# Patient Record
Sex: MALE | Race: WHITE | NOT HISPANIC OR LATINO | Employment: OTHER | URBAN - METROPOLITAN AREA
[De-identification: names, ages, dates, MRNs, and addresses within clinical notes are randomized per-mention and may not be internally consistent; named-entity substitution may affect disease eponyms.]

---

## 2024-09-09 ENCOUNTER — APPOINTMENT (OUTPATIENT)
Dept: CT IMAGING | Facility: CLINIC | Age: 84
DRG: 853 | End: 2024-09-09
Attending: EMERGENCY MEDICINE
Payer: COMMERCIAL

## 2024-09-09 ENCOUNTER — HOSPITAL ENCOUNTER (INPATIENT)
Facility: CLINIC | Age: 84
LOS: 10 days | Discharge: NURSING, REHAB OR LONG-TERM CARE | DRG: 853 | End: 2024-09-20
Attending: EMERGENCY MEDICINE | Admitting: INTERNAL MEDICINE
Payer: COMMERCIAL

## 2024-09-09 ENCOUNTER — APPOINTMENT (OUTPATIENT)
Dept: GENERAL RADIOLOGY | Facility: CLINIC | Age: 84
DRG: 853 | End: 2024-09-09
Attending: EMERGENCY MEDICINE
Payer: COMMERCIAL

## 2024-09-09 ENCOUNTER — APPOINTMENT (OUTPATIENT)
Dept: ULTRASOUND IMAGING | Facility: CLINIC | Age: 84
DRG: 853 | End: 2024-09-09
Attending: EMERGENCY MEDICINE
Payer: COMMERCIAL
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
## 2024-09-09 DIAGNOSIS — Z79.01 WARFARIN ANTICOAGULATION: ICD-10-CM

## 2024-09-09 DIAGNOSIS — R50.9 FEVER, UNSPECIFIED FEVER CAUSE: ICD-10-CM

## 2024-09-09 DIAGNOSIS — K59.00 CONSTIPATION, UNSPECIFIED CONSTIPATION TYPE: ICD-10-CM

## 2024-09-09 DIAGNOSIS — R33.9 URINARY RETENTION: Primary | ICD-10-CM

## 2024-09-09 DIAGNOSIS — L97.512 ULCER OF RIGHT FOOT WITH FAT LAYER EXPOSED (H): ICD-10-CM

## 2024-09-09 DIAGNOSIS — A41.9 SEVERE SEPSIS (H): ICD-10-CM

## 2024-09-09 DIAGNOSIS — R65.20 SEVERE SEPSIS (H): ICD-10-CM

## 2024-09-09 DIAGNOSIS — L03.115 CELLULITIS OF RIGHT FOOT: ICD-10-CM

## 2024-09-09 DIAGNOSIS — S63.259A DISLOCATION OF FINGER, INITIAL ENCOUNTER: ICD-10-CM

## 2024-09-09 DIAGNOSIS — S09.90XA CLOSED HEAD INJURY, INITIAL ENCOUNTER: ICD-10-CM

## 2024-09-09 DIAGNOSIS — R79.89 ELEVATED LACTIC ACID LEVEL: ICD-10-CM

## 2024-09-09 LAB
ACANTHOCYTES BLD QL SMEAR: SLIGHT
ALBUMIN SERPL BCG-MCNC: 3.9 G/DL (ref 3.5–5.2)
ALP SERPL-CCNC: 116 U/L (ref 40–150)
ALT SERPL W P-5'-P-CCNC: 26 U/L (ref 0–70)
ANION GAP SERPL CALCULATED.3IONS-SCNC: 8 MMOL/L (ref 7–15)
AST SERPL W P-5'-P-CCNC: 25 U/L (ref 0–45)
BASOPHILS # BLD MANUAL: 0 10E3/UL (ref 0–0.2)
BASOPHILS NFR BLD MANUAL: 0 %
BILIRUB SERPL-MCNC: 0.6 MG/DL
BUN SERPL-MCNC: 37.9 MG/DL (ref 8–23)
BURR CELLS BLD QL SMEAR: SLIGHT
CALCIUM SERPL-MCNC: 10.6 MG/DL (ref 8.8–10.4)
CHLORIDE SERPL-SCNC: 108 MMOL/L (ref 98–107)
CREAT SERPL-MCNC: 1.66 MG/DL (ref 0.67–1.17)
EGFRCR SERPLBLD CKD-EPI 2021: 41 ML/MIN/1.73M2
ELLIPTOCYTES BLD QL SMEAR: SLIGHT
EOSINOPHIL # BLD MANUAL: 0 10E3/UL (ref 0–0.7)
EOSINOPHIL NFR BLD MANUAL: 0 %
ERYTHROCYTE [DISTWIDTH] IN BLOOD BY AUTOMATED COUNT: 13.8 % (ref 10–15)
GLUCOSE SERPL-MCNC: 114 MG/DL (ref 70–99)
HCO3 SERPL-SCNC: 25 MMOL/L (ref 22–29)
HCT VFR BLD AUTO: 41.2 % (ref 40–53)
HGB BLD-MCNC: 13.2 G/DL (ref 13.3–17.7)
LACTATE SERPL-SCNC: 2.9 MMOL/L (ref 0.7–2)
LYMPHOCYTES # BLD MANUAL: 2.6 10E3/UL (ref 0.8–5.3)
LYMPHOCYTES NFR BLD MANUAL: 11 %
MCH RBC QN AUTO: 29.3 PG (ref 26.5–33)
MCHC RBC AUTO-ENTMCNC: 32 G/DL (ref 31.5–36.5)
MCV RBC AUTO: 91 FL (ref 78–100)
MONOCYTES # BLD MANUAL: 0.5 10E3/UL (ref 0–1.3)
MONOCYTES NFR BLD MANUAL: 2 %
NEUTROPHILS # BLD MANUAL: 20.9 10E3/UL (ref 1.6–8.3)
NEUTROPHILS NFR BLD MANUAL: 87 %
NRBC # BLD AUTO: 0 10E3/UL
NRBC BLD AUTO-RTO: 0 /100
PLAT MORPH BLD: ABNORMAL
PLATELET # BLD AUTO: 232 10E3/UL (ref 150–450)
POTASSIUM SERPL-SCNC: 6.1 MMOL/L (ref 3.4–5.3)
PROT SERPL-MCNC: 6.7 G/DL (ref 6.4–8.3)
RBC # BLD AUTO: 4.51 10E6/UL (ref 4.4–5.9)
RBC MORPH BLD: ABNORMAL
SODIUM SERPL-SCNC: 141 MMOL/L (ref 135–145)
WBC # BLD AUTO: 24 10E3/UL (ref 4–11)

## 2024-09-09 PROCEDURE — 250N000013 HC RX MED GY IP 250 OP 250 PS 637: Performed by: EMERGENCY MEDICINE

## 2024-09-09 PROCEDURE — 99285 EMERGENCY DEPT VISIT HI MDM: CPT | Mod: 25

## 2024-09-09 PROCEDURE — 250N000011 HC RX IP 250 OP 636: Performed by: EMERGENCY MEDICINE

## 2024-09-09 PROCEDURE — 80053 COMPREHEN METABOLIC PANEL: CPT | Performed by: EMERGENCY MEDICINE

## 2024-09-09 PROCEDURE — 36415 COLL VENOUS BLD VENIPUNCTURE: CPT | Performed by: EMERGENCY MEDICINE

## 2024-09-09 PROCEDURE — 93971 EXTREMITY STUDY: CPT | Mod: RT

## 2024-09-09 PROCEDURE — 26770 TREAT FINGER DISLOCATION: CPT | Mod: F2

## 2024-09-09 PROCEDURE — 96367 TX/PROPH/DG ADDL SEQ IV INF: CPT

## 2024-09-09 PROCEDURE — 87040 BLOOD CULTURE FOR BACTERIA: CPT | Performed by: EMERGENCY MEDICINE

## 2024-09-09 PROCEDURE — 83605 ASSAY OF LACTIC ACID: CPT | Performed by: EMERGENCY MEDICINE

## 2024-09-09 PROCEDURE — 96365 THER/PROPH/DIAG IV INF INIT: CPT

## 2024-09-09 PROCEDURE — 258N000003 HC RX IP 258 OP 636: Performed by: EMERGENCY MEDICINE

## 2024-09-09 PROCEDURE — 999N000065 XR FINGER LEFT G/E 2 VIEWS: Mod: LT

## 2024-09-09 PROCEDURE — 70450 CT HEAD/BRAIN W/O DYE: CPT

## 2024-09-09 PROCEDURE — 96366 THER/PROPH/DIAG IV INF ADDON: CPT

## 2024-09-09 PROCEDURE — 85610 PROTHROMBIN TIME: CPT | Performed by: EMERGENCY MEDICINE

## 2024-09-09 PROCEDURE — 73140 X-RAY EXAM OF FINGER(S): CPT | Mod: LT

## 2024-09-09 PROCEDURE — 85007 BL SMEAR W/DIFF WBC COUNT: CPT | Performed by: EMERGENCY MEDICINE

## 2024-09-09 PROCEDURE — 87149 DNA/RNA DIRECT PROBE: CPT | Performed by: EMERGENCY MEDICINE

## 2024-09-09 PROCEDURE — 85048 AUTOMATED LEUKOCYTE COUNT: CPT | Performed by: EMERGENCY MEDICINE

## 2024-09-09 PROCEDURE — 84132 ASSAY OF SERUM POTASSIUM: CPT | Performed by: EMERGENCY MEDICINE

## 2024-09-09 PROCEDURE — 0RSXX5Z REPOSITION LEFT FINGER PHALANGEAL JOINT WITH EXTERNAL FIXATION DEVICE, EXTERNAL APPROACH: ICD-10-PCS | Performed by: EMERGENCY MEDICINE

## 2024-09-09 PROCEDURE — 85027 COMPLETE CBC AUTOMATED: CPT | Performed by: EMERGENCY MEDICINE

## 2024-09-09 PROCEDURE — 86140 C-REACTIVE PROTEIN: CPT | Performed by: INTERNAL MEDICINE

## 2024-09-09 PROCEDURE — 83036 HEMOGLOBIN GLYCOSYLATED A1C: CPT | Performed by: INTERNAL MEDICINE

## 2024-09-09 RX ORDER — FAMOTIDINE 20 MG/1
20 TABLET, FILM COATED ORAL 2 TIMES DAILY
COMMUNITY

## 2024-09-09 RX ORDER — METOPROLOL SUCCINATE 25 MG/1
25 TABLET, EXTENDED RELEASE ORAL DAILY
Status: ON HOLD | COMMUNITY
End: 2024-09-20

## 2024-09-09 RX ORDER — PIPERACILLIN SODIUM, TAZOBACTAM SODIUM 4; .5 G/20ML; G/20ML
4.5 INJECTION, POWDER, LYOPHILIZED, FOR SOLUTION INTRAVENOUS ONCE
Status: COMPLETED | OUTPATIENT
Start: 2024-09-09 | End: 2024-09-10

## 2024-09-09 RX ORDER — ROSUVASTATIN CALCIUM 10 MG/1
10 TABLET, COATED ORAL DAILY
COMMUNITY

## 2024-09-09 RX ORDER — ISOSORBIDE MONONITRATE 20 MG/1
20 TABLET ORAL 2 TIMES DAILY
COMMUNITY
End: 2024-09-10

## 2024-09-09 RX ORDER — WARFARIN SODIUM 5 MG/1
5 TABLET ORAL DAILY
Status: ON HOLD | COMMUNITY
End: 2024-09-20

## 2024-09-09 RX ORDER — ALENDRONATE SODIUM 70 MG/1
70 TABLET ORAL
COMMUNITY

## 2024-09-09 RX ORDER — ACETAMINOPHEN 500 MG
1000 TABLET ORAL ONCE
Status: DISCONTINUED | OUTPATIENT
Start: 2024-09-09 | End: 2024-09-09

## 2024-09-09 RX ADMIN — PIPERACILLIN AND TAZOBACTAM 4.5 G: 4; .5 INJECTION, POWDER, FOR SOLUTION INTRAVENOUS at 23:51

## 2024-09-09 RX ADMIN — ACETAMINOPHEN 1000 MG: 500 TABLET, FILM COATED ORAL at 23:54

## 2024-09-09 RX ADMIN — SODIUM CHLORIDE 1000 ML: 9 INJECTION, SOLUTION INTRAVENOUS at 23:29

## 2024-09-09 ASSESSMENT — ACTIVITIES OF DAILY LIVING (ADL): ADLS_ACUITY_SCORE: 35

## 2024-09-09 NOTE — LETTER
[facility] ORTHO SPINE  [address]  [facility] [id number]  [phone]    FACSIMILE TRANSMITTAL SHEET    TO: JO ORTEGA AUTH  FAX NUMBER: [phone]      FROM: [facility]  PHONE: [phone]  DATE: 09/13/24      NOTES/COMMENTS: REFERENCE [id number]                                      IF YOU DID NOT RECEIVE THE CORRECT NUMBER OF PAGES OR THE FAX DID NOT COME THROUGH CLEARLY, PLEASE CALL THE SENDER     CONFIDENTIALITY STATEMENT: Confidential information that may accompany this transmission contains protected health information under state and federal law and is legally privileged. This information is intended only for the use of the individual or entity named above and may be used only for carrying out treatment, payment or other healthcare operations. The recipient or person responsible for delivering this information is prohibited by law from disclosing this information without proper authorization to any other party, unless required to do so by law or regulation. If you are not the intended recipient, you are hereby notified that any review, dissemination, distribution, or copying of this message is strictly prohibited. If you have received this communication in error, please destroy the materials and contact us immediately by calling the number listed above. No response indicates that the information was received by the appropriate authorized party

## 2024-09-10 ENCOUNTER — APPOINTMENT (OUTPATIENT)
Dept: MRI IMAGING | Facility: CLINIC | Age: 84
DRG: 853 | End: 2024-09-10
Attending: INTERNAL MEDICINE
Payer: COMMERCIAL

## 2024-09-10 ENCOUNTER — APPOINTMENT (OUTPATIENT)
Dept: ULTRASOUND IMAGING | Facility: CLINIC | Age: 84
DRG: 853 | End: 2024-09-10
Attending: PODIATRIST
Payer: COMMERCIAL

## 2024-09-10 PROBLEM — S09.90XA CLOSED HEAD INJURY, INITIAL ENCOUNTER: Status: ACTIVE | Noted: 2024-09-10

## 2024-09-10 PROBLEM — S63.259A DISLOCATION OF FINGER, INITIAL ENCOUNTER: Status: ACTIVE | Noted: 2024-09-10

## 2024-09-10 PROBLEM — L97.512 ULCER OF RIGHT FOOT WITH FAT LAYER EXPOSED (H): Status: ACTIVE | Noted: 2024-09-10

## 2024-09-10 PROBLEM — A41.9 SEVERE SEPSIS (H): Status: ACTIVE | Noted: 2024-09-10

## 2024-09-10 PROBLEM — R79.89 ELEVATED LACTIC ACID LEVEL: Status: ACTIVE | Noted: 2024-09-10

## 2024-09-10 PROBLEM — R50.9 FEVER, UNSPECIFIED FEVER CAUSE: Status: ACTIVE | Noted: 2024-09-10

## 2024-09-10 PROBLEM — Z79.01 WARFARIN ANTICOAGULATION: Status: ACTIVE | Noted: 2024-09-10

## 2024-09-10 PROBLEM — L03.115 CELLULITIS OF RIGHT FOOT: Status: ACTIVE | Noted: 2024-09-10
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
## 2024-09-10 PROBLEM — R65.20 SEVERE SEPSIS (H): Status: ACTIVE | Noted: 2024-09-10

## 2024-09-10 LAB
ALBUMIN UR-MCNC: 50 MG/DL
ANION GAP SERPL CALCULATED.3IONS-SCNC: 7 MMOL/L (ref 7–15)
APPEARANCE UR: CLEAR
BILIRUB UR QL STRIP: NEGATIVE
BUN SERPL-MCNC: 32 MG/DL (ref 8–23)
CALCIUM SERPL-MCNC: 9.7 MG/DL (ref 8.8–10.4)
CHLORIDE SERPL-SCNC: 109 MMOL/L (ref 98–107)
COLOR UR AUTO: YELLOW
CREAT SERPL-MCNC: 1.45 MG/DL (ref 0.67–1.17)
CRP SERPL-MCNC: 72.26 MG/L
EGFRCR SERPLBLD CKD-EPI 2021: 48 ML/MIN/1.73M2
ERYTHROCYTE [DISTWIDTH] IN BLOOD BY AUTOMATED COUNT: 13.8 % (ref 10–15)
ERYTHROCYTE [SEDIMENTATION RATE] IN BLOOD BY WESTERGREN METHOD: 14 MM/HR (ref 0–20)
GLUCOSE SERPL-MCNC: 117 MG/DL (ref 70–99)
GLUCOSE UR STRIP-MCNC: NEGATIVE MG/DL
HBA1C MFR BLD: 5.2 %
HCO3 SERPL-SCNC: 22 MMOL/L (ref 22–29)
HCT VFR BLD AUTO: 36.3 % (ref 40–53)
HGB BLD-MCNC: 11.8 G/DL (ref 13.3–17.7)
HGB UR QL STRIP: ABNORMAL
HYALINE CASTS: 2 /LPF
INR PPP: 3.7 (ref 0.85–1.15)
INR PPP: 3.98 (ref 0.85–1.15)
KETONES UR STRIP-MCNC: NEGATIVE MG/DL
LACTATE SERPL-SCNC: 1.2 MMOL/L (ref 0.7–2)
LACTATE SERPL-SCNC: 2.3 MMOL/L (ref 0.7–2)
LEUKOCYTE ESTERASE UR QL STRIP: NEGATIVE
MCH RBC QN AUTO: 29.5 PG (ref 26.5–33)
MCHC RBC AUTO-ENTMCNC: 32.5 G/DL (ref 31.5–36.5)
MCV RBC AUTO: 91 FL (ref 78–100)
MRSA DNA SPEC QL NAA+PROBE: NEGATIVE
MUCOUS THREADS #/AREA URNS LPF: PRESENT /LPF
NITRATE UR QL: NEGATIVE
PH UR STRIP: 6.5 [PH] (ref 5–7)
PLATELET # BLD AUTO: 161 10E3/UL (ref 150–450)
POTASSIUM SERPL-SCNC: 4.8 MMOL/L (ref 3.4–5.3)
POTASSIUM SERPL-SCNC: 5.2 MMOL/L (ref 3.4–5.3)
RBC # BLD AUTO: 4 10E6/UL (ref 4.4–5.9)
RBC URINE: 25 /HPF
SA TARGET DNA: POSITIVE
SODIUM SERPL-SCNC: 138 MMOL/L (ref 135–145)
SP GR UR STRIP: 1.02 (ref 1–1.03)
SQUAMOUS EPITHELIAL: <1 /HPF
UROBILINOGEN UR STRIP-MCNC: 2 MG/DL
WBC # BLD AUTO: 20.2 10E3/UL (ref 4–11)
WBC URINE: 2 /HPF

## 2024-09-10 PROCEDURE — 73718 MRI LOWER EXTREMITY W/O DYE: CPT | Mod: 26 | Performed by: RADIOLOGY

## 2024-09-10 PROCEDURE — 87641 MR-STAPH DNA AMP PROBE: CPT | Performed by: INTERNAL MEDICINE

## 2024-09-10 PROCEDURE — 250N000013 HC RX MED GY IP 250 OP 250 PS 637: Performed by: INTERNAL MEDICINE

## 2024-09-10 PROCEDURE — 120N000001 HC R&B MED SURG/OB

## 2024-09-10 PROCEDURE — 250N000011 HC RX IP 250 OP 636: Performed by: INTERNAL MEDICINE

## 2024-09-10 PROCEDURE — 81001 URINALYSIS AUTO W/SCOPE: CPT | Performed by: INTERNAL MEDICINE

## 2024-09-10 PROCEDURE — 73718 MRI LOWER EXTREMITY W/O DYE: CPT | Mod: RT

## 2024-09-10 PROCEDURE — 82374 ASSAY BLOOD CARBON DIOXIDE: CPT | Performed by: INTERNAL MEDICINE

## 2024-09-10 PROCEDURE — 258N000003 HC RX IP 258 OP 636: Performed by: EMERGENCY MEDICINE

## 2024-09-10 PROCEDURE — 36415 COLL VENOUS BLD VENIPUNCTURE: CPT | Performed by: INTERNAL MEDICINE

## 2024-09-10 PROCEDURE — 99207 PR APP CREDIT; MD BILLING SHARED VISIT: CPT | Performed by: INTERNAL MEDICINE

## 2024-09-10 PROCEDURE — 85652 RBC SED RATE AUTOMATED: CPT | Performed by: INTERNAL MEDICINE

## 2024-09-10 PROCEDURE — 99223 1ST HOSP IP/OBS HIGH 75: CPT | Performed by: INTERNAL MEDICINE

## 2024-09-10 PROCEDURE — 93922 UPR/L XTREMITY ART 2 LEVELS: CPT

## 2024-09-10 PROCEDURE — 87640 STAPH A DNA AMP PROBE: CPT | Performed by: INTERNAL MEDICINE

## 2024-09-10 PROCEDURE — 258N000003 HC RX IP 258 OP 636: Performed by: INTERNAL MEDICINE

## 2024-09-10 PROCEDURE — 99222 1ST HOSP IP/OBS MODERATE 55: CPT | Performed by: PODIATRIST

## 2024-09-10 PROCEDURE — 85027 COMPLETE CBC AUTOMATED: CPT | Performed by: INTERNAL MEDICINE

## 2024-09-10 PROCEDURE — 85610 PROTHROMBIN TIME: CPT | Performed by: INTERNAL MEDICINE

## 2024-09-10 PROCEDURE — 83605 ASSAY OF LACTIC ACID: CPT | Performed by: INTERNAL MEDICINE

## 2024-09-10 RX ORDER — ISOSORBIDE MONONITRATE 60 MG/1
60 TABLET, EXTENDED RELEASE ORAL DAILY
COMMUNITY

## 2024-09-10 RX ORDER — PIPERACILLIN SODIUM, TAZOBACTAM SODIUM 3; .375 G/15ML; G/15ML
3.38 INJECTION, POWDER, LYOPHILIZED, FOR SOLUTION INTRAVENOUS EVERY 6 HOURS
Status: DISCONTINUED | OUTPATIENT
Start: 2024-09-10 | End: 2024-09-17

## 2024-09-10 RX ORDER — AMLODIPINE BESYLATE 2.5 MG/1
2.5 TABLET ORAL DAILY
Status: DISCONTINUED | OUTPATIENT
Start: 2024-09-10 | End: 2024-09-12

## 2024-09-10 RX ORDER — AMOXICILLIN 250 MG
2 CAPSULE ORAL 2 TIMES DAILY PRN
Status: DISCONTINUED | OUTPATIENT
Start: 2024-09-10 | End: 2024-09-12

## 2024-09-10 RX ORDER — CARBOXYMETHYLCELLULOSE SODIUM 5 MG/ML
1-2 SOLUTION/ DROPS OPHTHALMIC 4 TIMES DAILY PRN
Status: DISCONTINUED | OUTPATIENT
Start: 2024-09-10 | End: 2024-09-20 | Stop reason: HOSPADM

## 2024-09-10 RX ORDER — LIDOCAINE 40 MG/G
CREAM TOPICAL
Status: DISCONTINUED | OUTPATIENT
Start: 2024-09-10 | End: 2024-09-20

## 2024-09-10 RX ORDER — ROSUVASTATIN CALCIUM 10 MG/1
10 TABLET, COATED ORAL DAILY
Status: DISCONTINUED | OUTPATIENT
Start: 2024-09-10 | End: 2024-09-20 | Stop reason: HOSPADM

## 2024-09-10 RX ORDER — METOPROLOL SUCCINATE 25 MG/1
25 TABLET, EXTENDED RELEASE ORAL DAILY
Status: DISCONTINUED | OUTPATIENT
Start: 2024-09-10 | End: 2024-09-14

## 2024-09-10 RX ORDER — AMOXICILLIN 250 MG
1 CAPSULE ORAL 2 TIMES DAILY PRN
Status: DISCONTINUED | OUTPATIENT
Start: 2024-09-10 | End: 2024-09-12

## 2024-09-10 RX ORDER — CHOLECALCIFEROL (VITAMIN D3) 50 MCG
1 TABLET ORAL DAILY
COMMUNITY

## 2024-09-10 RX ORDER — LORAZEPAM 0.5 MG/1
0.5 TABLET ORAL
Status: DISCONTINUED | OUTPATIENT
Start: 2024-09-10 | End: 2024-09-11

## 2024-09-10 RX ORDER — ONDANSETRON 2 MG/ML
4 INJECTION INTRAMUSCULAR; INTRAVENOUS EVERY 6 HOURS PRN
Status: DISCONTINUED | OUTPATIENT
Start: 2024-09-10 | End: 2024-09-20 | Stop reason: HOSPADM

## 2024-09-10 RX ORDER — ACETAMINOPHEN 650 MG/1
650 SUPPOSITORY RECTAL EVERY 4 HOURS PRN
Status: DISCONTINUED | OUTPATIENT
Start: 2024-09-10 | End: 2024-09-12

## 2024-09-10 RX ORDER — ONDANSETRON 4 MG/1
4 TABLET, ORALLY DISINTEGRATING ORAL EVERY 6 HOURS PRN
Status: DISCONTINUED | OUTPATIENT
Start: 2024-09-10 | End: 2024-09-20 | Stop reason: HOSPADM

## 2024-09-10 RX ORDER — FAMOTIDINE 20 MG/1
20 TABLET, FILM COATED ORAL 2 TIMES DAILY
Status: DISCONTINUED | OUTPATIENT
Start: 2024-09-10 | End: 2024-09-12

## 2024-09-10 RX ORDER — ISOSORBIDE MONONITRATE 30 MG/1
60 TABLET, EXTENDED RELEASE ORAL DAILY
Status: DISCONTINUED | OUTPATIENT
Start: 2024-09-10 | End: 2024-09-12

## 2024-09-10 RX ORDER — AMLODIPINE BESYLATE 10 MG/1
10 TABLET ORAL DAILY
COMMUNITY

## 2024-09-10 RX ORDER — POLYETHYLENE GLYCOL 3350 17 G/17G
17 POWDER, FOR SOLUTION ORAL 2 TIMES DAILY PRN
Status: DISCONTINUED | OUTPATIENT
Start: 2024-09-10 | End: 2024-09-12

## 2024-09-10 RX ORDER — VANCOMYCIN HYDROCHLORIDE
1250 EVERY 24 HOURS
Status: DISCONTINUED | OUTPATIENT
Start: 2024-09-10 | End: 2024-09-11

## 2024-09-10 RX ORDER — ACETAMINOPHEN 325 MG/1
650 TABLET ORAL EVERY 4 HOURS PRN
Status: DISCONTINUED | OUTPATIENT
Start: 2024-09-10 | End: 2024-09-12

## 2024-09-10 RX ORDER — VIT C/E/ZN/COPPR/LUTEIN/ZEAXAN 250MG-90MG
1 CAPSULE ORAL 2 TIMES DAILY
COMMUNITY

## 2024-09-10 RX ORDER — DIPHENHYDRAMINE HCL 25 MG
1-2 CAPSULE ORAL 4 TIMES DAILY PRN
COMMUNITY

## 2024-09-10 RX ORDER — CALCIUM CARBONATE 500 MG/1
1000 TABLET, CHEWABLE ORAL 4 TIMES DAILY PRN
Status: DISCONTINUED | OUTPATIENT
Start: 2024-09-10 | End: 2024-09-20 | Stop reason: HOSPADM

## 2024-09-10 RX ORDER — VANCOMYCIN 2 GRAM/500 ML IN 0.9 % SODIUM CHLORIDE INTRAVENOUS
2000 ONCE
Status: COMPLETED | OUTPATIENT
Start: 2024-09-10 | End: 2024-09-10

## 2024-09-10 RX ADMIN — ROSUVASTATIN 10 MG: 10 TABLET, FILM COATED ORAL at 13:27

## 2024-09-10 RX ADMIN — AMLODIPINE BESYLATE 2.5 MG: 2.5 TABLET ORAL at 18:10

## 2024-09-10 RX ADMIN — METOPROLOL SUCCINATE 25 MG: 25 TABLET, EXTENDED RELEASE ORAL at 13:27

## 2024-09-10 RX ADMIN — VANCOMYCIN HYDROCHLORIDE 1250 MG: 5 INJECTION, POWDER, LYOPHILIZED, FOR SOLUTION INTRAVENOUS at 22:54

## 2024-09-10 RX ADMIN — VANCOMYCIN HYDROCHLORIDE 2000 MG: 10 INJECTION, POWDER, LYOPHILIZED, FOR SOLUTION INTRAVENOUS at 02:41

## 2024-09-10 RX ADMIN — FAMOTIDINE 20 MG: 20 TABLET ORAL at 20:41

## 2024-09-10 RX ADMIN — PIPERACILLIN AND TAZOBACTAM 3.38 G: 3; .375 INJECTION, POWDER, FOR SOLUTION INTRAVENOUS at 20:41

## 2024-09-10 RX ADMIN — SODIUM CHLORIDE, POTASSIUM CHLORIDE, SODIUM LACTATE AND CALCIUM CHLORIDE 2000 ML: 600; 310; 30; 20 INJECTION, SOLUTION INTRAVENOUS at 00:56

## 2024-09-10 RX ADMIN — ISOSORBIDE MONONITRATE 60 MG: 60 TABLET, EXTENDED RELEASE ORAL at 15:13

## 2024-09-10 RX ADMIN — PIPERACILLIN AND TAZOBACTAM 3.38 G: 3; .375 INJECTION, POWDER, FOR SOLUTION INTRAVENOUS at 09:52

## 2024-09-10 RX ADMIN — PIPERACILLIN AND TAZOBACTAM 3.38 G: 3; .375 INJECTION, POWDER, FOR SOLUTION INTRAVENOUS at 15:14

## 2024-09-10 ASSESSMENT — ACTIVITIES OF DAILY LIVING (ADL)
ADLS_ACUITY_SCORE: 38
ADLS_ACUITY_SCORE: 35
ADLS_ACUITY_SCORE: 37
ADLS_ACUITY_SCORE: 35
ADLS_ACUITY_SCORE: 37
ADLS_ACUITY_SCORE: 30
ADLS_ACUITY_SCORE: 35
ADLS_ACUITY_SCORE: 35
ADLS_ACUITY_SCORE: 38
ADLS_ACUITY_SCORE: 38
ADLS_ACUITY_SCORE: 35
ADLS_ACUITY_SCORE: 41
ADLS_ACUITY_SCORE: 38
ADLS_ACUITY_SCORE: 35
ADLS_ACUITY_SCORE: 35
ADLS_ACUITY_SCORE: 38
ADLS_ACUITY_SCORE: 35
ADLS_ACUITY_SCORE: 38
ADLS_ACUITY_SCORE: 35
ADLS_ACUITY_SCORE: 38
ADLS_ACUITY_SCORE: 35
ADLS_ACUITY_SCORE: 38
ADLS_ACUITY_SCORE: 35

## 2024-09-10 NOTE — PHARMACY-ADMISSION MEDICATION HISTORY
Pharmacist Admission Medication History    Admission medication history is complete. The information provided in this note is only as accurate as the sources available at the time of the update.    Information Source(s): Patient, Family member, Clinic records, and CareEverywhere/SureScripts via in-person    Pertinent Information:  On 9/9, INR 4.3 Warfarin held & 5mg daily dose to start 9/10.    Changes made to PTA medication list:  Added: Amlodipine, Imdur, Vit.D, eye vits, artificial tears  Deleted: Isosorbide Mono 20mg bid  Changed: Veltassa 8.4g daily-->MWF    Allergies reviewed with patient and updates made in EHR: yes    Medication History Completed By: Rekha Monge PharmD 9/10/2024 11:49 AM    PTA Med List   Medication Sig Last Dose    alendronate (FOSAMAX) 70 MG tablet Take 70 mg by mouth every 7 days. On Saturdays 9/7/2024    amLODIPine (NORVASC) 10 MG tablet Take 10 mg by mouth daily. 9/9/2024    famotidine (PEPCID) 20 MG tablet Take 20 mg by mouth 2 times daily. 9/9/2024 at am    hypromellose-dextran (HYPROMELLOSE-DEXTRAN 0.3-0.1%) 0.1-0.3 % ophthalmic solution Place 1-2 drops into both eyes 4 times daily as needed for dry eyes. prn    isosorbide mononitrate (IMDUR) 60 MG 24 hr tablet Take 60 mg by mouth daily. 9/9/2024    metoprolol succinate ER (TOPROL XL) 25 MG 24 hr tablet Take 25 mg by mouth daily. 9/9/2024    Multiple Vitamins-Minerals (PRESERVISION AREDS 2) CHEW Take 1 tablet by mouth 2 times daily. 9/9/2024    patiromer (VELTASSA) 8.4 g packet Take 8.4 g by mouth. on Mon Wed Fri at 1500 9/9/2024    rosuvastatin (CRESTOR) 10 MG tablet Take 10 mg by mouth daily. 9/9/2024    vitamin D3 (CHOLECALCIFEROL) 50 mcg (2000 units) tablet Take 1 tablet by mouth daily. 9/9/2024    warfarin ANTICOAGULANT (COUMADIN) 5 MG tablet Take 5 mg by mouth daily. 7.5mg on 9/8/2024 at pm

## 2024-09-10 NOTE — ED NOTES
[facility]    ED Boarding Nurse Handoff Addendum Report:    Date/time: 9/10/2024, 11:38 AM    Activity Level: assist of 1    Fall Risk: Yes:  nonskid shoes/slippers when out of bed, arm band in place, patient and family education, and assistive device/personal items within reach    Active Infusions: PIV SL    Current Meds Due: see MAR    Current care needs: Podiatry consult, orthopedic surgery consult.     Oxygen requirements (liters/min and/or FiO2): none    Respiratory status: Room air    Vital signs (within last 30 minutes):    Vitals:    09/10/24 0530 09/10/24 0600 09/10/24 0630 09/10/24 0745   BP: 129/67 113/69 123/66 123/69   BP Location:    Left arm   Pulse: 58 62 55 50   Resp:    18   Temp:    97.4  F (36.3  C)   TempSrc:    Oral   SpO2:    91%   Weight:       Height:           Focused assessment within last 30 minutes:    Patient A&O x3, forgetful. Foam/metal splint intact to left third finger, splint was removed briefly in order for patient to go to MRI. Bruising noted to finger, mild swelling, denies pain to finger. Right lower leg, warm, red/salazar, wound to bottom of foot, MRI completed. Patient tolerating IV antibiotics. NPO. Patient had been due to void, unable to void. Bladder scanned for 655cc, straight cath completed with 600cc out. Denies difficulty with urination but does states he sees urologist regularily with cystoscopies frequently due to hx of cancer.     ED Boarding Nurse name: Fe Kumari RN

## 2024-09-10 NOTE — ED TRIAGE NOTES
Arrives from home. States he was taking the trash out and fell onto his face. States injured his left hand middle finger appears dislocated.     Also reports hitting his right ankle. Noted that right ankle is VERY swollen, hot to the touch and appears red.     Denies hitting his head.

## 2024-09-10 NOTE — H&P
[facility]  Hospitalist Admission Note  Name: Gregory Zhong    MRN: [id number]  YOB: 1940    Age: 83 year old  Date of admission: 9/9/2024  Primary care provider: Fannie Vail [place]    Chief Complaint:  fall    Assessment and Plan:   Fall  Left 3rd finger dislocation: Patient was bringing out the yard waste been to the curb and it was quite heavy.  He says he slipped on the driveway.  Denies any head trauma, but he has an abrasion to his nose and chin.  He does not think he lost consciousness.  He had a deformity of the left third middle finger and x-ray showed dislocation which was reduced in the ER and splint applied.  He is on warfarin with supratherapeutic INR, but noncontrast head CT was negative for acute trauma/bleeding.  -Consult orthopedic surgery for the left third finger dislocation  -Acetaminophen as needed for mild pain  -Fall precautions  -Consult PT/SW    Sepsis  Right lower extremity cellulitis  Right plantar foot ulcer, possible osteomyelitis  Neuropathy: In the ER he was slightly tachycardic with a temperature of 100.6  F and had a leukocytosis of 24.  His lactic acid was elevated at 2.9 consistent with sepsis.  Initial source unclear until clothing removed and he was found to have a deep right plantar foot ulcer along with 2-3+ right lower extremity edema with erythema consistent with cellulitis and concern for possible osteomyelitis.  He and his son have no idea how long the ulcer has been there although his son noted for at least the past month he said some difficulty ambulating with that right leg.  He does have a history of idiopathic neuropathy and really minimal light touch sensation in the right foot on exam.  He is not a known diabetic.  His CRP is elevated at 72.  Right lower extremity ultrasound negative for DVT.  He received IV vancomycin/IV Zosyn, 2 L LR, and 1 L NS in the ER.  Lactic acid improved to 2.3.  -Empiric IV vancomycin pharmacy to  dose and IV Zosyn 3.375 g every 6 hours  -Consult podiatry  -Ordered a right foot MRI.  Also ordered 0.5 mg oral lorazepam for prior to the MRI given his baseline tremor and cognitive impairment to try to get better study  -Repeat CBC and lactic acid in the morning    Mild cognitive impairment  Tremor: He has a chronic tremor for many years.  He has been developing some cognitive impairment per his son and they have an appointment with a neurologist to try to get a formal diagnosis.  He does live alone in his own home, but his son lives about a mile away.  -Fall precautions    Chronic atrial fibrillation, atrial flutter  HTN  HLD: Previous ablation for atrial flutter.  He does have chronic A-fib and he is on warfarin.  Has had issues with supratherapeutic INR of late and INR is 3.9 in the ER.  He is also on metoprolol succinate 25 mg daily.  Awaiting formal Pharm.D. med rec, but he may also be on amlodipine 10 mg daily, Imdur 60 mg daily, and rosuvastatin 10 mg daily  -Hold warfarin for now while awaiting foot MRI results to make sure he does not need any procedures  -Daily INR  -Resume metoprolol  -Awaiting Pharm.D. med rec, then resume amlodipine, rosuvastatin, and Imdur if taking    GERD: Resume famotidine 20 mg twice daily.    CKD stage IIIb  Hyperkalemia: Creatinine baseline appears to be about 1.5-1.7.  Creatinine 1.6 in the ER.  Initial potassium high at 6.1 with repeat normalized to 5.2.  -BMP in the morning    History bilateral renal mass  History of prostate cancer: Previous bilateral renal mass ablations.    DVT Prophylaxis: Pneumatic Compression Devices  Code Status: Full Code  FEN: Regular diet  Discharge Dispo: Lives alone in a house.  Son lives about a mile away.  Consult PT/SW  Estimated Disch Date / # of Days until Disch: Admit inpatient for sepsis and further workup of his right foot ulcer.  Anticipate 2 or 3 night hospitalization or potentially longer if he needs surgery/amputation.      History  of Present Illness:  Gregory Zhong is a 83 year old male with PMH including CKD stage IIIb, A-fib/flutter s/p ablation chronically on warfarin, HTN, HLD, neuropathy, prostate cancer, bilateral renal masses, GERD, and new or cognitive impairment who presents with his son for evaluation after a fall.  The patient lives alone in a house, but his son lives about a mile away.  There have been concerns for cognitive impairment and he is due to see a neurologist in clinic for further workup.  The patient was moving his yard waste up into the curb when he says he slipped and fell.  He does not think he lost consciousness.  He denies hitting his head, but he has facial abrasions.  He denies any headache.  He did have pain in his left third finger.  Denies any recent falls, but his son says that he has appeared to have some difficulty walking with the right leg.  The patient denies any fevers, chills, nausea, vomiting, cough.      History obtained from patient, patient's son, medical record, and from Dr. Brewer in the emergency department.  Blood pressure 146/80, heart rate 94, temperature 100.6  F, oxygen 98% room air.  Initial labs showed hyperkalemia with potassium of 6.1, BUN 37 with creatinine 1.6.  LFTs within normal limits.  Lactic acid elevated 2.9.  Leukocytosis of 24 with hemoglobin 13.2 and platelet count of 232.  INR supratherapeutic at 3.98.  X-ray of the left hand showed a third finger dislocation.  Right lower extremity ultrasound was negative for DVT.  CT head without contrast did not show any acute trauma or hemorrhage.  He was started on IV vancomycin and IV Zosyn for sepsis.  He received 2 L LR and 1 L NS with improvement and lactic acid of 2.3.  He was found to have significant cellulitis of the right leg along with a deep plantar right foot ulcer.  The patient and son have no idea how long that ulcer has been there.  Admit inpatient.       Clinically Significant Risk Factors Present on Admission        #  "Hyperkalemia: Highest K = 6.1 mmol/L in last 2 days, will monitor as appropriate    # Hypercalcemia: Highest Ca = 10.6 mg/dL in last 2 days, will monitor as appropriate       # Drug Induced Coagulation Defect: home medication list includes an anticoagulant medication            # Overweight: Estimated body mass index is 27.34 kg/m  as calculated from the following:    Height as of this encounter: 1.854 m (6' 1\").    Weight as of this encounter: 94 kg (207 lb 3.7 oz).                              Past Medical History reviewed:  Past Medical History:   Diagnosis Date    Atrial flutter (H)     Bladder cancer (H)     Chronic atrial fibrillation (H)     Chronic kidney disease, stage III (moderate) (H)     Dyslipidemia     Gastroesophageal reflux disease     History of basal cell carcinoma     HLD (hyperlipidemia)     Hyperparathyroidism (H24)     Hypertension     Mild cognitive impairment     Prostate cancer (H)     Tremor      Past Surgical History reviewed:  Past Surgical History:   Procedure Laterality Date    APPENDECTOMY      Cryoablation of left and right renal masses      CYSTOSCOPY      Excision of basal cell carcinoma      MOHS MICROGRAPHIC PROCEDURE      TONSILLECTOMY       Social History reviewed:  Social History     Tobacco Use    Smoking status: Never    Smokeless tobacco: Not on file   Substance Use Topics    Alcohol use: Not on file     Social History     Social History Narrative    Not on file     Family History reviewed:  No family history on file.  Allergies:  No Known Allergies  Medications, awaiting formal med rec:  Prior to Admission medications    Medication Sig Last Dose Taking? Auth Provider Long Term End Date   alendronate (FOSAMAX) 70 MG tablet Take 70 mg by mouth every 7 days.  Yes Reported, Patient Yes    famotidine (PEPCID) 20 MG tablet Take 20 mg by mouth 2 times daily.  Yes Reported, Patient     isosorbide mononitrate (ISMO/MONOKET) 20 MG tablet Take 20 mg by mouth 2 times daily.  Yes " "Reported, Patient Yes    metoprolol succinate ER (TOPROL XL) 25 MG 24 hr tablet Take 25 mg by mouth daily.  Yes Reported, Patient Yes    patiromer (VELTASSA) 8.4 g packet Take 8.4 g by mouth daily.  Yes Reported, Patient     rosuvastatin (CRESTOR) 10 MG tablet Take 10 mg by mouth daily.  Yes Reported, Patient Yes    warfarin ANTICOAGULANT (COUMADIN) 5 MG tablet Take 5 mg by mouth daily.  Yes Reported, Patient       Review of Systems:  A Comprehensive greater than 10 system review of systems was carried out.  Pertinent positives and negatives are noted above.  Otherwise negative.     Physical Exam:  Blood pressure 127/68, pulse 63, temperature 99.3  F (37.4  C), temperature source Oral, resp. rate 24, height 1.854 m (6' 1\"), weight 94 kg (207 lb 3.7 oz), SpO2 96%.  Wt Readings from Last 1 Encounters:   09/10/24 94 kg (207 lb 3.7 oz)     Exam:  Constitutional: Awake, NAD   Eyes: sclera white   HEENT: Abrasion to the bridge of the nose and chin  Respiratory: no respiratory distress, lungs cta bilaterally, no crackles or wheeze  Cardiovascular: Irregularly, regular rhythm, regular rate, no appreciable murmur  GI: non-tender, not distended, bowel sounds present  Skin/musculoskeletal/extremities: 2-3+ right lower extremity edema, 1-2+ left lower extremity admit.  Erythema of the right lower anterior leg and foot.  Deep circular right plantar foot ulcer with scant drainage.  Neurologic: Alert, answering some symptom-based questions although son is correcting him on recent events.  Follows commands.  Minimal light touch sensation to the right foot.  Some left foot light touch sensation.  Psychiatric: calm, cooperative, normal affect    Lab and imaging data personally reviewed:  Labs:  Recent Labs   Lab 09/09/24 2157   WBC 24.0*   HGB 13.2*   HCT 41.2   MCV 91        Recent Labs   Lab 09/09/24 2329 09/09/24 2157   NA  --  141   POTASSIUM 5.2 6.1*   CHLORIDE  --  108*   CO2  --  25   ANIONGAP  --  8   GLC  --  114* "   BUN  --  37.9*   CR  --  1.66*   GFRESTIMATED  --  41*   DAVID  --  10.6*   PROTTOTAL  --  6.7   ALBUMIN  --  3.9   BILITOTAL  --  0.6   ALKPHOS  --  116   AST  --  25   ALT  --  26     Recent Labs   Lab 09/09/24  2332 09/09/24  2157   LACT 2.3* 2.9*     CRP 72  INR 3.98    Imaging:  Recent Results (from the past 24 hour(s))   Head CT w/o contrast    Narrative    EXAM: CT HEAD W/O CONTRAST  LOCATION: [facility]  DATE: 9/9/2024    INDICATION: fall, nose bloody, blood thinners  COMPARISON: 7/31/2024.  TECHNIQUE: Routine CT Head without IV contrast. Multiplanar reformats. Dose reduction techniques were used.    FINDINGS:  INTRACRANIAL CONTENTS: No intracranial hemorrhage, extraaxial collection, or mass effect.  No CT evidence of acute infarct. There is scattered low-attenuation within the periventricular and subcortical white matter consistent with diffuse small vessel   ischemic disease. The ventricular system, basal cisterns and cortical sulci are consistent with volume loss.     VISUALIZED ORBITS/SINUSES/MASTOIDS: No intraorbital abnormality. No paranasal sinus mucosal disease. No middle ear or mastoid effusion.    BONES/SOFT TISSUES: No acute abnormality.      Impression    IMPRESSION:  1.  No CT finding of a mass, hemorrhage or focal area suggestive of acute infarct.  2.  Stable diffuse age related changes.   Fingers XR, 2-3 views, left    Narrative    EXAM: XR FINGER LEFT G/E 2 VIEWS  LOCATION: [facility]  DATE: 9/9/2024    INDICATION: Suspected dislocation after fall.  COMPARISON: None available.      Impression    IMPRESSION: Severe ulnar and posterior subluxation of the left long finger middle phalanx at the proximal interphalangeal joint.  A definitive fracture is not identified. Attention on postreduction imaging.    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 Mild polyarticular osteoarthritis throughout the remainder of  the left hand and wrist. Healed, instrumented fracture of the second metacarpal shaft.     Fingers XR, 2-3 views, left    Narrative    EXAM: XR FINGER LEFT G/E 2 VIEWS  LOCATION: [facility]  DATE: 9/10/2024    INDICATION: Post reduction  COMPARISON: 09/09/2024      Impression    IMPRESSION: The dislocated middle phalanx of the left third finger has been reduced and now properly articulates at the PIP joint. No fracture. 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 Erosive osteoarthritis at the DIP joint of   the fifth finger. Internal fixation screws left second metacarpal.     US Lower Extremity Venous Duplex Right    Narrative    EXAM: US LOWER EXTREMITY VENOUS DUPLEX RIGHT  LOCATION: [facility]  DATE: 9/10/2024    INDICATION: Leg swelling and infection  COMPARISON: None.  TECHNIQUE: Venous Duplex ultrasound of the right lower extremity with and without compression, augmentation and duplex. Color flow and spectral Doppler with waveform analysis performed.    FINDINGS: Exam includes the common femoral, femoral, popliteal, and contralateral common femoral veins as well as segmentally visualized deep calf veins and greater saphenous vein.     RIGHT: No deep vein thrombosis. No superficial thrombophlebitis. No popliteal cyst. Soft tissue edema noted in the right calf.      Impression    IMPRESSION:  1.  No deep venous thrombosis in the right lower extremity.       Tyrone Hernández MD  Hospitalist  [facility]

## 2024-09-10 NOTE — ED PROVIDER NOTES
Emergency Department Note      History of Present Illness     Chief Complaint   Hand Pain and Joint Swelling    HPI   Gregory Zhong is a 83 year old male with history of hypertension, CKD stage 3, and atrial fibrillation on Coumadin who presents with a male  after a fall. Patient reports that he was taking out the trash tonight when he slipped on wet grass and fell in the driveway. He might have hit his head and he endorses injury to his left middle finger. It appears to be dislocated. Male  says that there in concern for a cellulitis infection in his right leg and patient presents with a deep ulcer on the bottom of his right foot that he says has been there for years but has been improving. See images below. No recent nausea, vomiting, fever, or cough. Male  denies history of diabetes mellitus.     Independent Historian   Male  as detailed above.    Review of External Notes   Care everywhere reviewed and epic updated    Past Medical History     Medical History and Problem List   Past Medical History:   Diagnosis Date    Atrial flutter (H)     Bladder cancer (H)     Chronic atrial fibrillation (H)     Chronic kidney disease, stage III (moderate) (H)     Dyslipidemia     Gastroesophageal reflux disease     History of basal cell carcinoma     Hyperparathyroidism (H24)     Hypertension     Prostate cancer (H)     Tremor      Medications   alendronate (FOSAMAX) 70 MG tablet  famotidine (PEPCID) 20 MG tablet  isosorbide mononitrate (ISMO/MONOKET) 20 MG tablet  metoprolol succinate ER (TOPROL XL) 25 MG 24 hr tablet  patiromer (VELTASSA) 8.4 g packet  rosuvastatin (CRESTOR) 10 MG tablet  warfarin ANTICOAGULANT (COUMADIN) 5 MG tablet    Surgical History   Past Surgical History:   Procedure Laterality Date    APPENDECTOMY      Cryoablation of left and right renal masses      CYSTOSCOPY      Excision of basal cell carcinoma      MOHS MICROGRAPHIC PROCEDURE      TONSILLECTOMY          Physical Exam     Patient Vitals for the past 24 hrs:   BP Temp Temp src Pulse Resp SpO2 Weight   09/10/24 0036 -- -- -- -- -- -- 94 kg (207 lb 3.7 oz)   09/09/24 2353 -- 99.3  F (37.4  C) Oral -- -- -- --   09/09/24 2330 155/73 -- -- 76 -- 96 % --   09/09/24 2315 156/70 -- -- -- -- 96 % --   09/09/24 2141 146/80 -- -- -- -- -- --   09/09/24 2137 -- 100.6  F (38.1  C) Temporal 94 18 98 % --     Physical Exam  Nursing note and vitals reviewed.  Constitutional: Cooperative.   HENT:   Mouth/Throat: Mucous membranes are slightly dry.   Cardiovascular: Normal rate, regular rhythm and normal heart sounds.  No murmur.  Pulmonary/Chest: Effort normal and breath sounds normal. No respiratory distress. No wheezes. No rales.   Abdominal: Soft. Normal appearance and bowel sounds are normal. No distension. There is no tenderness. There is no rigidity and no guarding.   Musculoskeletal: Normal range of motion of extremities with the exception of the right third finger which is dislocated at the PIP joint.   Neurological: Alert.  Oriented x 3.  Strength normal.  GCS 15  Skin: Skin is warm and dry. Abrasions to the nose and chin. Deep ulcer on the ball of the right foot with surrounding erythema & soft tissue swelling tracking [facility] up the anterior skin.  See pictures below  Psychiatric: Normal mood and affect.              Diagnostics     Lab Results   Repeat after 1 L of IV fluid Labs Ordered and Resulted from Time of ED Arrival to Time of ED Departure   COMPREHENSIVE METABOLIC PANEL - Abnormal       Result Value    Sodium 141      Potassium 6.1 (*)     Carbon Dioxide (CO2) 25      Anion Gap 8      Urea Nitrogen 37.9 (*)     Creatinine 1.66 (*)     GFR Estimate 41 (*)     Calcium 10.6 (*)     Chloride 108 (*)     Glucose 114 (*)     Alkaline Phosphatase 116      AST 25      ALT 26      Protein Total 6.7      Albumin 3.9      Bilirubin Total 0.6     LACTIC ACID WHOLE BLOOD - Abnormal    Lactic Acid 2.9 (*)    CBC WITH  PLATELETS AND DIFFERENTIAL - Abnormal    WBC Count 24.0 (*)     RBC Count 4.51      Hemoglobin 13.2 (*)     Hematocrit 41.2      MCV 91      MCH 29.3      MCHC 32.0      RDW 13.8      Platelet Count 232      NRBCs per 100 WBC 0      Absolute NRBCs 0.0     MANUAL DIFFERENTIAL - Abnormal    % Neutrophils 87      % Lymphocytes 11      % Monocytes 2      % Eosinophils 0      % Basophils 0      Absolute Neutrophils 20.9 (*)     Absolute Lymphocytes 2.6      Absolute Monocytes 0.5      Absolute Eosinophils 0.0      Absolute Basophils 0.0      RBC Morphology Confirmed RBC Indices      Platelet Assessment        Value: Automated Count Confirmed. Platelet morphology is normal.    Acanthocytes Slight (*)     [place] Cells Slight (*)     Elliptocytes Slight (*)    INR - Abnormal    INR 3.98 (*)    LACTIC ACID WHOLE BLOOD - Abnormal (Repeat after IVF x 1L)    Lactic Acid 2.3 (*)    POTASSIUM - Normal    Potassium 5.2     ROUTINE UA WITH MICROSCOPIC: pending   BLOOD CULTURE: pending     Imaging   US Lower Extremity Venous Duplex Right   Final Result   IMPRESSION:   1.  No deep venous thrombosis in the right lower extremity.      Fingers XR, 2-3 views, left   Final Result   IMPRESSION: The dislocated middle phalanx of the left third finger has been reduced and now properly articulates at the PIP joint. No fracture. 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 Erosive osteoarthritis at the DIP joint of    the fifth finger. Internal fixation screws left second metacarpal.         Fingers XR, 2-3 views, left   Final Result   IMPRESSION: Severe ulnar and posterior subluxation of the left long finger middle phalanx at the proximal interphalangeal joint.  A definitive fracture is not identified. Attention on postreduction imaging.      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 Mild polyarticular osteoarthritis throughout the remainder of the left hand and wrist. Healed,  instrumented fracture of the second metacarpal shaft.         Head CT w/o contrast   Final Result   IMPRESSION:   1.  No CT finding of a mass, hemorrhage or focal area suggestive of acute infarct.   2.  Stable diffuse age related changes.        Independent Interpretation   X-ray left fingers shows dislocation of the 3rd finger PIP   Repeat X-ray left fingers shows successful reduction     ED Course      Medications Administered   Medications   lactated ringers BOLUS 2,000 mL (has no administration in time range)   sodium chloride 0.9% BOLUS 1,000 mL (1,000 mLs Intravenous $New Bag 9/9/24 4519)   acetaminophen (TYLENOL) tablet 1,000 mg (1,000 mg Oral $Given 9/9/24 8257)   piperacillin-tazobactam (ZOSYN) 4.5 g vial to attach to  mL bag (4.5 g Intravenous $New Bag 9/9/24 9515)     Vancomycin ordered to be dosed by pharmacy    Procedures       Dislocation Reduction   Procedure: Dislocation Reduction of left third PIP joint of finger  Consent: Verbal from Patient  Risks Discussed: Pain, need for repeat attempts, fracture, neurovascular injury, unsuccessful attempts, and need to go to OR  Universal Protocol: Universal protocol was followed and time out conducted just prior to starting procedure, confirming patient identity, site/side, procedure, patient position, and availability of correct equipment    Indication: Dislocated L third (middle) finger   Location: Left L third (middle) finger  Anesthesia/Sedation: None   Procedure Detail: I manipulated the joint including Traction-counter traction    Immobilized with splint   Post procedure assessment:  Gross deformity resolved   Patient Status: The patient tolerated the procedure well: Yes. There were no complications.      Discussion of Management   Admitting hospitalist    ED Course   ED Course as of 09/09/24 2321   Mon Sep 09, 2024   2302 I evaluated the patient, obtained history, and performed a physical exam as detailed above.    2306 I reduced the patient's  "dislocation of the left third finger, see procedure note above.     Additional Documentation  None    Medical Decision Making / Diagnosis     CMS Diagnoses: The patient has signs of Severe Sepsis        If one the following conditions is present, a 30 mL/kg bolus is recommended as part of the 6 hour bundle (IBW can be used for BMI >30, or document refusal/contraindication):      1.   Initial hypotension  defined as 2 bps < 90 or map < 65 in the 6hrs before or 3hrs after time zero.     2.  Lactate >4.      The patient has signs of Severe Sepsis as evidenced by:    1. 2 SIRS criteria, AND  2. Suspected infection, AND   3. Organ dysfunction: Lactic Acidosis with value >2.0    Time severe sepsis diagnosis confirmed: 2157  09/10/24 as this was the time when Lactate resulted, and the level was > 2.0    3 Hour Severe Sepsis Bundle Completion:    1. Initial Lactic Acid Result:   Recent Labs   Lab Test 09/09/24 2332 09/09/24 2157   LACT 2.3* 2.9*     2. Blood Culture before Antibiotics: Yes      Only 1 blood culture sent due to national shortage of blood products per protocol  Note: Due to a national blood culture bottle shortage, reduced blood cultures may have been drawn on this patient.  3. Broad Spectrum Antibiotics Administered:  yes       Anti-infectives (From admission through now)      Start     Dose/Rate Route Frequency Ordered Stop    09/09/24 2310  piperacillin-tazobactam (ZOSYN) 4.5 g vial to attach to  mL bag        Note to Pharmacy: For [facility], [facility] and [facility]: For Zosyn-naive patients, use the \"Zosyn initial dose + extended infusion\" order panel.    4.5 g  over 30 Minutes Intravenous ONCE 09/09/24 2309 09/10/24 0021          Vancomycin also ordered to be dosed by pharmacy    4. Is initial hypotension present?     No (IV fluid bolus NOT required). IV Fluid volume administered: 3000 ml                    Severe Sepsis reassessment:  1. Repeat Lactic Acid Level within 6 hours of time zero: 2.3  2.  None    MDM "   Gregory Zhong is a 83 year old male this is an 83-year-old gentleman who initially presented after a mechanical fall where he simply slipped.  Head CT was negative for injury.  Have cleared his C-spine clinically.  He did dislocate his left third finger at the PIP joint which was reduced as per the procedure note and confirmed with follow-up radiograph.  Interestingly he did have a fever on arrival and exam revealed a significant right lower extremity infection with cellulitis due to what appears to be a longstanding ulcer on the ball of his foot.  White blood cell count was 24,000 and his initial lactate was 2.9.  After 1 L IV fluid this is improving to 2.3.  He meets severe sepsis criteria.  Broad-spectrum antibiotics with IV Zosyn and vancomycin have been initiated.  3 L of IV fluid ordered as above which is slightly more than 30 cc/kg.  He is otherwise hemodynamically stable.  Blood culture sent.  No evidence of DVT on ultrasound.  No other injuries identified on exam.  He will be admitted for medical management    Disposition   The patient was admitted to the hospital.     Diagnosis     ICD-10-CM    1. Fall, initial encounter  W19.XXXA       2. Closed head injury, initial encounter  S09.90XA       3. Closed Dislocation of left 3rd finger at PIP, initial encounter  S63.259A       4. Ulcer of right foot with fat layer exposed (H)  L97.512       5. Cellulitis of right foot & lower leg  L03.115       6. Elevated lactic acid level  R79.89       7. Fever, unspecified fever cause  R50.9       8. Severe sepsis (H)  A41.9     R65.20       9. Warfarin anticoagulation  Z79.01          Scribe Disclosure:  I, Annmarie Elder, am serving as a scribe at 11:10 PM on 9/9/2024 to document services personally performed by Mike Brewer MD based on my observations and the provider's statements to me.        Mike Brewer MD  09/10/24 3373

## 2024-09-10 NOTE — CONSULTS
[facility]    Orthopedics Consultation    Date of Admission:  9/9/2024    Assessment & Plan     Left long finger PIP joint dislocation status post reduction and splinting    Plan:    I recommended temporary splint immobilization of his left long finger PIP joint for 2 weeks.  At that time he will be seen in clinic and if he remains concentrically reduced we will transition him to bonifacio taping and initiate hand therapy.      Active Problems:    Cellulitis of right foot    Warfarin anticoagulation    Elevated lactic acid level    Closed head injury, initial encounter    Dislocation of finger, initial encounter    Fall, initial encounter    Severe sepsis (H)    Ulcer of right foot with fat layer exposed (H)    Fever, unspecified fever cause    Chris Mueller MD     Code Status    Full Code    Reason for Consult   Reason for consult: I was asked by Dr. Hernández to evaluate this patient for a left hand long finger PIP joint dislocation.    Primary Care Physician   [facility]    Chief Complaint   Left long finger PIP joint dislocation    History is obtained from the patient    History of Present Illness   Gregory Zhong is a 83 year old male who presents with a left long finger PIP joint dislocation status post reduction last night in the ER as a result of a ground-level fall.  He blames his right foot ulcer and swelling for his fall.  He is being admitted for cellulitis of the right lower extremity and concern for osteomyelitis of the foot.  His finger injury was identified and reduced in the ER and orthopedics was consulted for management of this.  He states he has dislocated this joint before but this was when he was much younger.  At his last dislocation he required surgery.  He was splinted after his reduction and is not having much discomfort at this time.    Past Medical History   I have reviewed this patient's medical history and updated it with pertinent information  if needed.   Past Medical History:   Diagnosis Date    Atrial flutter (H)     Bladder cancer (H)     Chronic atrial fibrillation (H)     Chronic kidney disease, stage III (moderate) (H)     Dyslipidemia     Gastroesophageal reflux disease     History of basal cell carcinoma     HLD (hyperlipidemia)     Hyperparathyroidism (H24)     Hypertension     Mild cognitive impairment     Prostate cancer (H)     Tremor        Past Surgical History   I have reviewed this patient's surgical history and updated it with pertinent information if needed.  Past Surgical History:   Procedure Laterality Date    APPENDECTOMY      Cryoablation of left and right renal masses      CYSTOSCOPY      Excision of basal cell carcinoma      MOHS MICROGRAPHIC PROCEDURE      TONSILLECTOMY         Prior to Admission Medications   Prior to Admission Medications   Prescriptions Last Dose Informant Patient Reported? Taking?   Multiple Vitamins-Minerals (PRESERVISION AREDS 2) CHEW 9/9/2024  Yes Yes   Sig: Take 1 tablet by mouth 2 times daily.   alendronate (FOSAMAX) 70 MG tablet 9/7/2024  Yes Yes   Sig: Take 70 mg by mouth every 7 days. On Saturdays   amLODIPine (NORVASC) 10 MG tablet 9/9/2024  Yes Yes   Sig: Take 10 mg by mouth daily.   famotidine (PEPCID) 20 MG tablet 9/9/2024 at am  Yes Yes   Sig: Take 20 mg by mouth 2 times daily.   hypromellose-dextran (HYPROMELLOSE-DEXTRAN 0.3-0.1%) 0.1-0.3 % ophthalmic solution prn  Yes Yes   Sig: Place 1-2 drops into both eyes 4 times daily as needed for dry eyes.   isosorbide mononitrate (IMDUR) 60 MG 24 hr tablet 9/9/2024  Yes Yes   Sig: Take 60 mg by mouth daily.   metoprolol succinate ER (TOPROL XL) 25 MG 24 hr tablet 9/9/2024  Yes Yes   Sig: Take 25 mg by mouth daily.   patiromer (VELTASSA) 8.4 g packet 9/9/2024  Yes Yes   Sig: Take 8.4 g by mouth. on Mon Wed Fri at 1500   rosuvastatin (CRESTOR) 10 MG tablet 9/9/2024  Yes Yes   Sig: Take 10 mg by mouth daily.   vitamin D3 (CHOLECALCIFEROL) 50 mcg (2000  units) tablet 9/9/2024  Yes Yes   Sig: Take 1 tablet by mouth daily.   warfarin ANTICOAGULANT (COUMADIN) 5 MG tablet 7.5mg on 9/8/2024 at pm  Yes Yes   Sig: Take 5 mg by mouth daily.      Facility-Administered Medications: None     Allergies   No Known Allergies    Social History   I have reviewed this patient's social history and updated it with pertinent information if needed. Gregory Zhong  reports that he has never smoked. He does not have any smokeless tobacco history on file.    Family History   I have reviewed this patient's family history and updated it with pertinent information if needed.   No family history on file.    Review of Systems   The 10 point Review of Systems is negative other than noted in the HPI or here.     Physical Exam   Temp: 97.9  F (36.6  C) Temp src: Oral BP: (!) 135/92 Pulse: 73   Resp: 15 SpO2: 97 % O2 Device: None (Room air)    Vital Signs with Ranges  Temp:  [97.4  F (36.3  C)-100.6  F (38.1  C)] 97.9  F (36.6  C)  Pulse:  [50-94] 73  Resp:  [11-24] 15  BP: (110-156)/() 135/92  SpO2:  [91 %-99 %] 97 %  207 lbs 3.72 oz    Constitutional: awake, alert, cooperative, no apparent distress, and appears stated age  Eyes: extra-ocular muscles intact, no scleral icterus  ENT: normocepalic, atraumatic without obvious abnormality  Hematologic / Lymphatic: No lymphedema   Respiratory: no increased work of breathing, symmetric chest wall expansion  Skin: normal skin color, about the PIP joint left long finger  Musculoskeletal: Splint in place.  Minimally tender about the PIP joint.  Neurologic: Motor Exam:  moves all extremities well and symmetrically  Sensory:  Sensory intact  Neuropsychiatric: General: normal, calm, and normal eye contact  Level of consciousness: alert / normal  Affect: normal    Data   Results for orders placed or performed during the hospital encounter of 09/09/24 (from the past 24 hour(s))   Comprehensive metabolic panel   Result Value Ref Range    Sodium 141 135 -  145 mmol/L    Potassium 6.1 (HH) 3.4 - 5.3 mmol/L    Carbon Dioxide (CO2) 25 22 - 29 mmol/L    Anion Gap 8 7 - 15 mmol/L    Urea Nitrogen 37.9 (H) 8.0 - 23.0 mg/dL    Creatinine 1.66 (H) 0.67 - 1.17 mg/dL    GFR Estimate 41 (L) >60 mL/min/1.73m2    Calcium 10.6 (H) 8.8 - 10.4 mg/dL    Chloride 108 (H) 98 - 107 mmol/L    Glucose 114 (H) 70 - 99 mg/dL    Alkaline Phosphatase 116 40 - 150 U/L    AST 25 0 - 45 U/L    ALT 26 0 - 70 U/L    Protein Total 6.7 6.4 - 8.3 g/dL    Albumin 3.9 3.5 - 5.2 g/dL    Bilirubin Total 0.6 <=1.2 mg/dL   CBC with Platelets & Differential    Narrative    The following orders were created for panel order CBC with Platelets & Differential.  Procedure                               Abnormality         Status                     ---------                               -----------         ------                     CBC with platelets and d...[[id number]]  Abnormal            Final result               Manual Differential[[id number]]          Abnormal            Final result                 Please view results for these tests on the individual orders.   Lactic acid whole blood   Result Value Ref Range    Lactic Acid 2.9 (H) 0.7 - 2.0 mmol/L   CBC with platelets and differential   Result Value Ref Range    WBC Count 24.0 (H) 4.0 - 11.0 10e3/uL    RBC Count 4.51 4.40 - 5.90 10e6/uL    Hemoglobin 13.2 (L) 13.3 - 17.7 g/dL    Hematocrit 41.2 40.0 - 53.0 %    MCV 91 78 - 100 fL    MCH 29.3 26.5 - 33.0 pg    MCHC 32.0 31.5 - 36.5 g/dL    RDW 13.8 10.0 - 15.0 %    Platelet Count 232 150 - 450 10e3/uL    NRBCs per 100 WBC 0 <1 /100    Absolute NRBCs 0.0 10e3/uL   Manual Differential   Result Value Ref Range    % Neutrophils 87 %    % Lymphocytes 11 %    % Monocytes 2 %    % Eosinophils 0 %    % Basophils 0 %    Absolute Neutrophils 20.9 (H) 1.6 - 8.3 10e3/uL    Absolute Lymphocytes 2.6 0.8 - 5.3 10e3/uL    Absolute Monocytes 0.5 0.0 - 1.3 10e3/uL    Absolute Eosinophils 0.0 0.0 - 0.7 10e3/uL    Absolute  Basophils 0.0 0.0 - 0.2 10e3/uL    RBC Morphology Confirmed RBC Indices     Platelet Assessment  Automated Count Confirmed. Platelet morphology is normal.     Automated Count Confirmed. Platelet morphology is normal.    Acanthocytes Slight (A) None Seen    Maxwell Cells Slight (A) None Seen    Elliptocytes Slight (A) None Seen   Hemoglobin A1c   Result Value Ref Range    Hemoglobin A1C 5.2 <5.7 %   Head CT w/o contrast    Narrative    EXAM: CT HEAD W/O CONTRAST  LOCATION: [facility]  DATE: 9/9/2024    INDICATION: fall, nose bloody, blood thinners  COMPARISON: 7/31/2024.  TECHNIQUE: Routine CT Head without IV contrast. Multiplanar reformats. Dose reduction techniques were used.    FINDINGS:  INTRACRANIAL CONTENTS: No intracranial hemorrhage, extraaxial collection, or mass effect.  No CT evidence of acute infarct. There is scattered low-attenuation within the periventricular and subcortical white matter consistent with diffuse small vessel   ischemic disease. The ventricular system, basal cisterns and cortical sulci are consistent with volume loss.     VISUALIZED ORBITS/SINUSES/MASTOIDS: No intraorbital abnormality. No paranasal sinus mucosal disease. No middle ear or mastoid effusion.    BONES/SOFT TISSUES: No acute abnormality.      Impression    IMPRESSION:  1.  No CT finding of a mass, hemorrhage or focal area suggestive of acute infarct.  2.  Stable diffuse age related changes.   Fingers XR, 2-3 views, left    Narrative    EXAM: XR FINGER LEFT G/E 2 VIEWS  LOCATION: [facility]  DATE: 9/9/2024    INDICATION: Suspected dislocation after fall.  COMPARISON: None available.      Impression    IMPRESSION: Severe ulnar and posterior subluxation of the left long finger middle phalanx at the proximal interphalangeal joint.  A definitive fracture is not identified. Attention on postreduction imaging.    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 Mild polyarticular osteoarthritis throughout the remainder of the left hand and wrist. Healed, instrumented fracture of the second metacarpal shaft.     Potassium   Result Value Ref Range    Potassium 5.2 3.4 - 5.3 mmol/L   Blood Culture Peripheral Blood    Specimen: Peripheral Blood   Result Value Ref Range    Culture No growth after 12 hours    INR   Result Value Ref Range    INR 3.98 (H) 0.85 - 1.15   CRP inflammation   Result Value Ref Range    CRP Inflammation 72.26 (H) <5.00 mg/L   [place] Draw *Canceled*    Narrative    The following orders were created for panel order [place] Draw.  Procedure                               Abnormality         Status                     ---------                               -----------         ------                       Please view results for these tests on the individual orders.   Lactic acid whole blood   Result Value Ref Range    Lactic Acid 2.3 (H) 0.7 - 2.0 mmol/L   Fingers XR, 2-3 views, left    Narrative    EXAM: XR FINGER LEFT G/E 2 VIEWS  LOCATION: [facility]  DATE: 9/10/2024    INDICATION: Post reduction  COMPARISON: 09/09/2024      Impression    IMPRESSION: The dislocated middle phalanx of the left third finger has been reduced and now properly articulates at the PIP joint. No fracture. 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 Erosive osteoarthritis at the DIP joint of   the fifth finger. Internal fixation screws left second metacarpal.     US Lower Extremity Venous Duplex Right    Narrative    EXAM: US LOWER EXTREMITY VENOUS DUPLEX RIGHT  LOCATION: [facility]  DATE: 9/10/2024    INDICATION: Leg swelling and infection  COMPARISON: None.  TECHNIQUE: Venous Duplex ultrasound of the right lower extremity with and without compression, augmentation and duplex. Color flow and spectral Doppler with waveform analysis performed.    FINDINGS: Exam includes the common femoral,  femoral, popliteal, and contralateral common femoral veins as well as segmentally visualized deep calf veins and greater saphenous vein.     RIGHT: No deep vein thrombosis. No superficial thrombophlebitis. No popliteal cyst. Soft tissue edema noted in the right calf.      Impression    IMPRESSION:  1.  No deep venous thrombosis in the right lower extremity.   Erythrocyte sedimentation rate auto   Result Value Ref Range    Erythrocyte Sedimentation Rate 14 0 - 20 mm/hr   Basic metabolic panel   Result Value Ref Range    Sodium 138 135 - 145 mmol/L    Potassium 4.8 3.4 - 5.3 mmol/L    Chloride 109 (H) 98 - 107 mmol/L    Carbon Dioxide (CO2) 22 22 - 29 mmol/L    Anion Gap 7 7 - 15 mmol/L    Urea Nitrogen 32.0 (H) 8.0 - 23.0 mg/dL    Creatinine 1.45 (H) 0.67 - 1.17 mg/dL    GFR Estimate 48 (L) >60 mL/min/1.73m2    Calcium 9.7 8.8 - 10.4 mg/dL    Glucose 117 (H) 70 - 99 mg/dL   CBC with platelets   Result Value Ref Range    WBC Count 20.2 (H) 4.0 - 11.0 10e3/uL    RBC Count 4.00 (L) 4.40 - 5.90 10e6/uL    Hemoglobin 11.8 (L) 13.3 - 17.7 g/dL    Hematocrit 36.3 (L) 40.0 - 53.0 %    MCV 91 78 - 100 fL    MCH 29.5 26.5 - 33.0 pg    MCHC 32.5 31.5 - 36.5 g/dL    RDW 13.8 10.0 - 15.0 %    Platelet Count 161 150 - 450 10e3/uL   INR   Result Value Ref Range    INR 3.70 (H) 0.85 - 1.15   Lactic acid whole blood   Result Value Ref Range    Lactic Acid 1.2 0.7 - 2.0 mmol/L   MR Foot Right w/o Contrast    Narrative    Exam: MRI of the right foot without contrast dated 9/10/2024.    COMPARISON: None.    CLINICAL HISTORY: Plantar foot ulcer with sepsis.    TECHNIQUE: Multiplanar, multisequence MR imaging of the right foot was  obtained using standard sequences in 3 orthogonal planes without the  use of intravenous or intra-articular gadolinium and contrast.    FINDINGS:    There is an ulcerative defect along the plantar aspect of the distal  first metatarsal. Adjacent to the ulcer, there is low T1 signal with  increased T2 signal in  the tibial greater than fibular sesamoid.  Contrast was not administered. Mild increased T2 signal without  increased T1 signal in the great toe proximal phalanx.    Low T1 signal with increased T2 signal in the distal aspect of the  right third toe to phalanx, adjacent to a soft tissue defect.    Atrophy within the musculature about the forefoot with diffuse edema  within the musculature. No discrete abscess although contrast was not  administered. There is diffuse subcutaneous soft tissue edema.    No full-thickness tendon tear or tendon retraction. The Lisfranc  ligament is intact.      Impression    IMPRESSION:  1. Ulcerative lesion along the plantar aspect of the distal first  metatarsal. There is low T1 signal, with increased T2 signal in the  adjacent sesamoids, tibial greater than fibular, MRI findings most  consistent with osteomyelitis. There is subtle increased bone marrow  edema in the great toe proximal phalanx without low T1 signal,  presumed reactive osteitis.    2. Low T1 signal with increased T2 signal in the distal aspect of the  third toe distal phalanx, MRI findings most consistent with  osteomyelitis, this is adjacent to a soft tissue defect, sagittal  series 4 image 9.    3. Diffuse soft tissue edema within the musculature, likely  representing myositis. No discrete abscess although contrast was not  administered.    4. Diffuse subcutaneous soft tissue edema along dorsal aspect of the  foot, findings most consistent with cellulitis.    SUGEY SKAGGS MD         SYSTEM ID:  [id number]   UA with Microscopic reflex to Culture    Specimen: Urine, Catheter   Result Value Ref Range    Color Urine Yellow Colorless, Straw, Light Yellow, Yellow    Appearance Urine Clear Clear    Glucose Urine Negative Negative mg/dL    Bilirubin Urine Negative Negative    Ketones Urine Negative Negative mg/dL    Specific Gravity Urine 1.023 1.003 - 1.035    Blood Urine Small (A) Negative    pH Urine 6.5 5.0 - 7.0     Protein Albumin Urine 50 (A) Negative mg/dL    Urobilinogen Urine 2.0 Normal, 2.0 mg/dL    Nitrite Urine Negative Negative    Leukocyte Esterase Urine Negative Negative    Mucus Urine Present (A) None Seen /LPF    RBC Urine 25 (H) <=2 /HPF    WBC Urine 2 <=5 /HPF    Squamous Epithelials Urine <1 <=1 /HPF    Hyaline Casts Urine 2 <=2 /LPF    Narrative    Urine Culture not indicated

## 2024-09-10 NOTE — CONSULTS
[facility]  CONSULTATION NOTE    CHIEF COMPLAINT:      I was asked by Tyrone Hernández MD  to evaluate this patient for right foot.    PATIENT HISTORY:  Gregory Zhong is a 83 year old male  with a past medical history significant for what's listed below, presented following a fall at home. He apparently slipped in his driveway and arrived with a dislocated third finger.   -Podiatry has been consulted for his right foot, where an ulcer has been present for an unknown amount of time. Patient was admitted with sepsis and cellulitis from the site. His son is involved in his care. He apparently has had signs of confusion and possible dementia, although not currently diagnosed. His two sons are at bedside. His sister was also called. At bedside, patient states he feels fairly well. No pain to site. States the ulcer has been open for at least a month, but was likely a callus and may have been open prior to that.         Review of Systems:  A 10 point review of systems was performed and is positive for that noted above in the patient history.  All other areas are negative.     PAST MEDICAL HISTORY:   Past Medical History:   Diagnosis Date    Atrial flutter (H)     Bladder cancer (H)     Chronic atrial fibrillation (H)     Chronic kidney disease, stage III (moderate) (H)     Dyslipidemia     Gastroesophageal reflux disease     History of basal cell carcinoma     HLD (hyperlipidemia)     Hyperparathyroidism (H24)     Hypertension     Mild cognitive impairment     Prostate cancer (H)     Tremor         PAST SURGICAL HISTORY:   Past Surgical History:   Procedure Laterality Date    APPENDECTOMY      Cryoablation of left and right renal masses      CYSTOSCOPY      Excision of basal cell carcinoma      MOHS MICROGRAPHIC PROCEDURE      TONSILLECTOMY          MEDICATIONS:  Reviewed in Epic. Current.     ALLERGIES:  No Known Allergies     SOCIAL HISTORY:   Social History     Socioeconomic History     "Marital status:      Spouse name: Not on file    Number of children: Not on file    Years of education: Not on file    Highest education level: Not on file   Occupational History    Not on file   Tobacco Use    Smoking status: Never    Smokeless tobacco: Not on file   Substance and Sexual Activity    Alcohol use: Not on file    Drug use: Not on file    Sexual activity: Not on file   Other Topics Concern    Not on file   Social History Narrative    Not on file     Social Determinants of Health     Financial Resource Strain: Not on file   Food Insecurity: Not on file   Transportation Needs: Not on file   Physical Activity: Not on file   Stress: Not on file   Social Connections: Not on file   Interpersonal Safety: Not on file   Housing Stability: Not on file        FAMILY HISTORY: No family history on file.     EXAM:Vitals: /69 (BP Location: Left arm)   Pulse 50   Temp 97.4  F (36.3  C) (Oral)   Resp 18   Ht 1.854 m (6' 1\")   Wt 94 kg (207 lb 3.7 oz)   SpO2 91%   BMI 27.34 kg/m    BMI= Body mass index is 27.34 kg/m .    LABS:   .a1c  Last Comprehensive Metabolic Panel:  Sodium   Date Value Ref Range Status   09/10/2024 138 135 - 145 mmol/L Final     Potassium   Date Value Ref Range Status   09/10/2024 4.8 3.4 - 5.3 mmol/L Final     Chloride   Date Value Ref Range Status   09/10/2024 109 (H) 98 - 107 mmol/L Final     Carbon Dioxide (CO2)   Date Value Ref Range Status   09/10/2024 22 22 - 29 mmol/L Final     Anion Gap   Date Value Ref Range Status   09/10/2024 7 7 - 15 mmol/L Final     Glucose   Date Value Ref Range Status   09/10/2024 117 (H) 70 - 99 mg/dL Final     Urea Nitrogen   Date Value Ref Range Status   09/10/2024 32.0 (H) 8.0 - 23.0 mg/dL Final     Creatinine   Date Value Ref Range Status   09/10/2024 1.45 (H) 0.67 - 1.17 mg/dL Final     GFR Estimate   Date Value Ref Range Status   09/10/2024 48 (L) >60 mL/min/1.73m2 Final     Comment:     eGFR calculated using 2021 CKD-EPI equation. " "    Calcium   Date Value Ref Range Status   09/10/2024 9.7 8.8 - 10.4 mg/dL Final     Comment:     Reference intervals for this test were updated on 7/16/2024 to reflect our healthy population more accurately. There may be differences in the flagging of prior results with similar values performed with this method. Those prior results can be interpreted in the context of the updated reference intervals.     Lab Results   Component Value Date    WBC 20.2 09/10/2024     Lab Results   Component Value Date    RBC 4.00 09/10/2024     Lab Results   Component Value Date    HGB 11.8 09/10/2024     Lab Results   Component Value Date    HCT 36.3 09/10/2024     Lab Results   Component Value Date     09/10/2024      Lab Results   Component Value Date    INR 3.70 09/10/2024    INR 3.98 09/09/2024        General appearance: Patient is alert and fully cooperative with history & exam.  No sign of distress is noted during the visit.      Respiratory: Breathing is regular & unlabored while sitting.      HEENT: Hearing is intact to spoken word.  Speech is clear.  No gross evidence of visual impairment that would impact ambulation.      Dermatologic: Right foot submet one ulcer with necrotic tissue, probes close to bone. Cellulitis to hallux, second digit and midfoot. Third digit with small, dry callus. Non draining. No cellulitis to third toe.      Vascular: Dorsalis pedis and posterior tibial pulses are weakly palpable.      Neurologic: Lower extremity sensation is diminished, bilateral foot, to light touch.  No evidence of neurological-based weakness or contracture in the lower extremities.       Musculoskeletal: Patient is ambulatory without an assistive device or brace.  No gross foot or ankle deformity noted.       Psychiatric: Affect is pleasant & appropriate.      All cultures:  No results for input(s): \"CULTURE\" in the last 168 hours.     IMAGING:     IMPRESSION:  1. Ulcerative lesion along the plantar aspect of the " distal first  metatarsal. There is low T1 signal, with increased T2 signal in the  adjacent sesamoids, tibial greater than fibular, MRI findings most  consistent with osteomyelitis. There is subtle increased bone marrow  edema in the great toe proximal phalanx without low T1 signal,  presumed reactive osteitis.     2. Low T1 signal with increased T2 signal in the distal aspect of the  third toe distal phalanx, MRI findings most consistent with  osteomyelitis, this is adjacent to a soft tissue defect, sagittal  series 4 image 9.     3. Diffuse soft tissue edema within the musculature, likely  representing myositis. No discrete abscess although contrast was not  administered.     4. Diffuse subcutaneous soft tissue edema along dorsal aspect of the  foot, findings most consistent with cellulitis.    ASSESSMENT:  Right foot submet one ulcer, osteomyelitis to sesmaoids.   Right foot third digit, preulcerative lesion  Supra therapeutic INR, > 3.8 on admission     MEDICAL DECISION MAKING:   -Discussed all findings with patient. Chart and imaging reviewed.   -Significant time spent reviewing imaging and all findings with patient and family members.     -Primary issue is submet one ulcer, which is in close proximity to sesamoids and metatarsal head. Given this, recommendation made for sesamoidectomy for infection removal, discussed with patient though this would leave large open ulcer present and would require extended time NWB to allow site to heal. Other option presented was to also do a first ray resection, which would allow plantar ulcer to be closed and would have a faster, more definitive healing time. He's unsure about this, with concern for balance. Discussed with patient that walking is usually fairly easy post op.   -Third digit with preulcerative lesion, doesn't clinically appear infected. Options for either bone biopsy vs partial toe amputation.     -Vascular studies ordered to evaluate for arterial disease.  Patient's family with large concern for this and ask about vascular surgery involvement. Discussed with them this would require a transfer if needed.     -INR > 3 on admission. Goal INR for surgery approx 1.7.     -Will follow up with patient following vascular studies and to allow time for them to consider all discussed options. Likely surgery to be done on Thursday.     -WB to heel at this time. Will place dressing order for today.       Thank you for the consultation request and the opportunity to participate in the care of Gregory Workman DPM   [facility]  [phone]    Greater than 40 minutes were spent today, reviewing previous hospital records, counseling and educating the patient on the ongoing treatment plan and coordinating care.

## 2024-09-10 NOTE — PROGRESS NOTES
Patient seen and examined.  Chart reviewed.  Please see admission history and physical from earlier today by Dr. Hernández for details.

## 2024-09-10 NOTE — PHARMACY-VANCOMYCIN DOSING SERVICE
"Pharmacy Vancomycin Initial Note  Date of Service September 10, 2024  Patient's  1940  83 year old, male    Indication: Osteomyelitis; SSTI    Current estimated CrCl = Estimated Creatinine Clearance: 45.6 mL/min (A) (based on SCr of 1.66 mg/dL (H)).    Creatinine for last 3 days  2024:  9:57 PM Creatinine 1.66 mg/dL    Recent Vancomycin Level(s) for last 3 days  No results found for requested labs within last 3 days.      Vancomycin IV Administrations (past 72 hours)                     Vancomycin (VANCOCIN) 2,000 mg in 0.9% NaCl 500 mL intermittent infusion (mg) 2,000 mg New Bag 09/10/24 0241                    Nephrotoxins and other renal medications (From now, onward)      Start     Dose/Rate Route Frequency Ordered Stop    09/10/24 2200  vancomycin (VANCOCIN) 1,250 mg in 0.9% NaCl 250 mL intermittent infusion         1,250 mg  166.7 mL/hr over 90 Minutes Intravenous EVERY 24 HOURS 09/10/24 0533      09/10/24 0600  piperacillin-tazobactam (ZOSYN) 3.375 g vial to attach to  mL bag        Note to Pharmacy: For [facility], [facility] and [facility]: For Zosyn-naive patients, use the \"Zosyn initial dose + extended infusion\" order panel.    3.375 g  over 30 Minutes Intravenous EVERY 6 HOURS 09/10/24 0221              Contrast Orders - past 72 hours (72h ago, onward)      None            [employer] Prediction of Planned Initial Vancomycin Regimen  Loading dose: 2000 mg  Regimen: 1250 mg IV every 24 hours.  Regimen Start time: 22:00 on 09/10/2024  Exposure target: AUC24 (range)400-600 mg/L.hr   AUC24,ss: 593 mg/L.hr  Probability of AUC24 > 400: 87 %  Ctrough,ss: 19.5 mg/L  Probability of Ctrough,ss > 20: 48 %  Probability of nephrotoxicity (Lodise MARIE ): 16 %        Plan:  Start vancomycin 1250 mg IV q24h after loading dose.   Vancomycin monitoring method: AUC  Vancomycin therapeutic monitoring goal: 400-600 mg*h/L  Pharmacy will check vancomycin levels as appropriate in 1-3 Days.    Serum creatinine levels will be " ordered daily for the first week of therapy and at least twice weekly for subsequent weeks.      Abimael Beatty, [facility]

## 2024-09-10 NOTE — ED NOTES
"[facility]  ED Nurse Handoff Report    ED Chief complaint: Hand Pain and Joint Swelling  . ED Diagnosis:   Final diagnoses:   Fall, initial encounter   Closed head injury, initial encounter   Closed Dislocation of left 3rd finger at PIP, initial encounter   Ulcer of right foot with fat layer exposed (H)   Cellulitis of right foot & lower leg   Elevated lactic acid level   Fever, unspecified fever cause   Severe sepsis (H)   Warfarin anticoagulation       Allergies: No Known Allergies    Code Status: Full Code    Activity level - Baseline/Home:  independent.  Activity Level - Current:   assist of 2.   Lift room needed: No.   Bariatric: No   Needed: No   Isolation: No.   Infection: Not Applicable.     Respiratory status: Room air    Vital Signs (within 30 minutes):   Vitals:    09/10/24 0036 09/10/24 0100 09/10/24 0130 09/10/24 0200   BP:  (!) 139/122 126/67 127/68   Pulse:   70 63   Resp:   12 24   Temp:       TempSrc:       SpO2:   97% 96%   Weight: 94 kg (207 lb 3.7 oz)      Height:    1.854 m (6' 1\")       Cardiac Rhythm:  ,      Pain level:    Patient confused: No.   Patient Falls Risk: nonskid shoes/slippers when out of bed, patient and family education, assistive device/personal items within reach, and activity supervised.   Elimination Status: Has voided     Patient Report - Initial Complaint: Hand Pain and Joint Swelling   .   Focused Assessment: Gregory Zhong is a 83 year old male with history of hypertension, CKD stage 3, and atrial fibrillation on Coumadin who presents with a male  after a fall. Patient reports that he was taking out the trash tonight when he slipped on wet grass and fell in the driveway. He might have hit his head and he endorses injury to his left middle finger. It appears to be dislocated. Male  says that there in concern for a cellulitis infection in his right leg and patient presents with a deep ulcer on the bottom of his right foot " that he says has been there for years but has been improving. See images below. No recent nausea, vomiting, fever, or cough. Male  denies history of diabetes mellitus.      Abnormal Results:   Labs Ordered and Resulted from Time of ED Arrival to Time of ED Departure   COMPREHENSIVE METABOLIC PANEL - Abnormal       Result Value    Sodium 141      Potassium 6.1 (*)     Carbon Dioxide (CO2) 25      Anion Gap 8      Urea Nitrogen 37.9 (*)     Creatinine 1.66 (*)     GFR Estimate 41 (*)     Calcium 10.6 (*)     Chloride 108 (*)     Glucose 114 (*)     Alkaline Phosphatase 116      AST 25      ALT 26      Protein Total 6.7      Albumin 3.9      Bilirubin Total 0.6     LACTIC ACID WHOLE BLOOD - Abnormal    Lactic Acid 2.9 (*)    CBC WITH PLATELETS AND DIFFERENTIAL - Abnormal    WBC Count 24.0 (*)     RBC Count 4.51      Hemoglobin 13.2 (*)     Hematocrit 41.2      MCV 91      MCH 29.3      MCHC 32.0      RDW 13.8      Platelet Count 232      NRBCs per 100 WBC 0      Absolute NRBCs 0.0     MANUAL DIFFERENTIAL - Abnormal    % Neutrophils 87      % Lymphocytes 11      % Monocytes 2      % Eosinophils 0      % Basophils 0      Absolute Neutrophils 20.9 (*)     Absolute Lymphocytes 2.6      Absolute Monocytes 0.5      Absolute Eosinophils 0.0      Absolute Basophils 0.0      RBC Morphology Confirmed RBC Indices      Platelet Assessment        Value: Automated Count Confirmed. Platelet morphology is normal.    Acanthocytes Slight (*)     Paul Cells Slight (*)     Elliptocytes Slight (*)    INR - Abnormal    INR 3.98 (*)    LACTIC ACID WHOLE BLOOD - Abnormal    Lactic Acid 2.3 (*)    CRP INFLAMMATION - Abnormal    CRP Inflammation 72.26 (*)    POTASSIUM - Normal    Potassium 5.2     ROUTINE UA WITH MICROSCOPIC REFLEX TO CULTURE   ERYTHROCYTE SEDIMENTATION RATE AUTO   BLOOD CULTURE        US Lower Extremity Venous Duplex Right   Final Result   IMPRESSION:   1.  No deep venous thrombosis in the right lower extremity.       Fingers XR, 2-3 views, left   Final Result   IMPRESSION: The dislocated middle phalanx of the left third finger has been reduced and now properly articulates at the PIP joint. No fracture. 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 Erosive osteoarthritis at the DIP joint of    the fifth finger. Internal fixation screws left second metacarpal.         Fingers XR, 2-3 views, left   Final Result   IMPRESSION: Severe ulnar and posterior subluxation of the left long finger middle phalanx at the proximal interphalangeal joint.  A definitive fracture is not identified. Attention on postreduction imaging.      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 Mild polyarticular osteoarthritis throughout the remainder of the left hand and wrist. Healed, instrumented fracture of the second metacarpal shaft.         Head CT w/o contrast   Final Result   IMPRESSION:   1.  No CT finding of a mass, hemorrhage or focal area suggestive of acute infarct.   2.  Stable diffuse age related changes.      MR Foot Right w/o Contrast    (Results Pending)       Treatments provided: see MAR  Family Comments: Son at bedside  OBS brochure/video discussed/provided to patient:  Yes  ED Medications:   Medications   Vancomycin (VANCOCIN) 2,000 mg in 0.9% NaCl 500 mL intermittent infusion (2,000 mg Intravenous $New Bag 9/10/24 0241)   famotidine (PEPCID) tablet 20 mg (has no administration in time range)   metoprolol succinate ER (TOPROL XL) 24 hr tablet 25 mg (has no administration in time range)   LORazepam (ATIVAN) tablet 0.5 mg (has no administration in time range)   lidocaine 1 % 0.1-1 mL (has no administration in time range)   lidocaine (LMX4) cream (has no administration in time range)   sodium chloride (PF) 0.9% PF flush 3 mL (3 mLs Intracatheter Not Given 9/10/24 0234)   sodium chloride (PF) 0.9% PF flush 3 mL (has no administration in time range)   senna-docusate  "(SENOKOT-S/PERICOLACE) 8.6-50 MG per tablet 1 tablet (has no administration in time range)     Or   senna-docusate (SENOKOT-S/PERICOLACE) 8.6-50 MG per tablet 2 tablet (has no administration in time range)   calcium carbonate (TUMS) chewable tablet 1,000 mg (has no administration in time range)   acetaminophen (TYLENOL) tablet 650 mg (has no administration in time range)     Or   acetaminophen (TYLENOL) Suppository 650 mg (has no administration in time range)   melatonin tablet 1 mg (has no administration in time range)   polyethylene glycol (MIRALAX) Packet 17 g (has no administration in time range)   ondansetron (ZOFRAN ODT) ODT tab 4 mg (has no administration in time range)     Or   ondansetron (ZOFRAN) injection 4 mg (has no administration in time range)   piperacillin-tazobactam (ZOSYN) 3.375 g vial to attach to  mL bag (has no administration in time range)   piperacillin-tazobactam (ZOSYN) 4.5 g vial to attach to  mL bag (0 g Intravenous Stopped 9/10/24 0104)       Drips infusing:  Yes  For the majority of the shift this patient was Green.   Interventions performed were n/a.    Sepsis treatment initiated: Yes    Per the ED Provider, Time Zero for severe sepsis or septic shock is:  2157    3 Hour Severe Sepsis Bundle Completion:  1. Initial Lactic Acid Result:   Recent Labs   Lab Test 09/09/24  2332 09/09/24  2157   LACT 2.3* 2.9*     2. Blood Cultures before Antibiotics: Yes  3. Broad Spectrum Antibiotics Administered:     Anti-infectives (From now, onward)      Start     Dose/Rate Route Frequency Ordered Stop    09/10/24 0600  piperacillin-tazobactam (ZOSYN) 3.375 g vial to attach to  mL bag        Note to Pharmacy: For [facility], [facility] and [facility]: For Zosyn-naive patients, use the \"Zosyn initial dose + extended infusion\" order panel.    3.375 g  over 30 Minutes Intravenous EVERY 6 HOURS 09/10/24 0221      09/10/24 0115  Vancomycin (VANCOCIN) 2,000 mg in 0.9% NaCl 500 mL intermittent infusion         " 2,000 mg  250 mL/hr over 2 Hours Intravenous ONCE 09/10/24 0112            4. 3000 ml of IV fluids have been given so far      6 Hour Severe Sepsis Bundle Completion:    1. Repeat Lactic Acid Level: Not drawn  2. Patient currently on Vasopressors =  No    Cares/treatment/interventions/medications to be completed following ED care: MRI, IV abx    ED Nurse Name: Ariella Wells RN  3:01 AM    RECEIVING UNIT ED HANDOFF REVIEW    Above ED Nurse Handoff Report was reviewed: Yes  Reviewed by: Ernestine Hernandez RN on September 10, 2024 at 8:12 PM   MAX Simon called the ED to inform them the note was read: Yes

## 2024-09-11 ENCOUNTER — APPOINTMENT (OUTPATIENT)
Dept: PHYSICAL THERAPY | Facility: CLINIC | Age: 84
DRG: 853 | End: 2024-09-11
Attending: INTERNAL MEDICINE
Payer: COMMERCIAL

## 2024-09-11 LAB
ANION GAP SERPL CALCULATED.3IONS-SCNC: 9 MMOL/L (ref 7–15)
BUN SERPL-MCNC: 31.6 MG/DL (ref 8–23)
CALCIUM SERPL-MCNC: 9.5 MG/DL (ref 8.8–10.4)
CHLORIDE SERPL-SCNC: 109 MMOL/L (ref 98–107)
CREAT SERPL-MCNC: 1.53 MG/DL (ref 0.67–1.17)
EGFRCR SERPLBLD CKD-EPI 2021: 45 ML/MIN/1.73M2
ERYTHROCYTE [DISTWIDTH] IN BLOOD BY AUTOMATED COUNT: 14 % (ref 10–15)
GLUCOSE SERPL-MCNC: 97 MG/DL (ref 70–99)
HCO3 SERPL-SCNC: 20 MMOL/L (ref 22–29)
HCT VFR BLD AUTO: 34.4 % (ref 40–53)
HGB BLD-MCNC: 11.1 G/DL (ref 13.3–17.7)
INR PPP: 2.46 (ref 0.85–1.15)
MAGNESIUM SERPL-MCNC: 2 MG/DL (ref 1.7–2.3)
MCH RBC QN AUTO: 29.3 PG (ref 26.5–33)
MCHC RBC AUTO-ENTMCNC: 32.3 G/DL (ref 31.5–36.5)
MCV RBC AUTO: 91 FL (ref 78–100)
PLATELET # BLD AUTO: 183 10E3/UL (ref 150–450)
POTASSIUM SERPL-SCNC: 4.5 MMOL/L (ref 3.4–5.3)
RBC # BLD AUTO: 3.79 10E6/UL (ref 4.4–5.9)
SODIUM SERPL-SCNC: 138 MMOL/L (ref 135–145)
WBC # BLD AUTO: 19.5 10E3/UL (ref 4–11)

## 2024-09-11 PROCEDURE — 85014 HEMATOCRIT: CPT | Performed by: INTERNAL MEDICINE

## 2024-09-11 PROCEDURE — 99233 SBSQ HOSP IP/OBS HIGH 50: CPT | Performed by: INTERNAL MEDICINE

## 2024-09-11 PROCEDURE — 36415 COLL VENOUS BLD VENIPUNCTURE: CPT | Performed by: INTERNAL MEDICINE

## 2024-09-11 PROCEDURE — 120N000001 HC R&B MED SURG/OB

## 2024-09-11 PROCEDURE — 250N000011 HC RX IP 250 OP 636: Performed by: INTERNAL MEDICINE

## 2024-09-11 PROCEDURE — 250N000013 HC RX MED GY IP 250 OP 250 PS 637: Performed by: INTERNAL MEDICINE

## 2024-09-11 PROCEDURE — 85610 PROTHROMBIN TIME: CPT | Performed by: INTERNAL MEDICINE

## 2024-09-11 PROCEDURE — 83735 ASSAY OF MAGNESIUM: CPT | Performed by: INTERNAL MEDICINE

## 2024-09-11 PROCEDURE — 97530 THERAPEUTIC ACTIVITIES: CPT | Mod: GP | Performed by: PHYSICAL THERAPIST

## 2024-09-11 PROCEDURE — 82374 ASSAY BLOOD CARBON DIOXIDE: CPT | Performed by: INTERNAL MEDICINE

## 2024-09-11 PROCEDURE — 97161 PT EVAL LOW COMPLEX 20 MIN: CPT | Mod: GP | Performed by: PHYSICAL THERAPIST

## 2024-09-11 PROCEDURE — 84295 ASSAY OF SERUM SODIUM: CPT | Performed by: INTERNAL MEDICINE

## 2024-09-11 PROCEDURE — 99233 SBSQ HOSP IP/OBS HIGH 50: CPT | Mod: 57 | Performed by: PODIATRIST

## 2024-09-11 RX ORDER — QUETIAPINE FUMARATE 25 MG/1
25 TABLET, FILM COATED ORAL ONCE
Status: COMPLETED | OUTPATIENT
Start: 2024-09-11 | End: 2024-09-11

## 2024-09-11 RX ADMIN — FAMOTIDINE 20 MG: 20 TABLET ORAL at 21:22

## 2024-09-11 RX ADMIN — PIPERACILLIN AND TAZOBACTAM 3.38 G: 3; .375 INJECTION, POWDER, FOR SOLUTION INTRAVENOUS at 21:22

## 2024-09-11 RX ADMIN — ISOSORBIDE MONONITRATE 60 MG: 60 TABLET, EXTENDED RELEASE ORAL at 09:16

## 2024-09-11 RX ADMIN — FAMOTIDINE 20 MG: 20 TABLET ORAL at 09:17

## 2024-09-11 RX ADMIN — QUETIAPINE FUMARATE 25 MG: 25 TABLET ORAL at 00:32

## 2024-09-11 RX ADMIN — PIPERACILLIN AND TAZOBACTAM 3.38 G: 3; .375 INJECTION, POWDER, FOR SOLUTION INTRAVENOUS at 02:07

## 2024-09-11 RX ADMIN — AMLODIPINE BESYLATE 2.5 MG: 2.5 TABLET ORAL at 09:17

## 2024-09-11 RX ADMIN — ROSUVASTATIN 10 MG: 10 TABLET, FILM COATED ORAL at 09:17

## 2024-09-11 RX ADMIN — METOPROLOL SUCCINATE 25 MG: 25 TABLET, EXTENDED RELEASE ORAL at 09:17

## 2024-09-11 RX ADMIN — PIPERACILLIN AND TAZOBACTAM 3.38 G: 3; .375 INJECTION, POWDER, FOR SOLUTION INTRAVENOUS at 09:19

## 2024-09-11 RX ADMIN — PIPERACILLIN AND TAZOBACTAM 3.38 G: 3; .375 INJECTION, POWDER, FOR SOLUTION INTRAVENOUS at 14:38

## 2024-09-11 ASSESSMENT — ACTIVITIES OF DAILY LIVING (ADL)
ADLS_ACUITY_SCORE: 30
ADLS_ACUITY_SCORE: 28
ADLS_ACUITY_SCORE: 30
ADLS_ACUITY_SCORE: 31
ADLS_ACUITY_SCORE: 30
ADLS_ACUITY_SCORE: 31
ADLS_ACUITY_SCORE: 30
ADLS_ACUITY_SCORE: 31
DEPENDENT_IADLS:: INDEPENDENT
ADLS_ACUITY_SCORE: 30
ADLS_ACUITY_SCORE: 30
ADLS_ACUITY_SCORE: 32
ADLS_ACUITY_SCORE: 28
ADLS_ACUITY_SCORE: 30
ADLS_ACUITY_SCORE: 28
ADLS_ACUITY_SCORE: 31
ADLS_ACUITY_SCORE: 30
ADLS_ACUITY_SCORE: 31
ADLS_ACUITY_SCORE: 30
ADLS_ACUITY_SCORE: 32
ADLS_ACUITY_SCORE: 30

## 2024-09-11 NOTE — PROGRESS NOTES
Patient very anxious and confused.  Known to me from admission.  He is being worked up for cognitive impairment as an outpatient.  He took off his finger splint.  -Ordered quetiapine 25 mg x 1

## 2024-09-11 NOTE — PROGRESS NOTES
09/11/24 0800   Appointment Info   Signing Clinician's Name / Credentials (PT) Talya Brush PT   Rehab Comments (PT) Podiatry note- R heel WB (possible R foot surgery 9/12)   Living Environment   People in Home alone   Current Living Arrangements house   Home Accessibility stairs to enter home   Number of Stairs, Main Entrance 2   Stair Railings, Main Entrance none   Transportation Anticipated car, drives self;family or friend will provide   Living Environment Comments Pt lives alone in a rambler with all needs met on main level (doesn't use basement)   Self-Care   Usual Activity Tolerance good   Current Activity Tolerance moderate   Equipment Currently Used at Home grab bar, toilet;grab bar, tub/shower   Fall history within last six months yes   Number of times patient has fallen within last six months 1   Activity/Exercise/Self-Care Comment Pt reports ambulating without a device.  Pt was independent with all ADLs.  Pt drove to grocery store and made own meals.  Pt does own cleaning and laundry.  Granddaughter mows lawn and pt has a snow plow service.   General Information   Onset of Illness/Injury or Date of Surgery 09/09/24   Referring Physician Dr. Tyrone Hernández   Patient/Family Therapy Goals Statement (PT) Pt agreeable to TCU   Pertinent History of Current Problem (include personal factors and/or comorbidities that impact the POC) Per medical chart: Gregory Zhong is a 83 year old male with PMH including CKD stage IIIb, A-fib/flutter s/p ablation chronically on warfarin, HTN, HLD, neuropathy, prostate cancer, bilateral renal masses, GERD, and new or cognitive impairment who presents with his son for evaluation after a fall. Pt sustained L 3rd finger dislocation, placed in splint.  Pt also dx with R LE cellulitis and R plantar foot ulcer (? osteomyleitis)   Existing Precautions/Restrictions fall   Weight-Bearing Status - RLE other (see comments)   General Observations Pt sitting on EOB with nurse   Cognition    Orientation Status (Cognition) oriented x 4   Follows Commands (Cognition) follows one-step commands;75-90% accuracy   Safety Deficit (Cognition) at risk behavior observed;awareness of need for assistance;insight into deficits/self-awareness;moderate deficit   Memory Deficit (Cognition) minimal deficit;short-term memory;immediate recall   Cognitive Status Comments Pt oriented X 4 but delayed responses   Pain Assessment   Patient Currently in Pain No   Integumentary/Edema   Integumentary/Edema Comments R foot ulcers, 1st metatarsal region with possible surgery tomorrow.  Abrasion to L knee and bridge of nose, R lower leg red, swollen   Posture    Posture Forward head position;Protracted shoulders   Range of Motion (ROM)   ROM Comment B UEs WNL except for L 3rd digit in splint , decreased R ankle ROM due to edema/cellulitis   Strength (Manual Muscle Testing)   Strength (Manual Muscle Testing) Deficits observed during functional mobility   Strength Comments B UEs grossly 5/5, B LEs atleast 3+/5 - observed with mobility   Bed Mobility   Comment, (Bed Mobility) Pt completed with nsg - ? level of A   Transfers   Comment, (Transfers) sit > stand from to FWW with CGA   Gait/Stairs (Locomotion)   Comment, (Gait/Stairs) Pt amb 15' with FWW and CGA, R heel WB   Balance   Balance Comments Impaired dynamic standing balance requiring B UE support  on FWW and CGA at trunk for safe mobility   Sensory Examination   Sensory Perception Comments pt with peripheral neuropathy B feet   Clinical Impression   Criteria for Skilled Therapeutic Intervention Yes, treatment indicated   PT Diagnosis (PT) Impaired functional mobility   Influenced by the following impairments R foot cellulitis with R heel WB status, impaired cognition, impaired balance, decreased strength   Functional limitations due to impairments impaired gait, increased falls risk, assisted mobility and ADLs   Clinical Presentation (PT Evaluation Complexity) evolving    Clinical Presentation Rationale R foot ulcer, multiple comorbidities   Clinical Decision Making (Complexity) low complexity   Planned Therapy Interventions (PT) balance training;bed mobility training;cryotherapy;gait training;home exercise program;patient/family education;ROM (range of motion);stair training;strengthening;transfer training;progressive activity/exercise;risk factor education;home program guidelines   Risk & Benefits of therapy have been explained evaluation/treatment results reviewed;care plan/treatment goals reviewed;risks/benefits reviewed;current/potential barriers reviewed;participants voiced agreement with care plan;participants included;patient;son   PT Total Evaluation Time   PT Eval, Low Complexity Minutes (74174) 10   Physical Therapy Goals   PT Frequency 5x/week   PT Predicted Duration/Target Date for Goal Attainment 09/14/24   PT Goals Bed Mobility;Transfers;Gait;Stairs   PT: Bed Mobility Independent;Supine to/from sit   PT: Transfers Modified independent;Sit to/from stand;Assistive device;Within precautions  (per WB restrictions following ? surgery 9/12)   PT: Gait Modified independent;Assistive device;Within precautions  (per WB restrictions following ? surgery 9/12)   PT: Stairs Minimal assist;2 stairs;Within precautions;Assistive device  (per WB restrictions following ? surgery 9/12)   PT Discharge Planning   PT Plan check R foot WB orders, progress safety with gait   PT Discharge Recommendation (DC Rec) Transitional Care Facility   PT Rationale for DC Rec Pt below baseline with mobility with concerns for safety in his current environment.  Pt lives alone in a house with 2 VIPUL.  Pt currently at increased risk for falls due to R foot cellulitis and WB restrictions with impaired cognition and safety awareness.  Recommend TCU to increase strength and balance and progress safety and independence with all mobility and ADLs.  Pt would benefit from a more supportive living environment such  as an retirement with nusing cares to monitor cellulitis.   PT Brief overview of current status A x 1 FWW, R heel WB   PT Equipment Needed at Discharge walker, rolling  (pt reports having one from  wife - ? if tall enough)

## 2024-09-11 NOTE — PLAN OF CARE
"/76  Pulse 72   Temp 99.2  F  Resp 16  SpO2 97%     When patient came on the unit he was alert and oriented x4, no confusion noted. Within a couple of hours patient got very anxious, started pulling his IV and splint on right finger. Lots of redirection but once nurse left ended up pulling off his splint and IV started to leak. New IV was placed by house resource RN, currently on vanco and zosyn, prn order for Seroquel 25mg was given and patient was able to calm down and fall asleep.       Goal Outcome Evaluation:      Plan of Care Reviewed With: patient    Overall Patient Progress: improvingOverall Patient Progress: improving    Outcome Evaluation: Patient is alert and oriented x4, but has confusion intermittently, no complaints of pain.      Problem: Adult Inpatient Plan of Care  Goal: Plan of Care Review  Description: The Plan of Care Review/Shift note should be completed every shift.  The Outcome Evaluation is a brief statement about your assessment that the patient is improving, declining, or no change.  This information will be displayed automatically on your shift  note.  Outcome: Progressing  Flowsheets (Taken 9/11/2024 6701)  Outcome Evaluation: Patient is alert and oriented x4, but has confusion intermittently, no complaints of pain.  Plan of Care Reviewed With: patient  Overall Patient Progress: improving  Goal: Patient-Specific Goal (Individualized)  Description: You can add care plan individualizations to a care plan. Examples of Individualization might be:  \"Parent requests to be called daily at 9am for status\", \"I have a hard time hearing out of my right ear\", or \"Do not touch me to wake me up as it startles  me\".  Outcome: Progressing  Goal: Absence of Hospital-Acquired Illness or Injury  Outcome: Progressing  Intervention: Identify and Manage Fall Risk  Recent Flowsheet Documentation  Taken 9/10/2024 2146 by Ernestine Hernandez, DIANA  Safety Promotion/Fall Prevention:   activity supervised   " clutter free environment maintained   lighting adjusted   mobility aid in reach   nonskid shoes/slippers when out of bed   patient and family education   room near nurse's station   room organization consistent   safety round/check completed   supervised activity  Intervention: Prevent Skin Injury  Recent Flowsheet Documentation  Taken 9/10/2024 2146 by Ernestine Hernandez, RN  Body Position: position changed independently  Intervention: Prevent Infection  Recent Flowsheet Documentation  Taken 9/10/2024 2146 by Ernestine Hernandez, RN  Infection Prevention:   cohorting utilized   hand hygiene promoted   rest/sleep promoted   single patient room provided  Goal: Optimal Comfort and Wellbeing  Outcome: Progressing  Goal: Readiness for Transition of Care  Outcome: Progressing  Intervention: Mutually Develop Transition Plan  Recent Flowsheet Documentation  Taken 9/10/2024 2201 by Ernestine Hernandez, RN  Equipment Currently Used at Home:   grab bar, toilet   grab bar, tub/shower

## 2024-09-11 NOTE — PLAN OF CARE
"Pt A&O x4-forgetful @ times. Denies pain. Dressing CDI. Up w/ Ax1, using gait belt, and walker. HWB to RLE. Voiding. Tolerating regular diet. Plan is to be NPO @ midnight for surgery tomorrow.     Problem: Infection  Goal: Absence of Infection Signs and Symptoms  Outcome: Not Progressing     Problem: Adult Inpatient Plan of Care  Goal: Plan of Care Review  Description: The Plan of Care Review/Shift note should be completed every shift.  The Outcome Evaluation is a brief statement about your assessment that the patient is improving, declining, or no change.  This information will be displayed automatically on your shift  note.  Outcome: Progressing  Flowsheets (Taken 9/11/2024 1820)  Plan of Care Reviewed With:   patient   family  Overall Patient Progress: improving  Goal: Patient-Specific Goal (Individualized)  Description: You can add care plan individualizations to a care plan. Examples of Individualization might be:  \"Parent requests to be called daily at 9am for status\", \"I have a hard time hearing out of my right ear\", or \"Do not touch me to wake me up as it startles  me\".  Outcome: Progressing  Goal: Absence of Hospital-Acquired Illness or Injury  Outcome: Progressing  Intervention: Prevent Infection  Recent Flowsheet Documentation  Taken 9/11/2024 1521 by Maru Lan RN  Infection Prevention: rest/sleep promoted  Goal: Optimal Comfort and Wellbeing  Outcome: Progressing  Goal: Readiness for Transition of Care  Outcome: Progressing     Problem: Fall Injury Risk  Goal: Absence of Fall and Fall-Related Injury  Outcome: Progressing   Goal Outcome Evaluation:      Plan of Care Reviewed With: patient, family    Overall Patient Progress: improvingOverall Patient Progress: improving           "

## 2024-09-11 NOTE — CONSULTS
Care Management Initial Consult    General Information  Assessment completed with: Patient, Children, Gregory and son Lane  Type of CM/SW Visit: Initial Assessment    Primary Care Provider verified and updated as needed: Yes   Readmission within the last 30 days: no previous admission in last 30 days      Reason for Consult: discharge planning  Advance Care Planning: Advance Care Planning Reviewed: no concerns identified       Communication Assessment  Patient's communication style: spoken language (English or Bilingual)    Hearing Difficulty or Deaf: no   Wear Glasses or Blind: yes    Cognitive  Cognitive/Neuro/Behavioral: .WDL except, orientation  Level of Consciousness: alert, intermittent confusion  Arousal Level: opens eyes spontaneously  Orientation: oriented x 4  Mood/Behavior: calm, cooperative  Best Language: 0 - No aphasia  Speech: clear, spontaneous    Living Environment:   People in home: alone     Current living Arrangements: house      Able to return to prior arrangements: yes    Family/Social Support:  Care provided by: self, child(mango)  Provides care for: no one  Marital Status:   Support system: Children          Description of Support System: Supportive, Involved    Support Assessment: Adequate family and caregiver support, Adequate social supports    Current Resources:   Patient receiving home care services: No  Community Resources: None  Equipment currently used at home: grab bar, toilet, grab bar, tub/shower  Supplies currently used at home: None    Lifestyle & Psychosocial Needs:  Social Determinants of Health     Food Insecurity: Low Risk  (9/10/2024)    Food Insecurity     Within the past 12 months, did you worry that your food would run out before you got money to buy more?: No     Within the past 12 months, did the food you bought just not last and you didn t have money to get more?: No   Depression: Not at risk (8/11/2023)    Received from [facility]    PHQ-2     PHQ-2 Score: 0    Housing Stability: Low Risk  (9/10/2024)    Housing Stability     Do you have housing? : Yes     Are you worried about losing your housing?: No   Tobacco Use: Unknown (9/9/2024)    Patient History     Smoking Tobacco Use: Never     Smokeless Tobacco Use: Unknown     Passive Exposure: Not on file   Financial Resource Strain: Low Risk  (9/10/2024)    Financial Resource Strain     Within the past 12 months, have you or your family members you live with been unable to get utilities (heat, electricity) when it was really needed?: No   Alcohol Use: Not on file   Transportation Needs: Low Risk  (9/10/2024)    Transportation Needs     Within the past 12 months, has lack of transportation kept you from medical appointments, getting your medicines, non-medical meetings or appointments, work, or from getting things that you need?: No   Physical Activity: Not on file   Interpersonal Safety: Low Risk  (9/10/2024)    Interpersonal Safety     Do you feel physically and emotionally safe where you currently live?: Yes     Within the past 12 months, have you been hit, slapped, kicked or otherwise physically hurt by someone?: No     Within the past 12 months, have you been humiliated or emotionally abused in other ways by your partner or ex-partner?: No   Stress: Not on file   Social Connections: Not on file   Health Literacy: Not on file       Functional Status:  Prior to admission patient needed assistance:   Dependent ADLs:: Independent  Dependent IADLs:: Independent        Discussed  Partnership in Safe Discharge Planning  document with patient/family: Yes      Additional Information:  Patient was admitted on 9/10/24 after a fall in his driveway. Dx: Cellulitis of right foot, Left long finger PIP joint dislocation status post reduction and splinting. Care management consulted to assist with discharge planning.     Met with patient and his son Lane at bedside. PT is recommending patient discharge to TCU at time of discharge for  additional therapies and strengthening. Patient has planned surgery tomorrow 9/12 and hospitalist anticipates patient should be discharge ready by this weekend. Discussed therapy's recommendation with patient and son, they are in agreement with TCU placement. SNF packet provided to patient/family and education provided on how to navigate Medicare.gov to research facilities and their star ratings.    Patient lives home alone and, sobeida Sherman was already in the process of getting pt set up at [facility]. They would like to discharge to [facility] if possible. Sobeida Sherman has already been in contact with facility so they can anticipate referral coming through. Referral has been sent to [facility]. Called and left voicemail with admissions dept with information above and to call back to discuss. Patient will not be ready for discharge still for a few days.       Next Steps: Follow up with [facility] on status of referral. Coordinate transportation. BCBS auth and PAS will need to be completed closer to discharge. Hospitalist updated writer that patient may be ready for discharge as soon as this weekend 9/14 or 9/15.       Renetta More RN  Care Coordinator  [facility]  [phone]

## 2024-09-11 NOTE — PROGRESS NOTES
Patient is very anxious, confused, took off his splint because he thought it was his watch and pulling on his IV which started to leak and a new one needed to be placed. Paged cross cover for prn medications. Patient unsteady, reminding him to call us before getting up but patient gets up on his own. All fall precautions in place, mats were placed on the sides of his bed, grippy socks are on but he's been taking them off, brought walker into the room so he can use it as well.

## 2024-09-11 NOTE — PROGRESS NOTES
[facility]    Medicine Progress Note - Hospitalist Service    Date of Admission:  9/9/2024    Assessment & Plan     Gregory Zhong is a 83 year old male with PMH including CKD stage IIIb, A-fib/flutter s/p ablation chronically on warfarin, HTN, HLD, neuropathy, prostate cancer, bilateral renal masses, GERD, and new or cognitive impairment who presents with his son for evaluation after a fall.  The patient lives alone in a house, but his son lives about a mile away.  There have been concerns for cognitive impairment and he is due to see a neurologist in clinic for further workup.  The patient was moving his yard waste up into the curb when he says he slipped and fell.  He does not think he lost consciousness.  He denies hitting his head, but he has facial abrasions.  He denies any headache.  He did have pain in his left third finger.  Denies any recent falls, but his son says that he has appeared to have some difficulty walking with the right leg.  The patient denies any fevers, chills, nausea, vomiting, cough.  Found to have left third finger dislocation and right lower extremity cellulitis with right foot osteomyelitis.    Right lower extremity cellulitis.  Right foot ulcer.  Right foot sesamoid osteomyelitis.  Sepsis due to cellulitis and osteomyelitis.  -Initially started on IV vancomycin and Zosyn.  -MRSA PCR negative.  -Stop IV vancomycin.  -Continue IV Zosyn.  -Podiatry following.  -Plan to take to operating room on 9/12.    Left third finger dislocation.  -Appreciate orthopedic surgery input.  -Pain medications as needed.  -Recommended to wear finger splint.    Drug-induced coagulation defect.  Supratherapeutic INR.  -INR initially 3.8.  -Improved to 2.4 today.  -Continue to hold warfarin.  -Recheck INR tomorrow morning.    Chronic atrial fibrillation.  Hypertension.  Hyperlipidemia.  -Hold warfarin as above.  -Continue metoprolol succinate 25 mg a day.  -Continue amlodipine 2.5 mg a  "day.  -Continue isosorbide mononitrate 60 mg a day.  -Continue rosuvastatin 10 mg a day.    Cognitive impairment.  -Suspect chronic.  -Occupational Therapy consult.    GERD.  -Continue famotidine 20 mg twice a day.    Chronic kidney disease stage IIIb.  -Creatinine appears to be near baseline.  -Avoid nephrotoxins as able.  -Recheck metabolic panel intermittently.    Acute on chronic hyperkalemia.  -Recheck metabolic panel intermittently.  -Lokelma if needed.    History of prostate cancer.  History of bilateral renal mass.  -Status post previous bilateral renal mass ablation.            Diet: Regular Diet Adult    DVT Prophylaxis: Warfarin  Galloway Catheter: Not present  Lines: None     Cardiac Monitoring: None  Code Status: Full Code      Clinically Significant Risk Factors        # Hyperkalemia: Highest K = 6.1 mmol/L in last 2 days, will monitor as appropriate    # Hypercalcemia: Highest Ca = 10.6 mg/dL in last 2 days, will monitor as appropriate     # Coagulation Defect: INR = 2.46 (Ref range: 0.85 - 1.15) and/or PTT = N/A, will monitor for bleeding              # Overweight: Estimated body mass index is 27.1 kg/m  as calculated from the following:    Height as of this encounter: 1.905 m (6' 3\").    Weight as of this encounter: 98.3 kg (216 lb 12.8 oz)., PRESENT ON ADMISSION                  Disposition Plan     Medically Ready for Discharge: Anticipated in 2-4 Days             Patricio Cole DO  Hospitalist Service  [facility]  Securely message with [employer] (more info)  Text page via [employer] Paging/Directory   ______________________________________________________________________    Interval History   Denies chest pain, shortness of breath, fevers, chills, nausea, vomiting.    Physical Exam   Vital Signs: Temp: 98.2  F (36.8  C) Temp src: Temporal BP: 121/70 Pulse: 77   Resp: 16 SpO2: 95 % O2 Device: None (Room air)    Weight: 216 lbs 12.8 oz    Gen:  NAD, A&Ox2 to person and place.  Not " oriented to time.  Eyes:  PERRL, sclera anicteric.  OP:  MMM, no lesions.  Neck:  Supple.  CV:  Irregular, no loud murmurs.  Lung:  CTA b/l, normal effort.  Ab:  +BS, soft.  Skin:  Warm, dry to touch.  Erythema right lower extremity.  Ext:  1+ pitting edema LE b/l.      Medical Decision Making       46 MINUTES SPENT BY ME on the date of service doing chart review, history, exam, documentation & further activities per the note.      Data     I have personally reviewed the following data over the past 24 hrs:    19.5 (H)  \   11.1 (L)   / 183     138 109 (H) 31.6 (H) /  97   4.5 20 (L) 1.53 (H) \     INR:  2.46 (H) PTT:  N/A   D-dimer:  N/A Fibrinogen:  N/A       Imaging results reviewed over the past 24 hrs:   Recent Results (from the past 24 hour(s))   US DAVID Doppler No Exercise    Narrative    IR DAVID US DAVID DOPPLER NO EXERCISE, 1-2 LEVELS, BILAT   9/10/2024 3:12  PM     HISTORY: Right foot ulcer, evaluate for PAD    COMPARISON: None.    FINDINGS:  Right DAVID:   DP: 0.99  PT: 0.97.    Left DAVID:   DP: 1.01   PT: 1.10.    Waveforms: Triphasic in the distal tibial arteries      Impression    IMPRESSION: Resting ankle-brachial indices are within normal limits.    DAVID CRITERIA:  >0.95 Normal  0.90 - 0.94 Mild  0.5 - 0.89 Moderate  0.2 - 0.49 Severe  <0.2 Critical    GIL CELESTE MD         SYSTEM ID:  [id number]

## 2024-09-11 NOTE — PROGRESS NOTES
[facility]  PROGRESS NOTE    CHIEF COMPLAINT:      I was asked by Tyrone Hernández MD  to evaluate this patient for right foot.    PATIENT HISTORY:  Gregory Zhong is a 83 year old male seen for follow up for his right foot. His son and friend are at bedside. He states he feels well. No significant pain to the right foot.          Review of Systems:  A 10 point review of systems was performed and is positive for that noted above in the patient history.  All other areas are negative.     PAST MEDICAL HISTORY:   Past Medical History:   Diagnosis Date    Atrial flutter (H)     Bladder cancer (H)     Chronic atrial fibrillation (H)     Chronic kidney disease, stage III (moderate) (H)     Dyslipidemia     Gastroesophageal reflux disease     History of basal cell carcinoma     HLD (hyperlipidemia)     Hyperparathyroidism (H24)     Hypertension     Mild cognitive impairment     Prostate cancer (H)     Tremor         PAST SURGICAL HISTORY:   Past Surgical History:   Procedure Laterality Date    APPENDECTOMY      Cryoablation of left and right renal masses      CYSTOSCOPY      Excision of basal cell carcinoma      MOHS MICROGRAPHIC PROCEDURE      TONSILLECTOMY          MEDICATIONS:  Reviewed in Epic. Current.     ALLERGIES:  No Known Allergies     SOCIAL HISTORY:   Social History     Socioeconomic History    Marital status:      Spouse name: Not on file    Number of children: Not on file    Years of education: Not on file    Highest education level: Not on file   Occupational History    Not on file   Tobacco Use    Smoking status: Never    Smokeless tobacco: Not on file   Substance and Sexual Activity    Alcohol use: Not on file    Drug use: Not on file    Sexual activity: Not on file   Other Topics Concern    Not on file   Social History Narrative    Not on file     Social Determinants of Health     Financial Resource Strain: Low Risk  (9/10/2024)    Financial Resource Strain      "Within the past 12 months, have you or your family members you live with been unable to get utilities (heat, electricity) when it was really needed?: No   Food Insecurity: Low Risk  (9/10/2024)    Food Insecurity     Within the past 12 months, did you worry that your food would run out before you got money to buy more?: No     Within the past 12 months, did the food you bought just not last and you didn t have money to get more?: No   Transportation Needs: Low Risk  (9/10/2024)    Transportation Needs     Within the past 12 months, has lack of transportation kept you from medical appointments, getting your medicines, non-medical meetings or appointments, work, or from getting things that you need?: No   Physical Activity: Not on file   Stress: Not on file   Social Connections: Not on file   Interpersonal Safety: Low Risk  (9/10/2024)    Interpersonal Safety     Do you feel physically and emotionally safe where you currently live?: Yes     Within the past 12 months, have you been hit, slapped, kicked or otherwise physically hurt by someone?: No     Within the past 12 months, have you been humiliated or emotionally abused in other ways by your partner or ex-partner?: No   Housing Stability: Low Risk  (9/10/2024)    Housing Stability     Do you have housing? : Yes     Are you worried about losing your housing?: No        FAMILY HISTORY: No family history on file.     EXAM:Vitals: /63 (BP Location: Left arm)   Pulse 55   Temp 98.2  F (36.8  C) (Temporal)   Resp 16   Ht 1.905 m (6' 3\")   Wt 98.3 kg (216 lb 12.8 oz)   SpO2 95%   BMI 27.10 kg/m    BMI= Body mass index is 27.1 kg/m .    LABS:   .a1c  Last Comprehensive Metabolic Panel:  Sodium   Date Value Ref Range Status   09/11/2024 138 135 - 145 mmol/L Final     Potassium   Date Value Ref Range Status   09/11/2024 4.5 3.4 - 5.3 mmol/L Final     Chloride   Date Value Ref Range Status   09/11/2024 109 (H) 98 - 107 mmol/L Final     Carbon Dioxide (CO2)   Date " Value Ref Range Status   09/11/2024 20 (L) 22 - 29 mmol/L Final     Anion Gap   Date Value Ref Range Status   09/11/2024 9 7 - 15 mmol/L Final     Glucose   Date Value Ref Range Status   09/11/2024 97 70 - 99 mg/dL Final     Urea Nitrogen   Date Value Ref Range Status   09/11/2024 31.6 (H) 8.0 - 23.0 mg/dL Final     Creatinine   Date Value Ref Range Status   09/11/2024 1.53 (H) 0.67 - 1.17 mg/dL Final     GFR Estimate   Date Value Ref Range Status   09/11/2024 45 (L) >60 mL/min/1.73m2 Final     Comment:     eGFR calculated using 2021 CKD-EPI equation.     Calcium   Date Value Ref Range Status   09/11/2024 9.5 8.8 - 10.4 mg/dL Final     Comment:     Reference intervals for this test were updated on 7/16/2024 to reflect our healthy population more accurately. There may be differences in the flagging of prior results with similar values performed with this method. Those prior results can be interpreted in the context of the updated reference intervals.     Lab Results   Component Value Date    WBC 20.2 09/10/2024     Lab Results   Component Value Date    RBC 4.00 09/10/2024     Lab Results   Component Value Date    HGB 11.8 09/10/2024     Lab Results   Component Value Date    HCT 36.3 09/10/2024     Lab Results   Component Value Date     09/10/2024      Lab Results   Component Value Date    INR 3.70 09/10/2024    INR 3.98 09/09/2024        General appearance: Patient is alert and fully cooperative with history & exam.  No sign of distress is noted during the visit.      Respiratory: Breathing is regular & unlabored while sitting.      HEENT: Hearing is intact to spoken word.  Speech is clear.  No gross evidence of visual impairment that would impact ambulation.      Dermatologic: Right foot submet one ulcer with necrotic tissue, probes close to bone. Cellulitis to hallux, second digit and midfoot. Third digit with small, dry callus. Non draining. No cellulitis to third toe.      Vascular: Dorsalis pedis and  posterior tibial pulses are weakly palpable.      Neurologic: Lower extremity sensation is diminished, bilateral foot, to light touch.  No evidence of neurological-based weakness or contracture in the lower extremities.       Musculoskeletal: Patient is ambulatory without an assistive device or brace.  No gross foot or ankle deformity noted.       Psychiatric: Affect is pleasant & appropriate.      All cultures:  Recent Labs   Lab 09/09/24  [id number]   CULTURE No growth after 1 day        IMAGING:     IMPRESSION:  1. Ulcerative lesion along the plantar aspect of the distal first  metatarsal. There is low T1 signal, with increased T2 signal in the  adjacent sesamoids, tibial greater than fibular, MRI findings most  consistent with osteomyelitis. There is subtle increased bone marrow  edema in the great toe proximal phalanx without low T1 signal,  presumed reactive osteitis.     2. Low T1 signal with increased T2 signal in the distal aspect of the  third toe distal phalanx, MRI findings most consistent with  osteomyelitis, this is adjacent to a soft tissue defect, sagittal  series 4 image 9.     3. Diffuse soft tissue edema within the musculature, likely  representing myositis. No discrete abscess although contrast was not  administered.     4. Diffuse subcutaneous soft tissue edema along dorsal aspect of the  foot, findings most consistent with cellulitis.    ABIs  FINDINGS:  Right DAVID:   DP: 0.99  PT: 0.97.     Left DAVID:   DP: 1.01   PT: 1.10.     Waveforms: Triphasic in the distal tibial arteries                                                                   IMPRESSION: Resting ankle-brachial indices are within normal limits.      ASSESSMENT:  Right foot submet one ulcer, osteomyelitis to sesmaoids.   Right foot third digit, preulcerative lesion  Supra therapeutic INR, > 3.8 on admission     MEDICAL DECISION MAKING:   -Discussed all findings with patient. Chart and imaging reviewed.     -Further conversation had  regarding surgical planning. Recommendation made for partial first ray resection and partial third digit amputation. Patient and the son agree this is best for full infection control and expedited healing.   -Reviewed vascular studies, blood flow appropriate for surgery.     -Will schedule for tomorrow. NPO after midnight.     -INR: 2.46 this morning. Goal INR approx 1.7 for surgery. Defer to the hospitalist service, possible need for reversal agent. Can start heparin if needed for patient's atrial fibrillation.     -Orders placed for surgery for 9/12.       Nedra Workman DPM   [place] [facility]  [phone]

## 2024-09-11 NOTE — PLAN OF CARE
"A&O x 4 during the day. Vital signs stable and denies pain. Numbness to feet. Wound dressing to R foot changed by STEWART lozano. Ambulating assist by 1 walker and gait belt. Tolerating diet. Plan for surgery tomorrow.         Problem: Infection  Goal: Absence of Infection Signs and Symptoms  Outcome: Not Progressing     Problem: Adult Inpatient Plan of Care  Goal: Plan of Care Review  Description: The Plan of Care Review/Shift note should be completed every shift.  The Outcome Evaluation is a brief statement about your assessment that the patient is improving, declining, or no change.  This information will be displayed automatically on your shift  note.  Outcome: Progressing  Flowsheets (Taken 9/11/2024 1449)  Plan of Care Reviewed With:   patient   child  Overall Patient Progress: improving  Goal: Patient-Specific Goal (Individualized)  Description: You can add care plan individualizations to a care plan. Examples of Individualization might be:  \"Parent requests to be called daily at 9am for status\", \"I have a hard time hearing out of my right ear\", or \"Do not touch me to wake me up as it startles  me\".  Outcome: Progressing  Goal: Absence of Hospital-Acquired Illness or Injury  Outcome: Progressing  Intervention: Identify and Manage Fall Risk  Recent Flowsheet Documentation  Taken 9/11/2024 0917 by Sulema Jauregui RN  Safety Promotion/Fall Prevention: safety round/check completed  Intervention: Prevent Infection  Recent Flowsheet Documentation  Taken 9/11/2024 0917 by Sulema Jauregui, RN  Infection Prevention:   single patient room provided   hand hygiene promoted  Goal: Optimal Comfort and Wellbeing  Outcome: Progressing  Goal: Readiness for Transition of Care  Outcome: Progressing     Problem: Fall Injury Risk  Goal: Absence of Fall and Fall-Related Injury  Outcome: Progressing  Intervention: Promote Injury-Free Environment  Recent Flowsheet Documentation  Taken 9/11/2024 0917 by Sulema Jauregui, RN  Safety " Promotion/Fall Prevention: safety round/check completed   Goal Outcome Evaluation:      Plan of Care Reviewed With: patient, child    Overall Patient Progress: improvingOverall Patient Progress: improving

## 2024-09-12 ENCOUNTER — ANESTHESIA (OUTPATIENT)
Dept: SURGERY | Facility: CLINIC | Age: 84
DRG: 853 | End: 2024-09-12
Payer: COMMERCIAL

## 2024-09-12 ENCOUNTER — APPOINTMENT (OUTPATIENT)
Dept: GENERAL RADIOLOGY | Facility: CLINIC | Age: 84
DRG: 853 | End: 2024-09-12
Attending: PODIATRIST
Payer: COMMERCIAL

## 2024-09-12 ENCOUNTER — ANESTHESIA EVENT (OUTPATIENT)
Dept: SURGERY | Facility: CLINIC | Age: 84
DRG: 853 | End: 2024-09-12
Payer: COMMERCIAL

## 2024-09-12 LAB
ANION GAP SERPL CALCULATED.3IONS-SCNC: 9 MMOL/L (ref 7–15)
BACTERIA SPEC CULT: ABNORMAL
BACTERIA SPEC CULT: ABNORMAL
BUN SERPL-MCNC: 36 MG/DL (ref 8–23)
CALCIUM SERPL-MCNC: 9.5 MG/DL (ref 8.8–10.4)
CHLORIDE SERPL-SCNC: 111 MMOL/L (ref 98–107)
CREAT SERPL-MCNC: 1.68 MG/DL (ref 0.67–1.17)
CRP SERPL-MCNC: 72.41 MG/L
EGFRCR SERPLBLD CKD-EPI 2021: 40 ML/MIN/1.73M2
ENTEROCOCCUS FAECALIS: NOT DETECTED
ENTEROCOCCUS FAECIUM: NOT DETECTED
ERYTHROCYTE [DISTWIDTH] IN BLOOD BY AUTOMATED COUNT: 13.9 % (ref 10–15)
GLUCOSE BLDC GLUCOMTR-MCNC: 89 MG/DL (ref 70–99)
GLUCOSE SERPL-MCNC: 100 MG/DL (ref 70–99)
GRAM STAIN RESULT: ABNORMAL
HCO3 SERPL-SCNC: 19 MMOL/L (ref 22–29)
HCT VFR BLD AUTO: 34.1 % (ref 40–53)
HGB BLD-MCNC: 11.1 G/DL (ref 13.3–17.7)
INR PPP: 1.88 (ref 0.85–1.15)
LISTERIA SPECIES (DETECTED/NOT DETECTED): NOT DETECTED
MCH RBC QN AUTO: 29.3 PG (ref 26.5–33)
MCHC RBC AUTO-ENTMCNC: 32.6 G/DL (ref 31.5–36.5)
MCV RBC AUTO: 90 FL (ref 78–100)
PLATELET # BLD AUTO: 200 10E3/UL (ref 150–450)
POTASSIUM SERPL-SCNC: 4.2 MMOL/L (ref 3.4–5.3)
RBC # BLD AUTO: 3.79 10E6/UL (ref 4.4–5.9)
SODIUM SERPL-SCNC: 139 MMOL/L (ref 135–145)
STAPHYLOCOCCUS AUREUS: NOT DETECTED
STAPHYLOCOCCUS EPIDERMIDIS: NOT DETECTED
STAPHYLOCOCCUS LUGDUNENSIS: NOT DETECTED
STAPHYLOCOCCUS SPECIES: NOT DETECTED
STREPTOCOCCUS AGALACTIAE: NOT DETECTED
STREPTOCOCCUS ANGINOSUS GROUP: NOT DETECTED
STREPTOCOCCUS PNEUMONIAE: NOT DETECTED
STREPTOCOCCUS PYOGENES: NOT DETECTED
STREPTOCOCCUS SPECIES: NOT DETECTED
WBC # BLD AUTO: 18.2 10E3/UL (ref 4–11)

## 2024-09-12 PROCEDURE — 370N000017 HC ANESTHESIA TECHNICAL FEE, PER MIN: Performed by: PODIATRIST

## 2024-09-12 PROCEDURE — 28825 PARTIAL AMPUTATION OF TOE: CPT | Mod: 51 | Performed by: PODIATRIST

## 2024-09-12 PROCEDURE — 272N000001 HC OR GENERAL SUPPLY STERILE: Performed by: PODIATRIST

## 2024-09-12 PROCEDURE — 250N000013 HC RX MED GY IP 250 OP 250 PS 637: Performed by: PODIATRIST

## 2024-09-12 PROCEDURE — 87205 SMEAR GRAM STAIN: CPT | Performed by: PODIATRIST

## 2024-09-12 PROCEDURE — 11043 DBRDMT MUSC&/FSCA 1ST 20/<: CPT | Mod: 51 | Performed by: PODIATRIST

## 2024-09-12 PROCEDURE — 28122 PARTIAL REMOVAL OF FOOT BONE: CPT | Mod: RT | Performed by: PODIATRIST

## 2024-09-12 PROCEDURE — 250N000011 HC RX IP 250 OP 636: Performed by: PODIATRIST

## 2024-09-12 PROCEDURE — 250N000011 HC RX IP 250 OP 636: Performed by: NURSE ANESTHETIST, CERTIFIED REGISTERED

## 2024-09-12 PROCEDURE — 120N000001 HC R&B MED SURG/OB

## 2024-09-12 PROCEDURE — 85027 COMPLETE CBC AUTOMATED: CPT | Performed by: INTERNAL MEDICINE

## 2024-09-12 PROCEDURE — 85610 PROTHROMBIN TIME: CPT | Performed by: INTERNAL MEDICINE

## 2024-09-12 PROCEDURE — 0Y6M0Z9 DETACHMENT AT RIGHT FOOT, PARTIAL 1ST RAY, OPEN APPROACH: ICD-10-PCS | Performed by: PODIATRIST

## 2024-09-12 PROCEDURE — 88304 TISSUE EXAM BY PATHOLOGIST: CPT | Mod: TC | Performed by: PODIATRIST

## 2024-09-12 PROCEDURE — 88311 DECALCIFY TISSUE: CPT | Mod: TC | Performed by: PODIATRIST

## 2024-09-12 PROCEDURE — 86140 C-REACTIVE PROTEIN: CPT | Performed by: INTERNAL MEDICINE

## 2024-09-12 PROCEDURE — 250N000009 HC RX 250: Performed by: NURSE ANESTHETIST, CERTIFIED REGISTERED

## 2024-09-12 PROCEDURE — 99232 SBSQ HOSP IP/OBS MODERATE 35: CPT | Performed by: INTERNAL MEDICINE

## 2024-09-12 PROCEDURE — 258N000003 HC RX IP 258 OP 636: Performed by: NURSE ANESTHETIST, CERTIFIED REGISTERED

## 2024-09-12 PROCEDURE — 999N000141 HC STATISTIC PRE-PROCEDURE NURSING ASSESSMENT: Performed by: PODIATRIST

## 2024-09-12 PROCEDURE — 250N000011 HC RX IP 250 OP 636: Performed by: INTERNAL MEDICINE

## 2024-09-12 PROCEDURE — 88311 DECALCIFY TISSUE: CPT | Mod: 26 | Performed by: PATHOLOGY

## 2024-09-12 PROCEDURE — 88304 TISSUE EXAM BY PATHOLOGIST: CPT | Mod: 26 | Performed by: PATHOLOGY

## 2024-09-12 PROCEDURE — 88305 TISSUE EXAM BY PATHOLOGIST: CPT | Mod: 26 | Performed by: PATHOLOGY

## 2024-09-12 PROCEDURE — 360N000076 HC SURGERY LEVEL 3, PER MIN: Performed by: PODIATRIST

## 2024-09-12 PROCEDURE — 87075 CULTR BACTERIA EXCEPT BLOOD: CPT | Performed by: PODIATRIST

## 2024-09-12 PROCEDURE — 0Y6M0ZC DETACHMENT AT RIGHT FOOT, PARTIAL 3RD RAY, OPEN APPROACH: ICD-10-PCS | Performed by: PODIATRIST

## 2024-09-12 PROCEDURE — 250N000013 HC RX MED GY IP 250 OP 250 PS 637: Performed by: INTERNAL MEDICINE

## 2024-09-12 PROCEDURE — 80048 BASIC METABOLIC PNL TOTAL CA: CPT | Performed by: INTERNAL MEDICINE

## 2024-09-12 PROCEDURE — 87070 CULTURE OTHR SPECIMN AEROBIC: CPT | Performed by: PODIATRIST

## 2024-09-12 PROCEDURE — 999N000065 XR FOOT PORT RIGHT 2 VIEWS: Mod: RT

## 2024-09-12 PROCEDURE — 36415 COLL VENOUS BLD VENIPUNCTURE: CPT | Performed by: INTERNAL MEDICINE

## 2024-09-12 PROCEDURE — 710N000012 HC RECOVERY PHASE 2, PER MINUTE: Performed by: PODIATRIST

## 2024-09-12 PROCEDURE — L4361 PNEUMA/VAC WALK BOOT PRE OTS: HCPCS

## 2024-09-12 PROCEDURE — 0LBV0ZZ EXCISION OF RIGHT FOOT TENDON, OPEN APPROACH: ICD-10-PCS | Performed by: PODIATRIST

## 2024-09-12 RX ORDER — LIDOCAINE 40 MG/G
CREAM TOPICAL
Status: DISCONTINUED | OUTPATIENT
Start: 2024-09-12 | End: 2024-09-13

## 2024-09-12 RX ORDER — ONDANSETRON 2 MG/ML
4 INJECTION INTRAMUSCULAR; INTRAVENOUS EVERY 6 HOURS PRN
Status: DISCONTINUED | OUTPATIENT
Start: 2024-09-12 | End: 2024-09-12

## 2024-09-12 RX ORDER — BUPIVACAINE HYDROCHLORIDE 5 MG/ML
INJECTION, SOLUTION EPIDURAL; INTRACAUDAL PRN
Status: DISCONTINUED | OUTPATIENT
Start: 2024-09-12 | End: 2024-09-12 | Stop reason: HOSPADM

## 2024-09-12 RX ORDER — SODIUM CHLORIDE, SODIUM LACTATE, POTASSIUM CHLORIDE, CALCIUM CHLORIDE 600; 310; 30; 20 MG/100ML; MG/100ML; MG/100ML; MG/100ML
INJECTION, SOLUTION INTRAVENOUS CONTINUOUS
Status: DISCONTINUED | OUTPATIENT
Start: 2024-09-12 | End: 2024-09-12 | Stop reason: HOSPADM

## 2024-09-12 RX ORDER — HYDROMORPHONE HCL IN WATER/PF 6 MG/30 ML
0.2 PATIENT CONTROLLED ANALGESIA SYRINGE INTRAVENOUS EVERY 5 MIN PRN
Status: DISCONTINUED | OUTPATIENT
Start: 2024-09-12 | End: 2024-09-12 | Stop reason: HOSPADM

## 2024-09-12 RX ORDER — ONDANSETRON 2 MG/ML
4 INJECTION INTRAMUSCULAR; INTRAVENOUS EVERY 30 MIN PRN
Status: DISCONTINUED | OUTPATIENT
Start: 2024-09-12 | End: 2024-09-12 | Stop reason: HOSPADM

## 2024-09-12 RX ORDER — LIDOCAINE 40 MG/G
CREAM TOPICAL
Status: DISCONTINUED | OUTPATIENT
Start: 2024-09-12 | End: 2024-09-12 | Stop reason: HOSPADM

## 2024-09-12 RX ORDER — HYDROMORPHONE HCL IN WATER/PF 6 MG/30 ML
0.4 PATIENT CONTROLLED ANALGESIA SYRINGE INTRAVENOUS EVERY 5 MIN PRN
Status: DISCONTINUED | OUTPATIENT
Start: 2024-09-12 | End: 2024-09-12 | Stop reason: HOSPADM

## 2024-09-12 RX ORDER — FENTANYL CITRATE 50 UG/ML
25 INJECTION, SOLUTION INTRAMUSCULAR; INTRAVENOUS EVERY 5 MIN PRN
Status: DISCONTINUED | OUTPATIENT
Start: 2024-09-12 | End: 2024-09-12 | Stop reason: HOSPADM

## 2024-09-12 RX ORDER — ACETAMINOPHEN 325 MG/1
650 TABLET ORAL EVERY 4 HOURS PRN
Status: DISCONTINUED | OUTPATIENT
Start: 2024-09-15 | End: 2024-09-14

## 2024-09-12 RX ORDER — OXYCODONE HYDROCHLORIDE 5 MG/1
10 TABLET ORAL
Status: DISCONTINUED | OUTPATIENT
Start: 2024-09-12 | End: 2024-09-12 | Stop reason: HOSPADM

## 2024-09-12 RX ORDER — DEXAMETHASONE SODIUM PHOSPHATE 4 MG/ML
4 INJECTION, SOLUTION INTRA-ARTICULAR; INTRALESIONAL; INTRAMUSCULAR; INTRAVENOUS; SOFT TISSUE
Status: DISCONTINUED | OUTPATIENT
Start: 2024-09-12 | End: 2024-09-12 | Stop reason: HOSPADM

## 2024-09-12 RX ORDER — NALOXONE HYDROCHLORIDE 0.4 MG/ML
0.1 INJECTION, SOLUTION INTRAMUSCULAR; INTRAVENOUS; SUBCUTANEOUS
Status: DISCONTINUED | OUTPATIENT
Start: 2024-09-12 | End: 2024-09-12 | Stop reason: HOSPADM

## 2024-09-12 RX ORDER — SODIUM CHLORIDE, SODIUM LACTATE, POTASSIUM CHLORIDE, CALCIUM CHLORIDE 600; 310; 30; 20 MG/100ML; MG/100ML; MG/100ML; MG/100ML
INJECTION, SOLUTION INTRAVENOUS CONTINUOUS PRN
Status: DISCONTINUED | OUTPATIENT
Start: 2024-09-12 | End: 2024-09-12

## 2024-09-12 RX ORDER — AMOXICILLIN 250 MG
1 CAPSULE ORAL 2 TIMES DAILY
Status: DISCONTINUED | OUTPATIENT
Start: 2024-09-12 | End: 2024-09-14

## 2024-09-12 RX ORDER — ACETAMINOPHEN 325 MG/1
975 TABLET ORAL ONCE
Status: DISCONTINUED | OUTPATIENT
Start: 2024-09-12 | End: 2024-09-12 | Stop reason: HOSPADM

## 2024-09-12 RX ORDER — LIDOCAINE HYDROCHLORIDE 20 MG/ML
INJECTION, SOLUTION INFILTRATION; PERINEURAL PRN
Status: DISCONTINUED | OUTPATIENT
Start: 2024-09-12 | End: 2024-09-12

## 2024-09-12 RX ORDER — BISACODYL 10 MG
10 SUPPOSITORY, RECTAL RECTAL DAILY PRN
Status: DISCONTINUED | OUTPATIENT
Start: 2024-09-15 | End: 2024-09-14

## 2024-09-12 RX ORDER — ACETAMINOPHEN 325 MG/1
975 TABLET ORAL EVERY 8 HOURS
Status: DISCONTINUED | OUTPATIENT
Start: 2024-09-12 | End: 2024-09-14

## 2024-09-12 RX ORDER — ONDANSETRON 4 MG/1
4 TABLET, ORALLY DISINTEGRATING ORAL EVERY 30 MIN PRN
Status: DISCONTINUED | OUTPATIENT
Start: 2024-09-12 | End: 2024-09-12 | Stop reason: HOSPADM

## 2024-09-12 RX ORDER — PROPOFOL 10 MG/ML
INJECTION, EMULSION INTRAVENOUS CONTINUOUS PRN
Status: DISCONTINUED | OUTPATIENT
Start: 2024-09-12 | End: 2024-09-12

## 2024-09-12 RX ORDER — LABETALOL HYDROCHLORIDE 5 MG/ML
10 INJECTION, SOLUTION INTRAVENOUS EVERY 5 MIN PRN
Status: DISCONTINUED | OUTPATIENT
Start: 2024-09-12 | End: 2024-09-12 | Stop reason: HOSPADM

## 2024-09-12 RX ORDER — OXYCODONE HYDROCHLORIDE 5 MG/1
5 TABLET ORAL
Status: DISCONTINUED | OUTPATIENT
Start: 2024-09-12 | End: 2024-09-12 | Stop reason: HOSPADM

## 2024-09-12 RX ORDER — PROPOFOL 10 MG/ML
INJECTION, EMULSION INTRAVENOUS PRN
Status: DISCONTINUED | OUTPATIENT
Start: 2024-09-12 | End: 2024-09-12

## 2024-09-12 RX ORDER — FENTANYL CITRATE 50 UG/ML
50 INJECTION, SOLUTION INTRAMUSCULAR; INTRAVENOUS EVERY 5 MIN PRN
Status: DISCONTINUED | OUTPATIENT
Start: 2024-09-12 | End: 2024-09-12 | Stop reason: HOSPADM

## 2024-09-12 RX ORDER — ONDANSETRON 2 MG/ML
INJECTION INTRAMUSCULAR; INTRAVENOUS PRN
Status: DISCONTINUED | OUTPATIENT
Start: 2024-09-12 | End: 2024-09-12

## 2024-09-12 RX ORDER — GLYCOPYRROLATE 0.2 MG/ML
INJECTION, SOLUTION INTRAMUSCULAR; INTRAVENOUS PRN
Status: DISCONTINUED | OUTPATIENT
Start: 2024-09-12 | End: 2024-09-12

## 2024-09-12 RX ORDER — PROCHLORPERAZINE MALEATE 5 MG
5 TABLET ORAL EVERY 6 HOURS PRN
Status: DISCONTINUED | OUTPATIENT
Start: 2024-09-12 | End: 2024-09-20 | Stop reason: HOSPADM

## 2024-09-12 RX ORDER — POLYETHYLENE GLYCOL 3350 17 G/17G
17 POWDER, FOR SOLUTION ORAL DAILY
Status: DISCONTINUED | OUTPATIENT
Start: 2024-09-13 | End: 2024-09-14

## 2024-09-12 RX ORDER — ONDANSETRON 4 MG/1
4 TABLET, ORALLY DISINTEGRATING ORAL EVERY 6 HOURS PRN
Status: DISCONTINUED | OUTPATIENT
Start: 2024-09-12 | End: 2024-09-12

## 2024-09-12 RX ORDER — FAMOTIDINE 20 MG/1
20 TABLET, FILM COATED ORAL DAILY
Status: DISCONTINUED | OUTPATIENT
Start: 2024-09-13 | End: 2024-09-20 | Stop reason: HOSPADM

## 2024-09-12 RX ORDER — EPHEDRINE SULFATE 50 MG/ML
INJECTION, SOLUTION INTRAMUSCULAR; INTRAVENOUS; SUBCUTANEOUS PRN
Status: DISCONTINUED | OUTPATIENT
Start: 2024-09-12 | End: 2024-09-12

## 2024-09-12 RX ADMIN — PIPERACILLIN AND TAZOBACTAM 3.38 G: 3; .375 INJECTION, POWDER, FOR SOLUTION INTRAVENOUS at 16:07

## 2024-09-12 RX ADMIN — FAMOTIDINE 20 MG: 20 TABLET ORAL at 08:49

## 2024-09-12 RX ADMIN — ISOSORBIDE MONONITRATE 60 MG: 60 TABLET, EXTENDED RELEASE ORAL at 08:49

## 2024-09-12 RX ADMIN — METOPROLOL SUCCINATE 25 MG: 25 TABLET, EXTENDED RELEASE ORAL at 08:49

## 2024-09-12 RX ADMIN — LIDOCAINE HYDROCHLORIDE 20 MG: 20 INJECTION, SOLUTION INFILTRATION; PERINEURAL at 11:38

## 2024-09-12 RX ADMIN — PROPOFOL 20 MG: 10 INJECTION, EMULSION INTRAVENOUS at 11:40

## 2024-09-12 RX ADMIN — PIPERACILLIN AND TAZOBACTAM 3.38 G: 3; .375 INJECTION, POWDER, FOR SOLUTION INTRAVENOUS at 03:22

## 2024-09-12 RX ADMIN — ONDANSETRON 4 MG: 2 INJECTION INTRAMUSCULAR; INTRAVENOUS at 11:40

## 2024-09-12 RX ADMIN — Medication 5 MG: at 12:43

## 2024-09-12 RX ADMIN — SENNOSIDES AND DOCUSATE SODIUM 1 TABLET: 8.6; 5 TABLET ORAL at 21:04

## 2024-09-12 RX ADMIN — GLYCOPYRROLATE 0.1 MG: 0.2 INJECTION, SOLUTION INTRAMUSCULAR; INTRAVENOUS at 12:25

## 2024-09-12 RX ADMIN — GLYCOPYRROLATE 0.1 MG: 0.2 INJECTION, SOLUTION INTRAMUSCULAR; INTRAVENOUS at 12:33

## 2024-09-12 RX ADMIN — PROPOFOL 125 MCG/KG/MIN: 10 INJECTION, EMULSION INTRAVENOUS at 11:38

## 2024-09-12 RX ADMIN — SODIUM CHLORIDE, POTASSIUM CHLORIDE, SODIUM LACTATE AND CALCIUM CHLORIDE: 600; 310; 30; 20 INJECTION, SOLUTION INTRAVENOUS at 12:46

## 2024-09-12 RX ADMIN — Medication 5 MG: at 12:59

## 2024-09-12 RX ADMIN — SODIUM CHLORIDE, POTASSIUM CHLORIDE, SODIUM LACTATE AND CALCIUM CHLORIDE: 600; 310; 30; 20 INJECTION, SOLUTION INTRAVENOUS at 11:33

## 2024-09-12 RX ADMIN — ROSUVASTATIN 10 MG: 10 TABLET, FILM COATED ORAL at 08:49

## 2024-09-12 RX ADMIN — ACETAMINOPHEN 975 MG: 325 TABLET, FILM COATED ORAL at 16:06

## 2024-09-12 RX ADMIN — AMLODIPINE BESYLATE 2.5 MG: 2.5 TABLET ORAL at 08:50

## 2024-09-12 RX ADMIN — PIPERACILLIN AND TAZOBACTAM 3.38 G: 3; .375 INJECTION, POWDER, FOR SOLUTION INTRAVENOUS at 08:53

## 2024-09-12 RX ADMIN — PIPERACILLIN AND TAZOBACTAM 3.38 G: 3; .375 INJECTION, POWDER, FOR SOLUTION INTRAVENOUS at 21:04

## 2024-09-12 ASSESSMENT — ACTIVITIES OF DAILY LIVING (ADL)
ADLS_ACUITY_SCORE: 32
ADLS_ACUITY_SCORE: 35
ADLS_ACUITY_SCORE: 32
ADLS_ACUITY_SCORE: 35
ADLS_ACUITY_SCORE: 32
ADLS_ACUITY_SCORE: 35
ADLS_ACUITY_SCORE: 32
ADLS_ACUITY_SCORE: 32
ADLS_ACUITY_SCORE: 36
ADLS_ACUITY_SCORE: 32
ADLS_ACUITY_SCORE: 32
ADLS_ACUITY_SCORE: 35
ADLS_ACUITY_SCORE: 32
ADLS_ACUITY_SCORE: 35

## 2024-09-12 ASSESSMENT — ENCOUNTER SYMPTOMS: DYSRHYTHMIAS: 1

## 2024-09-12 NOTE — PLAN OF CARE
"Post op Right foot toe/s amputation.  Denies pain.  Right foot wrapped, unable to assess.  Denies numbness/tingling, dressing c/d/I.  Some intermittent confusion.  Sons report patient shows signs of sundowning.  They are looking into [facility] after TCU at discharge  Problem: Adult Inpatient Plan of Care  Goal: Plan of Care Review  Description: The Plan of Care Review/Shift note should be completed every shift.  The Outcome Evaluation is a brief statement about your assessment that the patient is improving, declining, or no change.  This information will be displayed automatically on your shift  note.  Outcome: Progressing  Goal: Patient-Specific Goal (Individualized)  Description: You can add care plan individualizations to a care plan. Examples of Individualization might be:  \"Parent requests to be called daily at 9am for status\", \"I have a hard time hearing out of my right ear\", or \"Do not touch me to wake me up as it startles  me\".  Outcome: Progressing  Goal: Absence of Hospital-Acquired Illness or Injury  Outcome: Progressing  Intervention: Identify and Manage Fall Risk  Recent Flowsheet Documentation  Taken 9/12/2024 1530 by Glory العراقي RN  Safety Promotion/Fall Prevention:   activity supervised   nonskid shoes/slippers when out of bed   safety round/check completed  Intervention: Prevent Skin Injury  Recent Flowsheet Documentation  Taken 9/12/2024 1530 by Glory العراقي, RN  Body Position: supine, head elevated  Intervention: Prevent Infection  Recent Flowsheet Documentation  Taken 9/12/2024 1530 by Glory العراقي, RN  Infection Prevention:   single patient room provided   hand hygiene promoted  Goal: Optimal Comfort and Wellbeing  Outcome: Progressing  Goal: Readiness for Transition of Care  Outcome: Progressing     Problem: Infection  Goal: Absence of Infection Signs and Symptoms  Outcome: Progressing  Intervention: Prevent or Manage Infection  Recent Flowsheet Documentation  Taken 9/12/2024 1530 " by Glory العراقي, RN  Infection Management: aseptic technique maintained     Problem: Fall Injury Risk  Goal: Absence of Fall and Fall-Related Injury  Outcome: Progressing  Intervention: Identify and Manage Contributors  Recent Flowsheet Documentation  Taken 9/12/2024 1530 by Glory العراقي, RN  Medication Review/Management:   medications reviewed   high-risk medications identified  Intervention: Promote Injury-Free Environment  Recent Flowsheet Documentation  Taken 9/12/2024 1530 by Glory العراقي, RN  Safety Promotion/Fall Prevention:   activity supervised   nonskid shoes/slippers when out of bed   safety round/check completed   Goal Outcome Evaluation:

## 2024-09-12 NOTE — PROGRESS NOTES
[facility]    Medicine Progress Note - Hospitalist Service    Date of Admission:  9/9/2024    Assessment & Plan     Gregory Zhong is a 83 year old male with PMH including CKD stage IIIb, A-fib/flutter s/p ablation chronically on warfarin, HTN, HLD, neuropathy, prostate cancer, bilateral renal masses, GERD, and new or cognitive impairment who presents with his son for evaluation after a fall.  The patient lives alone in a house, but his son lives about a mile away.  There have been concerns for cognitive impairment and he is due to see a neurologist in clinic for further workup.  The patient was moving his yard waste up into the curb when he says he slipped and fell.  He does not think he lost consciousness.  He denies hitting his head, but he has facial abrasions.  He denies any headache.  He did have pain in his left third finger.  Denies any recent falls, but his son says that he has appeared to have some difficulty walking with the right leg.  The patient denies any fevers, chills, nausea, vomiting, cough.  Found to have left third finger dislocation and right lower extremity cellulitis with right foot osteomyelitis.     Right lower extremity cellulitis.  Right foot ulcer.  Right foot sesamoid osteomyelitis.  Sepsis due to cellulitis and osteomyelitis.  -Initially started on IV vancomycin and Zosyn.  -MRSA PCR negative.  -Stop IV vancomycin.  -Continue IV Zosyn.  -Podiatry following.  -Plan to take to operating room on 9/12.     Left third finger dislocation.  -Appreciate orthopedic surgery input.  -Pain medications as needed.  -Recommended to wear finger splint.     Drug-induced coagulation defect.  Supratherapeutic INR.  -INR initially 3.8.  -Improved to 1.9 today.  -Continue to hold warfarin.  -Recheck INR tomorrow morning.     Chronic atrial fibrillation.  Hypertension.  Hyperlipidemia.  -Hold warfarin as above.  -Continue metoprolol succinate 25 mg a day.  -Hold amlodipine 2.5 mg a  "day.  -Hold isosorbide mononitrate 60 mg a day.  -Continue rosuvastatin 10 mg a day.     Cognitive impairment.  -Suspect chronic.  -Occupational Therapy consult.     GERD.  -Continue famotidine 20 mg twice a day.     Chronic kidney disease stage IIIb.  -Creatinine appears to be near baseline.  -Avoid nephrotoxins as able.  -Recheck metabolic panel intermittently.     Acute on chronic hyperkalemia.  -Recheck metabolic panel intermittently.  -Lokelma if needed.     History of prostate cancer.  History of bilateral renal mass.  -Status post previous bilateral renal mass ablation.                Diet: NPO per Anesthesia Guidelines for Procedure/Surgery Except for: Meds    DVT Prophylaxis: Warfarin  Galloway Catheter: Not present  Lines: None     Cardiac Monitoring: None  Code Status: Full Code      Clinically Significant Risk Factors               # Coagulation Defect: INR = 1.88 (Ref range: 0.85 - 1.15) and/or PTT = N/A, will monitor for bleeding              # Overweight: Estimated body mass index is 27.1 kg/m  as calculated from the following:    Height as of this encounter: 1.905 m (6' 3\").    Weight as of this encounter: 98.3 kg (216 lb 12.8 oz)., PRESENT ON ADMISSION                  Disposition Plan     Medically Ready for Discharge: Anticipated in 2-4 Days             Patricio Cole,   Hospitalist Service  [facility]  Securely message with MysteryD (more info)  Text page via goAct Paging/Directory   ______________________________________________________________________    Interval History   [facility] this morning.  Denies chest pain, shortness of breath, fevers, chills, nausea, vomiting.    Physical Exam   Vital Signs: Temp: 97.8  F (36.6  C) Temp src: Temporal BP: 131/84 Pulse: 64   Resp: 18 SpO2: 95 % O2 Device: None (Room air)    Weight: 216 lbs 12.8 oz    Gen:  NAD, A&Ox2 to person and place.  Mild trouble with time.  Eyes:  PERRL, sclera anicteric.  OP:  MMM, no lesions.  Neck:  " Supple.  CV:  Regular, no loud murmurs.  Lung:  CTA b/l, normal effort.  Ab:  +BS, soft.  Skin:  Warm, dry to touch.  Erythema right lower extremity.  Ext:  1+ pitting edema LE b/l.  Right lower extremity does appear mildly larger than left lower extremity.      Medical Decision Making       45 MINUTES SPENT BY ME on the date of service doing chart review, history, exam, documentation & further activities per the note.      Data     I have personally reviewed the following data over the past 24 hrs:    18.2 (H)  \   11.1 (L)   / 200     139 111 (H) 36.0 (H) /  89   4.2 19 (L) 1.68 (H) \     Procal: N/A CRP: 72.41 (H) Lactic Acid: N/A       INR:  1.88 (H) PTT:  N/A   D-dimer:  N/A Fibrinogen:  N/A       Imaging results reviewed over the past 24 hrs:   No results found for this or any previous visit (from the past 24 hour(s)).

## 2024-09-12 NOTE — ANESTHESIA PREPROCEDURE EVALUATION
Anesthesia Pre-Procedure Evaluation    Patient: Gregory Zhong   MRN: [id number] : 1940        Procedure : Procedure(s):  Right foot partial first ray resection, partial third digit amputation          Past Medical History:   Diagnosis Date    Atrial flutter (H)     Bladder cancer (H)     Chronic atrial fibrillation (H)     Chronic kidney disease, stage III (moderate) (H)     Dyslipidemia     Gastroesophageal reflux disease     History of basal cell carcinoma     HLD (hyperlipidemia)     Hyperparathyroidism (H24)     Hypertension     Mild cognitive impairment     Prostate cancer (H)     Tremor       Past Surgical History:   Procedure Laterality Date    APPENDECTOMY      Cryoablation of left and right renal masses      CYSTOSCOPY      Excision of basal cell carcinoma      MOHS MICROGRAPHIC PROCEDURE      TONSILLECTOMY        No Known Allergies   Social History     Tobacco Use    Smoking status: Never    Smokeless tobacco: Not on file   Substance Use Topics    Alcohol use: Not on file      Wt Readings from Last 1 Encounters:   09/10/24 98.3 kg (216 lb 12.8 oz)        Anesthesia Evaluation            ROS/MED HX  ENT/Pulmonary:       Neurologic:     (+) delerium, resolved No. dementia,                             Cardiovascular:     (+) Dyslipidemia hypertension- -  CAD -  - -   Taking blood thinners                     dysrhythmias, a-fib,             METS/Exercise Tolerance:     Hematologic:     (+)      anemia,          Musculoskeletal:       GI/Hepatic:       Renal/Genitourinary:     (+) renal disease, type: CRI, Pt does not require dialysis,           Endo:     (+)  type II DM,       Diabetic complications: nephropathy neuropathy.      Obesity,       Psychiatric/Substance Use:       Infectious Disease:       Malignancy:       Other:            Physical Exam    Airway        Mallampati: II       Respiratory Devices and Support         Dental           Cardiovascular   cardiovascular exam normal       Rhythm  "and rate: regular and normal     Pulmonary   pulmonary exam normal        breath sounds clear to auscultation           OUTSIDE LABS:  CBC:   Lab Results   Component Value Date    WBC 18.2 (H) 09/12/2024    WBC 19.5 (H) 09/11/2024    HGB 11.1 (L) 09/12/2024    HGB 11.1 (L) 09/11/2024    HCT 34.1 (L) 09/12/2024    HCT 34.4 (L) 09/11/2024     09/12/2024     09/11/2024     BMP:   Lab Results   Component Value Date     09/12/2024     09/11/2024    POTASSIUM 4.2 09/12/2024    POTASSIUM 4.5 09/11/2024    CHLORIDE 111 (H) 09/12/2024    CHLORIDE 109 (H) 09/11/2024    CO2 19 (L) 09/12/2024    CO2 20 (L) 09/11/2024    BUN 36.0 (H) 09/12/2024    BUN 31.6 (H) 09/11/2024    CR 1.68 (H) 09/12/2024    CR 1.53 (H) 09/11/2024     (H) 09/12/2024    GLC 97 09/11/2024     COAGS:   Lab Results   Component Value Date    INR 1.88 (H) 09/12/2024     POC: No results found for: \"BGM\", \"HCG\", \"HCGS\"  HEPATIC:   Lab Results   Component Value Date    ALBUMIN 3.9 09/09/2024    PROTTOTAL 6.7 09/09/2024    ALT 26 09/09/2024    AST 25 09/09/2024    ALKPHOS 116 09/09/2024    BILITOTAL 0.6 09/09/2024     OTHER:   Lab Results   Component Value Date    LACT 1.2 09/10/2024    A1C 5.2 09/09/2024    DAVID 9.5 09/12/2024    MAG 2.0 09/11/2024    SED 14 09/10/2024       Anesthesia Plan    ASA Status:  4    NPO Status:  NPO Appropriate    Anesthesia Type: MAC.   Induction: Intravenous.   Maintenance: TIVA.        Consents    Anesthesia Plan(s) and associated risks, benefits, and realistic alternatives discussed. Questions answered and patient/representative(s) expressed understanding.     - Discussed: Risks, Benefits and Alternatives for BOTH SEDATION and the PROCEDURE were discussed     - Discussed with:  Patient       Use of blood products discussed: Yes.     - Discussed with: Patient.     - Consented: consented to blood products            Reason for refusal: other.     Postoperative Care    Pain management: IV analgesics, " Oral pain medications.   PONV prophylaxis: Ondansetron (or other 5HT-3), Dexamethasone or Solumedrol     Comments:               Katie Wick MD    I have reviewed the pertinent notes and labs in the chart from the past 30 days and (re)examined the patient.  Any updates or changes from those notes are reflected in this note.

## 2024-09-12 NOTE — ANESTHESIA CARE TRANSFER NOTE
Patient: Gregory Zhong    Procedure: Procedure(s):  Right foot partial first ray resection, partial third digit amputation       Diagnosis: Ulcer of right foot with fat layer exposed (H) [L97.512]  Diagnosis Additional Information: No value filed.    Anesthesia Type:   MAC     Note:    Oropharynx: oral airway in place and spontaneously breathing  Level of Consciousness: drowsy  Oxygen Supplementation: face mask    Independent Airway: airway patency satisfactory and stable  Dentition: dentition unchanged  Vital Signs Stable: post-procedure vital signs reviewed and stable  Report to RN Given: handoff report given  Patient transferred to: PACU    Handoff Report: Identifed the Patient, Identified the Reponsible Provider, Reviewed the pertinent medical history, Discussed the surgical course, Reviewed Intra-OP anesthesia mangement and issues during anesthesia, Set expectations for post-procedure period and Allowed opportunity for questions and acknowledgement of understanding      Vitals:  Vitals Value Taken Time   /62 09/12/24 1320   Temp     Pulse 62 09/12/24 1322   Resp 24 09/12/24 1322   SpO2 99 % 09/12/24 1322   Vitals shown include unfiled device data.    Electronically Signed By: KARLA Ruiz CRNA  September 12, 2024  1:22 PM

## 2024-09-12 NOTE — BRIEF OP NOTE
[facility]    Brief Operative Note    Pre-operative diagnosis: Ulcer of right foot with fat layer exposed (H) [L97.512]  Post-operative diagnosis Same as pre-operative diagnosis    Procedure: 1. Right foot partial first ray resection    2. Right foot partial third digit amputation     3. Right foot excisional debridement down to and including tendon, site measuring 7 cm x 4 cm x 2 cm       Surgeon: Surgeons and Role:     * Nedra Workman DPM - Primary  Anesthesia: MAC   Estimated Blood Loss: 50 mL from 9/12/2024 11:36 AM to 9/12/2024  1:18 PM      Drains: None  Specimens:   ID Type Source Tests Collected by Time Destination   1 : Right Distal 3rd Toe Tissue Toe, Right SURGICAL PATHOLOGY EXAM Nedra Workman DPM 9/12/2024 11:59 AM    2 : right foot,  partial fist ray Amputation, Non-Traumatic Foot, Right SURGICAL PATHOLOGY EXAM Nedra Workman DPM 9/12/2024 12:29 PM    3 : RIGHT FOOT PARTIAL FIRST RAY BONE Bone Curetting Foot, Right SURGICAL PATHOLOGY EXAM Nedra Workman DPM 9/12/2024 12:38 PM    A : Right 3rd Toe Tissue Toe, Right ANAEROBIC BACTERIAL CULTURE ROUTINE, GRAM STAIN, AEROBIC BACTERIAL CULTURE ROUTINE Nedra Workman DPM 9/12/2024 12:00 PM    B : right foot partial  first ray  tissue Tissue Foot, Right ANAEROBIC BACTERIAL CULTURE ROUTINE, GRAM STAIN, AEROBIC BACTERIAL CULTURE ROUTINE Nedra Workman DPM 9/12/2024 12:31 PM      Findings:   Removal of all noted bone infection with first ray resection and distal third digit. Large abscess though opened and drained extending in first interspace dorsally and around second digit, expressing copious purulence.   Complications: None.  Implants: * No implants in log *

## 2024-09-12 NOTE — PLAN OF CARE
"Goal Outcome Evaluation:      Plan of Care Reviewed With: patient    Overall Patient Progress: improvingOverall Patient Progress: improving    Outcome Evaluation: pt is alert and oriented x4, sometimes has intermittently confusion,    Assist of 1 with G B and walker, coumadin on hold, surgical bath done. Zosyn for antibiotic, procedure scheduled for 1345.      Problem: Adult Inpatient Plan of Care  Goal: Plan of Care Review  Description: The Plan of Care Review/Shift note should be completed every shift.  The Outcome Evaluation is a brief statement about your assessment that the patient is improving, declining, or no change.  This information will be displayed automatically on your shift  note.  Outcome: Progressing  Flowsheets (Taken 9/12/2024 9037)  Outcome Evaluation: pt is alert and oriented x4, sometimes has intermittently confusion,  Plan of Care Reviewed With: patient  Overall Patient Progress: improving  Goal: Patient-Specific Goal (Individualized)  Description: You can add care plan individualizations to a care plan. Examples of Individualization might be:  \"Parent requests to be called daily at 9am for status\", \"I have a hard time hearing out of my right ear\", or \"Do not touch me to wake me up as it startles  me\".  Outcome: Progressing  Goal: Absence of Hospital-Acquired Illness or Injury  Outcome: Progressing  Intervention: Identify and Manage Fall Risk  Recent Flowsheet Documentation  Taken 9/11/2024 2238 by Melissa Reyes, RN  Safety Promotion/Fall Prevention:   activity supervised   assistive device/personal items within reach   clutter free environment maintained   nonskid shoes/slippers when out of bed   safety round/check completed  Intervention: Prevent Skin Injury  Recent Flowsheet Documentation  Taken 9/11/2024 2238 by Melissa Reyes RN  Body Position: position changed independently  Intervention: Prevent Infection  Recent Flowsheet Documentation  Taken 9/11/2024 2238 by Denise" Melissa Silverman, RN  Infection Prevention: rest/sleep promoted  Goal: Optimal Comfort and Wellbeing  Outcome: Progressing  Goal: Readiness for Transition of Care  Outcome: Progressing     Problem: Infection  Goal: Absence of Infection Signs and Symptoms  Outcome: Progressing     Problem: Fall Injury Risk  Goal: Absence of Fall and Fall-Related Injury  Outcome: Progressing  Intervention: Identify and Manage Contributors  Recent Flowsheet Documentation  Taken 9/11/2024 2238 by Melissa Reyes, RN  Medication Review/Management: medications reviewed  Intervention: Promote Injury-Free Environment  Recent Flowsheet Documentation  Taken 9/11/2024 2238 by Melissa Reyes, RN  Safety Promotion/Fall Prevention:   activity supervised   assistive device/personal items within reach   clutter free environment maintained   nonskid shoes/slippers when out of bed   safety round/check completed

## 2024-09-12 NOTE — ANESTHESIA POSTPROCEDURE EVALUATION
Patient: Gregory Zhong    Procedure: Procedure(s):  Right foot partial first ray resection, partial third digit amputation       Anesthesia Type:  MAC    Note:  Disposition: Inpatient   Postop Pain Control: Uneventful            Sign Out: Well controlled pain   PONV: No   Neuro/Psych: Uneventful            Sign Out: Acceptable/Baseline neuro status   Airway/Respiratory: Uneventful            Sign Out: Acceptable/Baseline resp. status   CV/Hemodynamics: Uneventful            Sign Out: Acceptable CV status; No obvious hypovolemia; No obvious fluid overload   Other NRE: NONE   DID A NON-ROUTINE EVENT OCCUR? No           Last vitals:  Vitals Value Taken Time   /61 09/12/24 1325   Temp 96.4  F (35.8  C) 09/12/24 1320   Pulse 62 09/12/24 1330   Resp 21 09/12/24 1330   SpO2 100 % 09/12/24 1330       Electronically Signed By: Katie Wick MD  September 12, 2024  1:31 PM

## 2024-09-12 NOTE — OP NOTE
PREOPERATIVE DIAGNOSES:     1.  Right foot sesamoid osteomyelitis   2.  Right foot third digit osteomyelitis    3.  Right foot ulcer      POSTOPERATIVE DIAGNOSES:   1.  Right foot sesamoid osteomyelitis   2.  Right foot third digit osteomyelitis    3.  Right foot ulcer  4. Right foot first interspace abscess        PROCEDURE:     1. Right foot partial first ray resection  2. Right foot partial third digit amputation   3. Right foot excisional debridement down to and including tendon, site measuring 7 cm x 4 cm x 2 cm .        ANESTHESIA:  MAC with local.       HEMOSTASIS:  Electrocautery.       ESTIMATED BLOOD LOSS:  50 mL.       SPECIMENS:  Soft tissue culture and third digit pathology, soft tissue culture and first ray pathology       MATERIALS:  None    Indications: Gregory Zhong  has an ulcer and active infection to the right foot, which likely started as a submet one ulcer. He presented with cellulitis to his foot and leg. His MRI showed osteomyelitis to the distal third digit and the first metatarsal sesamoids only. Given the extent of redness, the open ulcer plantarly and soft tissue changes to the first interspace, discussion was had about a first ray resection, which patient and his sons agreed too. This was to insure all bone infection will be removed and to assist in closure of the submet one site, in order to allow patient to be weightbearing sooner. Procedure was discussed with patient and his sons, at length, including all risks and benefits. Patient agrees to planned procedure.     Procedure: Under mild sedation pt was brought into the OR & placed on the operating table in a supine position. A total of 20 cc of .5% marcaine were injected into the first met in a sampson block formation and into the third digit.  The foot was scrubbed, prepped and draped in an aseptic manner. Procedure started with the third digit. A fish mouthed incision with slightly more plantar skin than dorsal skin was made at the  level of the IPJ. The incision was started dorsally and carried on plantarly to fully encompass the digit.  The incision was deepened through subcutaneous tissue with care being taken to identify and retract all vital neural and vascular structures. A towel clamp was attached to the distal toe to assist in the removal of distal phalanx. A bone cutter was used to transect at the level of the middle phalanx. All bleeders were ligated and cauterized as necessary. Site was then closed with 3-0 prolene.     Next the first ray was addressed. An incision was made along the dorsal first ray, excising all necrotic and infection tissue. The freer elevator was then used to reflect all soft tissue. The sagittal saw was then used to cut through the metatarsal, starting dorsally and moving plantarly. This was continued in through and through cuts and the bones and digits were sharply excised and sent to pathology. All bleeders were ligated and cauterized as necessary. During this procedure, there was copious amounts of purulence located interdigital, extending along the second digit.    Given this, an incision was made along the plantar second digit, extending laterally. This was along necrotic and purulent tissue. Debridement was down to and including tendon. Next a incision was made over the dorsal foot, extending along the second digit and metatarsal. Purulence was expressed from here and site was debrided down to and including tendon, using a rongeur. Continued debridement was done until sites appeared viable.     All resected bone was sent to pathology.  All nonviable soft tissue was resected  insuring no necrotic tissue proximal to the amputation remained. Next copious amounts of saline were used to flush the amputation site.     Last, the plantar ulcer was excised and debrided of nonviable tissue. This also included debridement down to and including flexor tendon. Site was then irrigated and closed with 2-0 nylon. At  completion, the debrided sites measured as above.     Sites were then closed with 2-0 and 3-0 nylon suture.  Upon completion of suturing, a non-adhesive sterile dressing was then placed over the surgical site followed by 4 x 4s, kerlix, & an ACE wrap.     The pt tolerated the procedure & anesthesia well.  He  was transferred to the recovery room with vital signs stable and vascular status intact to the right foot.  Following a period of post-op monitoring the pt will return to his hospital bed with the following written and oral post-op instructions:    Keep dressing clean, dry and intact.  Do not remove dressing.  WB to heel of the RLE  Elevate foot at rest.  Continue IV antibiotics  Contact Dr. Workman if any post-op questions or arrise.     Intraop findings: Significant purulence noted to first interspace. Bone to third digit and first metatarsals appeared grossly viable following amputation.     Post op plan: Cont to hold coumadin. Discussed with patient's sons that patient may need a second debridement. WB to heel of RLE

## 2024-09-13 LAB
ANION GAP SERPL CALCULATED.3IONS-SCNC: 9 MMOL/L (ref 7–15)
BUN SERPL-MCNC: 33.7 MG/DL (ref 8–23)
CALCIUM SERPL-MCNC: 9 MG/DL (ref 8.8–10.4)
CHLORIDE SERPL-SCNC: 110 MMOL/L (ref 98–107)
CREAT SERPL-MCNC: 1.73 MG/DL (ref 0.67–1.17)
EGFRCR SERPLBLD CKD-EPI 2021: 39 ML/MIN/1.73M2
ERYTHROCYTE [DISTWIDTH] IN BLOOD BY AUTOMATED COUNT: 14 % (ref 10–15)
GLUCOSE SERPL-MCNC: 92 MG/DL (ref 70–99)
HCO3 SERPL-SCNC: 20 MMOL/L (ref 22–29)
HCT VFR BLD AUTO: 32.7 % (ref 40–53)
HGB BLD-MCNC: 10.3 G/DL (ref 13.3–17.7)
INR PPP: 2.13 (ref 0.85–1.15)
MCH RBC QN AUTO: 28.9 PG (ref 26.5–33)
MCHC RBC AUTO-ENTMCNC: 31.5 G/DL (ref 31.5–36.5)
MCV RBC AUTO: 92 FL (ref 78–100)
PLATELET # BLD AUTO: 195 10E3/UL (ref 150–450)
POTASSIUM SERPL-SCNC: 4.5 MMOL/L (ref 3.4–5.3)
RBC # BLD AUTO: 3.56 10E6/UL (ref 4.4–5.9)
SODIUM SERPL-SCNC: 139 MMOL/L (ref 135–145)
WBC # BLD AUTO: 12.6 10E3/UL (ref 4–11)

## 2024-09-13 PROCEDURE — 85610 PROTHROMBIN TIME: CPT | Performed by: INTERNAL MEDICINE

## 2024-09-13 PROCEDURE — 250N000013 HC RX MED GY IP 250 OP 250 PS 637: Performed by: PODIATRIST

## 2024-09-13 PROCEDURE — 36415 COLL VENOUS BLD VENIPUNCTURE: CPT | Performed by: INTERNAL MEDICINE

## 2024-09-13 PROCEDURE — 99233 SBSQ HOSP IP/OBS HIGH 50: CPT | Performed by: INTERNAL MEDICINE

## 2024-09-13 PROCEDURE — 120N000001 HC R&B MED SURG/OB

## 2024-09-13 PROCEDURE — 250N000013 HC RX MED GY IP 250 OP 250 PS 637: Performed by: INTERNAL MEDICINE

## 2024-09-13 PROCEDURE — 250N000011 HC RX IP 250 OP 636: Performed by: INTERNAL MEDICINE

## 2024-09-13 PROCEDURE — 80048 BASIC METABOLIC PNL TOTAL CA: CPT | Performed by: INTERNAL MEDICINE

## 2024-09-13 PROCEDURE — 85027 COMPLETE CBC AUTOMATED: CPT | Performed by: INTERNAL MEDICINE

## 2024-09-13 RX ADMIN — FAMOTIDINE 20 MG: 20 TABLET, FILM COATED ORAL at 08:37

## 2024-09-13 RX ADMIN — SENNOSIDES AND DOCUSATE SODIUM 1 TABLET: 8.6; 5 TABLET ORAL at 20:21

## 2024-09-13 RX ADMIN — PIPERACILLIN AND TAZOBACTAM 3.38 G: 3; .375 INJECTION, POWDER, FOR SOLUTION INTRAVENOUS at 14:02

## 2024-09-13 RX ADMIN — SENNOSIDES AND DOCUSATE SODIUM 1 TABLET: 8.6; 5 TABLET ORAL at 08:37

## 2024-09-13 RX ADMIN — PIPERACILLIN AND TAZOBACTAM 3.38 G: 3; .375 INJECTION, POWDER, FOR SOLUTION INTRAVENOUS at 08:38

## 2024-09-13 RX ADMIN — ACETAMINOPHEN 975 MG: 325 TABLET, FILM COATED ORAL at 15:48

## 2024-09-13 RX ADMIN — PIPERACILLIN AND TAZOBACTAM 3.38 G: 3; .375 INJECTION, POWDER, FOR SOLUTION INTRAVENOUS at 03:59

## 2024-09-13 RX ADMIN — POLYETHYLENE GLYCOL 3350 17 G: 17 POWDER, FOR SOLUTION ORAL at 08:38

## 2024-09-13 RX ADMIN — METOPROLOL SUCCINATE 25 MG: 25 TABLET, EXTENDED RELEASE ORAL at 08:40

## 2024-09-13 RX ADMIN — ACETAMINOPHEN 975 MG: 325 TABLET, FILM COATED ORAL at 00:21

## 2024-09-13 RX ADMIN — ACETAMINOPHEN 975 MG: 325 TABLET, FILM COATED ORAL at 08:37

## 2024-09-13 RX ADMIN — ROSUVASTATIN 10 MG: 10 TABLET, FILM COATED ORAL at 08:37

## 2024-09-13 RX ADMIN — PIPERACILLIN AND TAZOBACTAM 3.38 G: 3; .375 INJECTION, POWDER, FOR SOLUTION INTRAVENOUS at 20:21

## 2024-09-13 ASSESSMENT — ACTIVITIES OF DAILY LIVING (ADL)
ADLS_ACUITY_SCORE: 35
ADLS_ACUITY_SCORE: 32
ADLS_ACUITY_SCORE: 35
ADLS_ACUITY_SCORE: 32
ADLS_ACUITY_SCORE: 32
ADLS_ACUITY_SCORE: 35
ADLS_ACUITY_SCORE: 32
ADLS_ACUITY_SCORE: 32
ADLS_ACUITY_SCORE: 39
ADLS_ACUITY_SCORE: 35
ADLS_ACUITY_SCORE: 32
ADLS_ACUITY_SCORE: 32
ADLS_ACUITY_SCORE: 35
ADLS_ACUITY_SCORE: 32
ADLS_ACUITY_SCORE: 32
ADLS_ACUITY_SCORE: 35
ADLS_ACUITY_SCORE: 32
ADLS_ACUITY_SCORE: 35

## 2024-09-13 NOTE — PLAN OF CARE
"Goal Outcome Evaluation:      Plan of Care Reviewed With: patient    Overall Patient Progress: improvingOverall Patient Progress: improving    Outcome Evaluation: Forgetiful intermittently confused, up with assist of one walker and gait, continue on Zosyn,dressing to RLE intact, plan fopr anothe debridement possibly today. Will continue with poc.      Problem: Adult Inpatient Plan of Care  Goal: Plan of Care Review  Description: The Plan of Care Review/Shift note should be completed every shift.  The Outcome Evaluation is a brief statement about your assessment that the patient is improving, declining, or no change.  This information will be displayed automatically on your shift  note.  Outcome: Progressing  Flowsheets (Taken 9/13/2024 0552)  Outcome Evaluation: Forgetiful intermittently confused, up with assist of one walker and gait, continue on Zosyn,dressing to RLE intact, plan fopr anothe debridement possibly today. Will continue with poc.  Plan of Care Reviewed With: patient  Overall Patient Progress: improving  Goal: Patient-Specific Goal (Individualized)  Description: You can add care plan individualizations to a care plan. Examples of Individualization might be:  \"Parent requests to be called daily at 9am for status\", \"I have a hard time hearing out of my right ear\", or \"Do not touch me to wake me up as it startles  me\".  Outcome: Progressing  Goal: Absence of Hospital-Acquired Illness or Injury  Outcome: Progressing  Intervention: Identify and Manage Fall Risk  Recent Flowsheet Documentation  Taken 9/13/2024 0100 by Brooks Salazar, RN  Safety Promotion/Fall Prevention:   activity supervised   nonskid shoes/slippers when out of bed   safety round/check completed  Intervention: Prevent Skin Injury  Recent Flowsheet Documentation  Taken 9/13/2024 0100 by Brooks Salazar, RN  Body Position: supine, head elevated  Intervention: Prevent Infection  Recent Flowsheet Documentation  Taken 9/13/2024 0100 by Martin" Brooks OCONNOR, RN  Infection Prevention:   rest/sleep promoted   hand hygiene promoted  Goal: Optimal Comfort and Wellbeing  Outcome: Progressing  Goal: Readiness for Transition of Care  Outcome: Progressing     Problem: Infection  Goal: Absence of Infection Signs and Symptoms  Outcome: Progressing     Problem: Fall Injury Risk  Goal: Absence of Fall and Fall-Related Injury  Outcome: Progressing  Intervention: Identify and Manage Contributors  Recent Flowsheet Documentation  Taken 9/13/2024 0100 by Brooks Salazar, RN  Medication Review/Management:   medications reviewed   high-risk medications identified  Intervention: Promote Injury-Free Environment  Recent Flowsheet Documentation  Taken 9/13/2024 0100 by Brooks Salazar, RN  Safety Promotion/Fall Prevention:   activity supervised   nonskid shoes/slippers when out of bed   safety round/check completed

## 2024-09-13 NOTE — PLAN OF CARE
"Patient vital signs are at baseline: No,  Reason:  dago.on remote tele  Patient able to ambulate as they were prior to admission or with assist devices provided by therapies during their stay:  Yes A1 gait belt and walker, WBT to R heel  Patient MUST void prior to discharge:  Yes, PVR 5ml and 123ml   Patient able to tolerate oral intake:  Yes  Pain has adequate pain control using Oral analgesics:  Yes, scheduled Tylenol only  Does patient have an identified :  Yes  Has goal D/C date and time been discussed with patient:  No,  Reason:  TBD    Dressing saturated. Notified podiatry and requested dressing to be changed. Dressing change completed. Forgetful. Currently tolerating regular diet but NPO at midnight for I&D tomorrow at 0800. On IV Zosyn. Calm and pleasant with cares. Will continue to provide supportive care.     Problem: Adult Inpatient Plan of Care  Goal: Plan of Care Review  Description: The Plan of Care Review/Shift note should be completed every shift.  The Outcome Evaluation is a brief statement about your assessment that the patient is improving, declining, or no change.  This information will be displayed automatically on your shift  note.  Outcome: Progressing  Flowsheets (Taken 9/13/2024 1621)  Plan of Care Reviewed With: patient  Overall Patient Progress: no change  Goal: Patient-Specific Goal (Individualized)  Description: You can add care plan individualizations to a care plan. Examples of Individualization might be:  \"Parent requests to be called daily at 9am for status\", \"I have a hard time hearing out of my right ear\", or \"Do not touch me to wake me up as it startles  me\".  Outcome: Progressing  Goal: Absence of Hospital-Acquired Illness or Injury  Outcome: Progressing  Intervention: Identify and Manage Fall Risk  Recent Flowsheet Documentation  Taken 9/13/2024 0643 by Alma Rosa Alcaraz RN  Safety Promotion/Fall Prevention:   activity supervised   clutter free environment maintained   nonskid " shoes/slippers when out of bed   safety round/check completed  Intervention: Prevent Skin Injury  Recent Flowsheet Documentation  Taken 9/13/2024 0824 by Alma Rosa Alcaraz RN  Body Position: (SITTING EDGE OF BED)   other (see comments)   weight shifting  Intervention: Prevent Infection  Recent Flowsheet Documentation  Taken 9/13/2024 0824 by Alma Rosa Alcaraz RN  Infection Prevention:   cohorting utilized   hand hygiene promoted  Goal: Optimal Comfort and Wellbeing  Outcome: Progressing  Goal: Readiness for Transition of Care  Outcome: Progressing     Problem: Infection  Goal: Absence of Infection Signs and Symptoms  Outcome: Progressing     Problem: Fall Injury Risk  Goal: Absence of Fall and Fall-Related Injury  Outcome: Progressing  Intervention: Identify and Manage Contributors  Recent Flowsheet Documentation  Taken 9/13/2024 0824 by lAma Rosa Alcaraz RN  Medication Review/Management:   medications reviewed   high-risk medications identified  Intervention: Promote Injury-Free Environment  Recent Flowsheet Documentation  Taken 9/13/2024 0824 by Alma Rosa Alcaraz RN  Safety Promotion/Fall Prevention:   activity supervised   clutter free environment maintained   nonskid shoes/slippers when out of bed   safety round/check completed   Goal Outcome Evaluation:      Plan of Care Reviewed With: patient    Overall Patient Progress: no changeOverall Patient Progress: no change

## 2024-09-13 NOTE — PROVIDER NOTIFICATION
Per tele: pt had 2.0 second pause. Pt sleeping and is snoring. Dr. Ramirez notified.     Tele called again: pt had 3 pauses in a row with the longest one being 2.1. Dr. Ramirez notified again. Requested to be notified if pauses are greater than 2.5 seconds and HR <40

## 2024-09-13 NOTE — PROGRESS NOTES
Patient fit with short camboot.  Son present.  I removed boot once fit.  Verbal, physical, written and contact information given. Patient reluctant to sign initially because wanted to talk to Dr. Workman to confirm why she ordered.   Pita ALSTON.

## 2024-09-13 NOTE — PROGRESS NOTES
[facility]  Hospitalist Progress Note  Irene Ramirez MD 09/13/2024    Reason for Stay (Diagnosis): cellulitis          Assessment and Plan:      Summary of Stay: Gregory Zhong is a 83 year old male with a history of  htn/hlp, AF/flutter s/p ablation chronically on warfarin, most recent echo 2020 with EF 60% with benedicto, CKD 3b with baseline creat 1.7-1.9, mild cognitive impairment, hx of bladder cancer and bilateral renal masses s/p cryoablation, chronic right foot ulcer admitted on 9/9/2024 with a mechanical fall and found to have sepsis relate to unknown Right foot/leg cellulitis around his chronic ulcer and a dislocated left 3rd finger     He lives alone in his own home, he was moving his yard waste up onto the curb when he slipped and fell,He does not think he lost consciousness.  He denies hitting his head, but he has facial abrasions.  He denies any headache.  He did have pain in his left third finger.  Denies any recent falls, but his son says that he has appeared to have some difficulty walking with the right leg.     ER VS notable for fever 100.6  Exam notable for deformed left 3rd digit and R leg/foot erythema and edema with a deep circular right plantar foot ulcer     BMP with baseline CKD with elevated K 6.1 and down to 5.2 on repeat   CBC leukocytosis 24, hgb low 13.2.  Lactic acid 2.9->2.3->1.2 with IVF   INR supra-therapeutic at 4.0    L finger XR with dislocated 3rd digit    L finger dislocation reduced in the ER and splinted   He was admitted empirically on  pip-tazo/vanco-> pip-tazo alone   Underwent I and D with right foot with partial 1st ray amputation and partial 3rd amputation 9/12    Plan for additional I&D 9/14      Problem List:   Right leg/foot cellulitis  Chronic right plantar ulcer of unclear duration   Idiopathic Peripheral neuropathy with impaired sensation   -Blood culture positive on 2nd day of incubation-GPB  has not been speciated yet but verigene testing is negative for  the usual suspects,  repeat BCx 9/12 NG p 1 day  -Underwent I and D with right foot with partial 1st ray amputation and partial 3rd amputation 9/12, culture results with 1+ GPB, 2+ GPC.  Plan is for repeat I&D 9/14  -MRSA swab negative  -rec;d single dose of vanco on admission, on day 4 pip-tazo.  Duration of treatment TBD  -leukocytosis is improving 24->>>12.6 today, CRP is about the same @ 72    Sepsis   Fever/leukocytosis/elevated lactate  -now resolved     Left 3rd finger dislocation   S/p reduction in the ER   Appreciate ortho input, splint in place and should be worn for 2 weeks    Falls  Peripheral neurophathy-idiopathic  PT consultation     Htn, hlp  AF/Flutter chronically on warfarin  Pta regimen is metop xl 25 mg every day, isosorbide mono 60 mg every day, amlodipine 10 mg every day,  rosuvastatin 10 mg every day, and warfarin   -currently just on metop given surgery yesterday, will resume isosorbide today   -cont rosuvastatin   -pharm D to manage warfarin when intermittent need for I and D over       CKD 3 baseline creat 1.7-1.9  Remains at baseline    Mild cognitive impairment  delirium   Did have some delirium on night of admission     Hx of bladder cancer  Bilateral renal masses  S/p cysto and cryoablation       DVT Prophylaxis: Warfarin  Code Status: Full Code  Functional Status: lives alone in his own home, forgot to ask how he gets around  Diet: reg texture  [place]: not needed  Access : PIV      Medically Ready for Discharge: Anticipated in 2-4 Days     Securely message with Websense (more info)  Text page via [employer] Paging/Directory     I updated his 2 sons at the bedside       I spent 50 minutes reviewing epic including prior labs/imaging/medical history and notes related to this encounter  In addition time was spent in interveiwing the patient, communicating with contacts, and medical decision making      Interval History (Subjective):      Feeling well, no n/v, no cp, no sob.  Minimal sensation to  "his feet.                   Physical Exam:      Last Vital Signs:  BP (!) 147/71 (BP Location: Left arm)   Pulse 57   Temp 97.6  F (36.4  C) (Temporal)   Resp 16   Ht 1.905 m (6' 3\")   Wt 98.3 kg (216 lb 12.8 oz)   SpO2 93%   BMI 27.10 kg/m      I/O:  Pleasant nad looks stated age head nc/at.  Sitting in a chair eating lunch. Lungs ctab nl effort rrr no mrg no le edema skin warm and dry no cyanosis or clubbing alert and oriented affect appropriate hughes belly s/nt/nd tolerating po.  Skin warm and dry no cyanosis or clubbing.  Right foot wrapped and in a boot            Medications:      All current medications were reviewed with changes reflected in problem list.         Data:      All new lab and imaging data was reviewed.   Labs:  Recent Labs   Lab 09/13/24  0707      POTASSIUM 4.5   CHLORIDE 110*   CO2 20*   ANIONGAP 9   GLC 92   BUN 33.7*   CR 1.73*   GFRESTIMATED 39*   DAVID 9.0     Recent Labs   Lab 09/13/24  0707   WBC 12.6*   HGB 10.3*   HCT 32.7*   MCV 92        Recent Labs   Lab 09/13/24  0707 09/12/24  1045 09/12/24  0611 09/11/24  0738 09/10/24  0812   GLC 92 89 100* 97 117*     Recent Labs   Lab 09/09/24  2157   AST 25   ALT 26   ALKPHOS 116   BILITOTAL 0.6      Imaging:   Results for orders placed or performed during the hospital encounter of 09/09/24   Fingers XR, 2-3 views, left    Narrative    EXAM: XR FINGER LEFT G/E 2 VIEWS  LOCATION: [facility]  DATE: 9/9/2024    INDICATION: Suspected dislocation after fall.  COMPARISON: None available.      Impression    IMPRESSION: Severe ulnar and posterior subluxation of the left long finger middle phalanx at the proximal interphalangeal joint.  A definitive fracture is not identified. Attention on postreduction imaging.    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 Mild polyarticular osteoarthritis throughout the remainder of the left hand and wrist. Healed, instrumented " fracture of the second metacarpal shaft.     Head CT w/o contrast    Narrative    EXAM: CT HEAD W/O CONTRAST  LOCATION: [facility]  DATE: 9/9/2024    INDICATION: fall, nose bloody, blood thinners  COMPARISON: 7/31/2024.  TECHNIQUE: Routine CT Head without IV contrast. Multiplanar reformats. Dose reduction techniques were used.    FINDINGS:  INTRACRANIAL CONTENTS: No intracranial hemorrhage, extraaxial collection, or mass effect.  No CT evidence of acute infarct. There is scattered low-attenuation within the periventricular and subcortical white matter consistent with diffuse small vessel   ischemic disease. The ventricular system, basal cisterns and cortical sulci are consistent with volume loss.     VISUALIZED ORBITS/SINUSES/MASTOIDS: No intraorbital abnormality. No paranasal sinus mucosal disease. No middle ear or mastoid effusion.    BONES/SOFT TISSUES: No acute abnormality.      Impression    IMPRESSION:  1.  No CT finding of a mass, hemorrhage or focal area suggestive of acute infarct.  2.  Stable diffuse age related changes.   US Lower Extremity Venous Duplex Right    Narrative    EXAM: US LOWER EXTREMITY VENOUS DUPLEX RIGHT  LOCATION: [facility]  DATE: 9/10/2024    INDICATION: Leg swelling and infection  COMPARISON: None.  TECHNIQUE: Venous Duplex ultrasound of the right lower extremity with and without compression, augmentation and duplex. Color flow and spectral Doppler with waveform analysis performed.    FINDINGS: Exam includes the common femoral, femoral, popliteal, and contralateral common femoral veins as well as segmentally visualized deep calf veins and greater saphenous vein.     RIGHT: No deep vein thrombosis. No superficial thrombophlebitis. No popliteal cyst. Soft tissue edema noted in the right calf.      Impression    IMPRESSION:  1.  No deep venous thrombosis in the right lower extremity.   Fingers XR, 2-3 views, left    Narrative    EXAM: XR  FINGER LEFT G/E 2 VIEWS  LOCATION: [facility]  DATE: 9/10/2024    INDICATION: Post reduction  COMPARISON: 09/09/2024      Impression    IMPRESSION: The dislocated middle phalanx of the left third finger has been reduced and now properly articulates at the PIP joint. No fracture. 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 Erosive osteoarthritis at the DIP joint of   the fifth finger. Internal fixation screws left second metacarpal.     MR Foot Right w/o Contrast    Narrative    Exam: MRI of the right foot without contrast dated 9/10/2024.    COMPARISON: None.    CLINICAL HISTORY: Plantar foot ulcer with sepsis.    TECHNIQUE: Multiplanar, multisequence MR imaging of the right foot was  obtained using standard sequences in 3 orthogonal planes without the  use of intravenous or intra-articular gadolinium and contrast.    FINDINGS:    There is an ulcerative defect along the plantar aspect of the distal  first metatarsal. Adjacent to the ulcer, there is low T1 signal with  increased T2 signal in the tibial greater than fibular sesamoid.  Contrast was not administered. Mild increased T2 signal without  increased T1 signal in the great toe proximal phalanx.    Low T1 signal with increased T2 signal in the distal aspect of the  right third toe to phalanx, adjacent to a soft tissue defect.    Atrophy within the musculature about the forefoot with diffuse edema  within the musculature. No discrete abscess although contrast was not  administered. There is diffuse subcutaneous soft tissue edema.    No full-thickness tendon tear or tendon retraction. The Lisfranc  ligament is intact.      Impression    IMPRESSION:  1. Ulcerative lesion along the plantar aspect of the distal first  metatarsal. There is low T1 signal, with increased T2 signal in the  adjacent sesamoids, tibial greater than fibular, MRI findings most  consistent with osteomyelitis. There is subtle increased bone  marrow  edema in the great toe proximal phalanx without low T1 signal,  presumed reactive osteitis.    2. Low T1 signal with increased T2 signal in the distal aspect of the  third toe distal phalanx, MRI findings most consistent with  osteomyelitis, this is adjacent to a soft tissue defect, sagittal  series 4 image 9.    3. Diffuse soft tissue edema within the musculature, likely  representing myositis. No discrete abscess although contrast was not  administered.    4. Diffuse subcutaneous soft tissue edema along dorsal aspect of the  foot, findings most consistent with cellulitis.    SUGEY SKAGGS MD         SYSTEM ID:  [id number]   US DAVID Doppler No Exercise    Narrative    IR DAVID US DAVID DOPPLER NO EXERCISE, 1-2 LEVELS, BILAT   9/10/2024 3:12  PM     HISTORY: Right foot ulcer, evaluate for PAD    COMPARISON: None.    FINDINGS:  Right DAVID:   DP: 0.99  PT: 0.97.    Left DAVID:   DP: 1.01   PT: 1.10.    Waveforms: Triphasic in the distal tibial arteries      Impression    IMPRESSION: Resting ankle-brachial indices are within normal limits.    DAVID CRITERIA:  >0.95 Normal  0.90 - 0.94 Mild  0.5 - 0.89 Moderate  0.2 - 0.49 Severe  <0.2 Critical    GIL CELESTE MD         SYSTEM ID:  [id number]   XR Foot Port Right 2 Views    Narrative    EXAM: XR FOOT PORT RIGHT 2 VIEWS  DATE/TIME: 9/12/2024 1:50 PM    INDICATION: s/p partial amputations  COMPARISON: MRI 9/10/2024      Impression    IMPRESSION: Partial first ray amputation through the distal first  metatarsal. Additional partial amputation of the third toe through the  distal middle phalanx. Expected postoperative soft tissue edema and  scattered foci of gas.       MEKHI MARTIN DO         SYSTEM ID:  [id number]

## 2024-09-13 NOTE — PROGRESS NOTES
[facility]  PROGRESS NOTE    CHIEF COMPLAINT:      I was asked by Tyrone Hernández MD  to evaluate this patient for right foot.    PATIENT HISTORY:  Gregory Zhong is a 83 year old male seen for follow up for his right foot. Had right foot surgery yesterday. States  feels well. Notices less redness and swelling to his leg. His sons are present.         Review of Systems:  A 10 point review of systems was performed and is positive for that noted above in the patient history.  All other areas are negative.     PAST MEDICAL HISTORY:   Past Medical History:   Diagnosis Date    Atrial flutter (H)     Bladder cancer (H)     Chronic atrial fibrillation (H)     Chronic kidney disease, stage III (moderate) (H)     Dyslipidemia     Gastroesophageal reflux disease     History of basal cell carcinoma     HLD (hyperlipidemia)     Hyperparathyroidism (H24)     Hypertension     Mild cognitive impairment     Prostate cancer (H)     Tremor         PAST SURGICAL HISTORY:   Past Surgical History:   Procedure Laterality Date    APPENDECTOMY      Cryoablation of left and right renal masses      CYSTOSCOPY      Excision of basal cell carcinoma      MOHS MICROGRAPHIC PROCEDURE      TONSILLECTOMY          MEDICATIONS:  Reviewed in Epic. Current.     ALLERGIES:  No Known Allergies     SOCIAL HISTORY:   Social History     Socioeconomic History    Marital status:      Spouse name: Not on file    Number of children: Not on file    Years of education: Not on file    Highest education level: Not on file   Occupational History    Not on file   Tobacco Use    Smoking status: Never    Smokeless tobacco: Not on file   Substance and Sexual Activity    Alcohol use: Not on file    Drug use: Not on file    Sexual activity: Not on file   Other Topics Concern    Not on file   Social History Narrative    Not on file     Social Determinants of Health     Financial Resource Strain: Low Risk  (9/10/2024)    Financial  "Resource Strain     Within the past 12 months, have you or your family members you live with been unable to get utilities (heat, electricity) when it was really needed?: No   Food Insecurity: Low Risk  (9/10/2024)    Food Insecurity     Within the past 12 months, did you worry that your food would run out before you got money to buy more?: No     Within the past 12 months, did the food you bought just not last and you didn t have money to get more?: No   Transportation Needs: Low Risk  (9/10/2024)    Transportation Needs     Within the past 12 months, has lack of transportation kept you from medical appointments, getting your medicines, non-medical meetings or appointments, work, or from getting things that you need?: No   Physical Activity: Not on file   Stress: Not on file   Social Connections: Not on file   Interpersonal Safety: Low Risk  (9/10/2024)    Interpersonal Safety     Do you feel physically and emotionally safe where you currently live?: Yes     Within the past 12 months, have you been hit, slapped, kicked or otherwise physically hurt by someone?: No     Within the past 12 months, have you been humiliated or emotionally abused in other ways by your partner or ex-partner?: No   Housing Stability: Low Risk  (9/10/2024)    Housing Stability     Do you have housing? : Yes     Are you worried about losing your housing?: No        FAMILY HISTORY: History reviewed. No pertinent family history.     EXAM:Vitals: BP (!) 147/71 (BP Location: Left arm)   Pulse 57   Temp 97.6  F (36.4  C) (Temporal)   Resp 16   Ht 1.905 m (6' 3\")   Wt 98.3 kg (216 lb 12.8 oz)   SpO2 93%   BMI 27.10 kg/m    BMI= Body mass index is 27.1 kg/m .    LABS:   .a1c  Last Comprehensive Metabolic Panel:  Sodium   Date Value Ref Range Status   09/13/2024 139 135 - 145 mmol/L Final     Potassium   Date Value Ref Range Status   09/13/2024 4.5 3.4 - 5.3 mmol/L Final     Chloride   Date Value Ref Range Status   09/13/2024 110 (H) 98 - 107 " mmol/L Final     Carbon Dioxide (CO2)   Date Value Ref Range Status   09/13/2024 20 (L) 22 - 29 mmol/L Final     Anion Gap   Date Value Ref Range Status   09/13/2024 9 7 - 15 mmol/L Final     Glucose   Date Value Ref Range Status   09/13/2024 92 70 - 99 mg/dL Final     GLUCOSE BY METER POCT   Date Value Ref Range Status   09/12/2024 89 70 - 99 mg/dL Final     Urea Nitrogen   Date Value Ref Range Status   09/13/2024 33.7 (H) 8.0 - 23.0 mg/dL Final     Creatinine   Date Value Ref Range Status   09/13/2024 1.73 (H) 0.67 - 1.17 mg/dL Final     GFR Estimate   Date Value Ref Range Status   09/13/2024 39 (L) >60 mL/min/1.73m2 Final     Comment:     eGFR calculated using 2021 CKD-EPI equation.     Calcium   Date Value Ref Range Status   09/13/2024 9.0 8.8 - 10.4 mg/dL Final     Comment:     Reference intervals for this test were updated on 7/16/2024 to reflect our healthy population more accurately. There may be differences in the flagging of prior results with similar values performed with this method. Those prior results can be interpreted in the context of the updated reference intervals.     Lab Results   Component Value Date    WBC 20.2 09/10/2024     Lab Results   Component Value Date    RBC 4.00 09/10/2024     Lab Results   Component Value Date    HGB 11.8 09/10/2024     Lab Results   Component Value Date    HCT 36.3 09/10/2024     Lab Results   Component Value Date     09/10/2024      Lab Results   Component Value Date    INR 3.70 09/10/2024    INR 3.98 09/09/2024        General appearance: Patient is alert and fully cooperative with history & exam.  No sign of distress is noted during the visit.      Respiratory: Breathing is regular & unlabored while sitting.      HEENT: Hearing is intact to spoken word.  Speech is clear.  No gross evidence of visual impairment that would impact ambulation.      Dermatologic: Right foot improved in appearance. Incision intact to plantar foot and third digit amputation site.  Dorsal foot incision with devascularized changes, extending to dorsal midfoot.      Vascular: Dorsalis pedis and posterior tibial pulses are weakly palpable.      Neurologic: Lower extremity sensation is diminished, bilateral foot, to light touch.  No evidence of neurological-based weakness or contracture in the lower extremities.       Musculoskeletal: Patient is ambulatory without an assistive device or brace.  No gross foot or ankle deformity noted.       Psychiatric: Affect is pleasant & appropriate.      All cultures:  Recent Labs   Lab 09/12/24  1231 09/12/24  1200 09/09/24  2329   CULTURE No growth, less than 1 day  See corresponding culture for results Culture in progress  See corresponding culture for results Positive on the 2nd day of incubation*  Gram positive bacilli*        IMAGING:     IMPRESSION:  1. Ulcerative lesion along the plantar aspect of the distal first  metatarsal. There is low T1 signal, with increased T2 signal in the  adjacent sesamoids, tibial greater than fibular, MRI findings most  consistent with osteomyelitis. There is subtle increased bone marrow  edema in the great toe proximal phalanx without low T1 signal,  presumed reactive osteitis.     2. Low T1 signal with increased T2 signal in the distal aspect of the  third toe distal phalanx, MRI findings most consistent with  osteomyelitis, this is adjacent to a soft tissue defect, sagittal  series 4 image 9.     3. Diffuse soft tissue edema within the musculature, likely  representing myositis. No discrete abscess although contrast was not  administered.     4. Diffuse subcutaneous soft tissue edema along dorsal aspect of the  foot, findings most consistent with cellulitis.    ABIs  FINDINGS:  Right DAVID:   DP: 0.99  PT: 0.97.     Left DAVID:   DP: 1.01   PT: 1.10.     Waveforms: Triphasic in the distal tibial arteries                                                                   IMPRESSION: Resting ankle-brachial indices are within  normal limits.    Cultures:     Toe, Right; Tissue 3rd:    0 Result Notes   important suggestion  Newer results are available. Click to view them now.     Culture See corresponding culture for results               Gram Stain Result  Abnormal   2+ Gram positive cocci      1+ Gram positive bacilli      1+ WBC seen           Foot, Right; Tissue First Ray    0 Result Notes   important suggestion  Newer results are available. Click to view them now.     Culture See corresponding culture for results               Gram Stain Result  Abnormal   2+ Gram positive cocci      1+ WBC seen               ASSESSMENT:  S/p right foot partial third digit amputation, partial first ray resection, first interspace debridement on 9/12  Supra therapeutic INR, > 3.8 on admission  Idiopathic neuropathy      MEDICAL DECISION MAKING:   -Discussed all findings with patient. Chart and imaging reviewed.   -Discussed intra-op findings with patient and sons: namely, significant purulence in first interspace extending plantalry and dorsally to midfoot. Currently incision site also showing some dark blistering to this area.     -Recommendation made for second debridement for tomorrow. Will schedule with Dr. Almendarez. Plan will be for debridement of necrotic tissue. Discussed with patient/family that dorsal site may need to be left open. They agree to this plan.     -NPO after midnight.   -Cont to hold coumadin. INR: 2.13 this morning. Goal INR of approx 1.7 for surgery.     -WB to heel of RLE in CAM boot. Has at bedside. Has been up and to the bathroom.     -Cultures pending. GPC and GPB from third toe and GPC from first ray       Nedra Workman DPM   [facility]  [phone]

## 2024-09-13 NOTE — PROGRESS NOTES
Care Management Follow Up    Length of Stay (days): 3    Expected Discharge Date: 09/16/2024     Concerns to be Addressed: discharge planning     Patient plan of care discussed at interdisciplinary rounds: Yes    Anticipated Discharge Disposition: Skilled Nursing Facility, Transitional Care  Anticipated Discharge Services: None  Anticipated Discharge DME: None    Patient/family educated on Medicare website which has current facility and service quality ratings: yes  Education Provided on the Discharge Plan: Yes  Patient/Family in Agreement with the Plan: yes    Referrals Placed by CM/SW: Post Acute Facilities  Private pay costs discussed: transportation costs        Additional Information:  CM spoke with patient's sons at bedside and updated them on coordination process with [facility]. When patient is medically ready for discharge they confirmed they want patient to transition to [facility] TCU, patient also updated and in agreement.     [facility] was updated that patient is going to have another procedure tomorrow, per 9/13 podiatry note.     Addendum 1437: CM submitted initial insurance auth for TCU to [facility] reference: [id number] with possible admission date 9/15. Will need to confirm facility once approved.     Next Steps: Coordinate with [facility], follow up on [facility] insurance auth, PAS, set up [facility] for discharge.     Nedra Brower, RN, BSN  Inpatient Care Coordination  [facility]  [phone]

## 2024-09-13 NOTE — PLAN OF CARE
"[id number]    Inpatient Progress Note    PRIMARY DIAGNOSIS: SOFT TISSUE INFECTIONS  OUTPATIENT/OBSERVATION GOALS TO BE MET BEFORE DISCHARGE:  Vitals sign stable or return to baseline: Yes    Tolerating oral antibiotics or has home infusion set up if applicable: Yes    Pain status: Improved-controlled with oral pain medications.    Return to near baseline physical activity: No    Discharge Planner Nurse   Safe discharge environment identified: Yes  Barriers to discharge: Yes       Entered by: Mary Paulino RN 09/12/2024 11:21 PM     Please review provider order for any additional goals.     Nurse to notify provider when observation goals have been met and patient is ready for discharge.  Goal Outcome Evaluation:      Plan of Care Reviewed With: patient    Overall Patient Progress: no changeOverall Patient Progress: no change    Outcome Evaluation: A&OX3 [place] right ear, able to make needs known, ABX ministered, HR dago 50-47 bpm close monitoring.    Problem: Adult Inpatient Plan of Care  Goal: Plan of Care Review  Description: The Plan of Care Review/Shift note should be completed every shift.  The Outcome Evaluation is a brief statement about your assessment that the patient is improving, declining, or no change.  This information will be displayed automatically on your shift  note.  Outcome: Progressing  Flowsheets (Taken 9/12/2024 2121)  Outcome Evaluation: A&OX3 [place] right ear, able to make needs known, ABX ministered, HR dago 50-47 bpm close monitoring.  Plan of Care Reviewed With: patient  Overall Patient Progress: no change  Goal: Patient-Specific Goal (Individualized)  Description: You can add care plan individualizations to a care plan. Examples of Individualization might be:  \"Parent requests to be called daily at 9am for status\", \"I have a hard time hearing out of my right ear\", or \"Do not touch me to wake me up as it startles  me\".  Outcome: Progressing  Goal: Absence of Hospital-Acquired Illness or " Injury  Outcome: Progressing  Intervention: Identify and Manage Fall Risk  Recent Flowsheet Documentation  Taken 9/12/2024 2100 by Mary Paulino RN  Safety Promotion/Fall Prevention:   activity supervised   nonskid shoes/slippers when out of bed   safety round/check completed  Intervention: Prevent Skin Injury  Recent Flowsheet Documentation  Taken 9/12/2024 2100 by Mary Paulino RN  Body Position: supine, head elevated  Intervention: Prevent Infection  Recent Flowsheet Documentation  Taken 9/12/2024 2100 by Mary Paulino RN  Infection Prevention:   single patient room provided   hand hygiene promoted  Goal: Optimal Comfort and Wellbeing  Outcome: Progressing  Goal: Readiness for Transition of Care  Outcome: Progressing     Problem: Infection  Goal: Absence of Infection Signs and Symptoms  Outcome: Progressing  Intervention: Prevent or Manage Infection  Recent Flowsheet Documentation  Taken 9/12/2024 2100 by Mary Paulino RN  Infection Management: aseptic technique maintained     Problem: Fall Injury Risk  Goal: Absence of Fall and Fall-Related Injury  Outcome: Progressing  Intervention: Identify and Manage Contributors  Recent Flowsheet Documentation  Taken 9/12/2024 2100 by Mary Paulino RN  Medication Review/Management:   medications reviewed   high-risk medications identified  Intervention: Promote Injury-Free Environment  Recent Flowsheet Documentation  Taken 9/12/2024 2100 by Mary Paulino RN  Safety Promotion/Fall Prevention:   activity supervised   nonskid shoes/slippers when out of bed   safety round/check completed

## 2024-09-14 ENCOUNTER — ANESTHESIA (OUTPATIENT)
Dept: SURGERY | Facility: CLINIC | Age: 84
DRG: 853 | End: 2024-09-14
Payer: COMMERCIAL

## 2024-09-14 ENCOUNTER — ANESTHESIA EVENT (OUTPATIENT)
Dept: SURGERY | Facility: CLINIC | Age: 84
DRG: 853 | End: 2024-09-14
Payer: COMMERCIAL

## 2024-09-14 LAB
ANION GAP SERPL CALCULATED.3IONS-SCNC: 10 MMOL/L (ref 7–15)
BACTERIA SPEC CULT: NORMAL
BACTERIA TISS BX CULT: NORMAL
BACTERIA TISS BX CULT: NORMAL
BUN SERPL-MCNC: 34.2 MG/DL (ref 8–23)
CALCIUM SERPL-MCNC: 9.4 MG/DL (ref 8.8–10.4)
CHLORIDE SERPL-SCNC: 109 MMOL/L (ref 98–107)
CREAT SERPL-MCNC: 1.68 MG/DL (ref 0.67–1.17)
EGFRCR SERPLBLD CKD-EPI 2021: 40 ML/MIN/1.73M2
ERYTHROCYTE [DISTWIDTH] IN BLOOD BY AUTOMATED COUNT: 13.9 % (ref 10–15)
GLUCOSE SERPL-MCNC: 87 MG/DL (ref 70–99)
GRAM STAIN RESULT: NORMAL
HCO3 SERPL-SCNC: 19 MMOL/L (ref 22–29)
HCT VFR BLD AUTO: 34.8 % (ref 40–53)
HGB BLD-MCNC: 11 G/DL (ref 13.3–17.7)
INR PPP: 1.91 (ref 0.85–1.15)
MCH RBC QN AUTO: 28.5 PG (ref 26.5–33)
MCHC RBC AUTO-ENTMCNC: 31.6 G/DL (ref 31.5–36.5)
MCV RBC AUTO: 90 FL (ref 78–100)
PLATELET # BLD AUTO: 231 10E3/UL (ref 150–450)
POTASSIUM SERPL-SCNC: 4.7 MMOL/L (ref 3.4–5.3)
RBC # BLD AUTO: 3.86 10E6/UL (ref 4.4–5.9)
SODIUM SERPL-SCNC: 138 MMOL/L (ref 135–145)
WBC # BLD AUTO: 11.5 10E3/UL (ref 4–11)

## 2024-09-14 PROCEDURE — 36415 COLL VENOUS BLD VENIPUNCTURE: CPT | Performed by: ORTHOPAEDIC SURGERY

## 2024-09-14 PROCEDURE — 250N000013 HC RX MED GY IP 250 OP 250 PS 637: Performed by: PODIATRIST

## 2024-09-14 PROCEDURE — 87102 FUNGUS ISOLATION CULTURE: CPT | Performed by: PODIATRIST

## 2024-09-14 PROCEDURE — 250N000009 HC RX 250: Performed by: PODIATRIST

## 2024-09-14 PROCEDURE — 99232 SBSQ HOSP IP/OBS MODERATE 35: CPT | Performed by: INTERNAL MEDICINE

## 2024-09-14 PROCEDURE — 87205 SMEAR GRAM STAIN: CPT | Performed by: PODIATRIST

## 2024-09-14 PROCEDURE — 250N000011 HC RX IP 250 OP 636: Performed by: NURSE ANESTHETIST, CERTIFIED REGISTERED

## 2024-09-14 PROCEDURE — 999N000141 HC STATISTIC PRE-PROCEDURE NURSING ASSESSMENT: Performed by: PODIATRIST

## 2024-09-14 PROCEDURE — 250N000011 HC RX IP 250 OP 636: Performed by: INTERNAL MEDICINE

## 2024-09-14 PROCEDURE — 0JBQ0ZZ EXCISION OF RIGHT FOOT SUBCUTANEOUS TISSUE AND FASCIA, OPEN APPROACH: ICD-10-PCS | Performed by: PODIATRIST

## 2024-09-14 PROCEDURE — 250N000013 HC RX MED GY IP 250 OP 250 PS 637: Performed by: INTERNAL MEDICINE

## 2024-09-14 PROCEDURE — 250N000011 HC RX IP 250 OP 636: Performed by: PODIATRIST

## 2024-09-14 PROCEDURE — 272N000001 HC OR GENERAL SUPPLY STERILE: Performed by: PODIATRIST

## 2024-09-14 PROCEDURE — 120N000001 HC R&B MED SURG/OB

## 2024-09-14 PROCEDURE — 85610 PROTHROMBIN TIME: CPT | Performed by: ORTHOPAEDIC SURGERY

## 2024-09-14 PROCEDURE — 87070 CULTURE OTHR SPECIMN AEROBIC: CPT | Performed by: PODIATRIST

## 2024-09-14 PROCEDURE — 258N000003 HC RX IP 258 OP 636: Performed by: NURSE ANESTHETIST, CERTIFIED REGISTERED

## 2024-09-14 PROCEDURE — 87075 CULTR BACTERIA EXCEPT BLOOD: CPT | Performed by: PODIATRIST

## 2024-09-14 PROCEDURE — 80048 BASIC METABOLIC PNL TOTAL CA: CPT | Performed by: ORTHOPAEDIC SURGERY

## 2024-09-14 PROCEDURE — 250N000009 HC RX 250: Performed by: NURSE ANESTHETIST, CERTIFIED REGISTERED

## 2024-09-14 PROCEDURE — 360N000082 HC SURGERY LEVEL 2 W/ FLUORO, PER MIN: Performed by: PODIATRIST

## 2024-09-14 PROCEDURE — 11042 DBRDMT SUBQ TIS 1ST 20SQCM/<: CPT | Mod: 78 | Performed by: PODIATRIST

## 2024-09-14 PROCEDURE — 370N000017 HC ANESTHESIA TECHNICAL FEE, PER MIN: Performed by: PODIATRIST

## 2024-09-14 PROCEDURE — 85027 COMPLETE CBC AUTOMATED: CPT | Performed by: ORTHOPAEDIC SURGERY

## 2024-09-14 PROCEDURE — 710N000012 HC RECOVERY PHASE 2, PER MINUTE: Performed by: PODIATRIST

## 2024-09-14 RX ORDER — OXYCODONE HYDROCHLORIDE 5 MG/1
5 TABLET ORAL
Status: DISCONTINUED | OUTPATIENT
Start: 2024-09-14 | End: 2024-09-14 | Stop reason: HOSPADM

## 2024-09-14 RX ORDER — NALOXONE HYDROCHLORIDE 0.4 MG/ML
0.2 INJECTION, SOLUTION INTRAMUSCULAR; INTRAVENOUS; SUBCUTANEOUS
Status: DISCONTINUED | OUTPATIENT
Start: 2024-09-14 | End: 2024-09-20 | Stop reason: HOSPADM

## 2024-09-14 RX ORDER — ACETAMINOPHEN 325 MG/1
975 TABLET ORAL EVERY 8 HOURS
Status: COMPLETED | OUTPATIENT
Start: 2024-09-14 | End: 2024-09-17

## 2024-09-14 RX ORDER — NALOXONE HYDROCHLORIDE 0.4 MG/ML
0.1 INJECTION, SOLUTION INTRAMUSCULAR; INTRAVENOUS; SUBCUTANEOUS
Status: DISCONTINUED | OUTPATIENT
Start: 2024-09-14 | End: 2024-09-14 | Stop reason: HOSPADM

## 2024-09-14 RX ORDER — SODIUM CHLORIDE, SODIUM LACTATE, POTASSIUM CHLORIDE, CALCIUM CHLORIDE 600; 310; 30; 20 MG/100ML; MG/100ML; MG/100ML; MG/100ML
INJECTION, SOLUTION INTRAVENOUS CONTINUOUS PRN
Status: DISCONTINUED | OUTPATIENT
Start: 2024-09-14 | End: 2024-09-14

## 2024-09-14 RX ORDER — LIDOCAINE 40 MG/G
CREAM TOPICAL
Status: DISCONTINUED | OUTPATIENT
Start: 2024-09-14 | End: 2024-09-20 | Stop reason: HOSPADM

## 2024-09-14 RX ORDER — ONDANSETRON 4 MG/1
4 TABLET, ORALLY DISINTEGRATING ORAL EVERY 30 MIN PRN
Status: DISCONTINUED | OUTPATIENT
Start: 2024-09-14 | End: 2024-09-14 | Stop reason: HOSPADM

## 2024-09-14 RX ORDER — ACETAMINOPHEN 325 MG/1
650 TABLET ORAL EVERY 4 HOURS PRN
Status: DISCONTINUED | OUTPATIENT
Start: 2024-09-17 | End: 2024-09-20 | Stop reason: HOSPADM

## 2024-09-14 RX ORDER — BUPIVACAINE HYDROCHLORIDE 5 MG/ML
INJECTION, SOLUTION EPIDURAL; INTRACAUDAL PRN
Status: DISCONTINUED | OUTPATIENT
Start: 2024-09-14 | End: 2024-09-14 | Stop reason: HOSPADM

## 2024-09-14 RX ORDER — ONDANSETRON 2 MG/ML
4 INJECTION INTRAMUSCULAR; INTRAVENOUS EVERY 30 MIN PRN
Status: DISCONTINUED | OUTPATIENT
Start: 2024-09-14 | End: 2024-09-14 | Stop reason: HOSPADM

## 2024-09-14 RX ORDER — ONDANSETRON 4 MG/1
4 TABLET, ORALLY DISINTEGRATING ORAL EVERY 6 HOURS PRN
Status: DISCONTINUED | OUTPATIENT
Start: 2024-09-14 | End: 2024-09-14

## 2024-09-14 RX ORDER — WARFARIN SODIUM 5 MG/1
5 TABLET ORAL ONCE
Status: COMPLETED | OUTPATIENT
Start: 2024-09-14 | End: 2024-09-14

## 2024-09-14 RX ORDER — NALOXONE HYDROCHLORIDE 0.4 MG/ML
0.4 INJECTION, SOLUTION INTRAMUSCULAR; INTRAVENOUS; SUBCUTANEOUS
Status: DISCONTINUED | OUTPATIENT
Start: 2024-09-14 | End: 2024-09-20 | Stop reason: HOSPADM

## 2024-09-14 RX ORDER — OXYCODONE HYDROCHLORIDE 5 MG/1
10 TABLET ORAL
Status: DISCONTINUED | OUTPATIENT
Start: 2024-09-14 | End: 2024-09-14 | Stop reason: HOSPADM

## 2024-09-14 RX ORDER — PROPOFOL 10 MG/ML
INJECTION, EMULSION INTRAVENOUS CONTINUOUS PRN
Status: DISCONTINUED | OUTPATIENT
Start: 2024-09-14 | End: 2024-09-14

## 2024-09-14 RX ORDER — PROCHLORPERAZINE MALEATE 5 MG
5 TABLET ORAL EVERY 6 HOURS PRN
Status: DISCONTINUED | OUTPATIENT
Start: 2024-09-14 | End: 2024-09-14

## 2024-09-14 RX ORDER — ONDANSETRON 2 MG/ML
INJECTION INTRAMUSCULAR; INTRAVENOUS PRN
Status: DISCONTINUED | OUTPATIENT
Start: 2024-09-14 | End: 2024-09-14

## 2024-09-14 RX ORDER — AMOXICILLIN 250 MG
1 CAPSULE ORAL 2 TIMES DAILY
Status: DISCONTINUED | OUTPATIENT
Start: 2024-09-14 | End: 2024-09-20 | Stop reason: HOSPADM

## 2024-09-14 RX ORDER — ONDANSETRON 2 MG/ML
4 INJECTION INTRAMUSCULAR; INTRAVENOUS EVERY 6 HOURS PRN
Status: DISCONTINUED | OUTPATIENT
Start: 2024-09-14 | End: 2024-09-14

## 2024-09-14 RX ORDER — BISACODYL 10 MG
10 SUPPOSITORY, RECTAL RECTAL DAILY PRN
Status: DISCONTINUED | OUTPATIENT
Start: 2024-09-17 | End: 2024-09-20 | Stop reason: HOSPADM

## 2024-09-14 RX ORDER — LIDOCAINE HYDROCHLORIDE 20 MG/ML
INJECTION, SOLUTION INFILTRATION; PERINEURAL PRN
Status: DISCONTINUED | OUTPATIENT
Start: 2024-09-14 | End: 2024-09-14

## 2024-09-14 RX ORDER — MAGNESIUM HYDROXIDE 1200 MG/15ML
LIQUID ORAL PRN
Status: DISCONTINUED | OUTPATIENT
Start: 2024-09-14 | End: 2024-09-14 | Stop reason: HOSPADM

## 2024-09-14 RX ORDER — OXYCODONE HYDROCHLORIDE 5 MG/1
5 TABLET ORAL EVERY 4 HOURS PRN
Status: DISCONTINUED | OUTPATIENT
Start: 2024-09-14 | End: 2024-09-20 | Stop reason: HOSPADM

## 2024-09-14 RX ORDER — PROPOFOL 10 MG/ML
INJECTION, EMULSION INTRAVENOUS PRN
Status: DISCONTINUED | OUTPATIENT
Start: 2024-09-14 | End: 2024-09-14

## 2024-09-14 RX ORDER — SODIUM CHLORIDE, SODIUM LACTATE, POTASSIUM CHLORIDE, CALCIUM CHLORIDE 600; 310; 30; 20 MG/100ML; MG/100ML; MG/100ML; MG/100ML
INJECTION, SOLUTION INTRAVENOUS CONTINUOUS
Status: DISCONTINUED | OUTPATIENT
Start: 2024-09-14 | End: 2024-09-14 | Stop reason: HOSPADM

## 2024-09-14 RX ORDER — POLYETHYLENE GLYCOL 3350 17 G/17G
17 POWDER, FOR SOLUTION ORAL DAILY
Status: DISCONTINUED | OUTPATIENT
Start: 2024-09-15 | End: 2024-09-20 | Stop reason: HOSPADM

## 2024-09-14 RX ORDER — DEXAMETHASONE SODIUM PHOSPHATE 4 MG/ML
4 INJECTION, SOLUTION INTRA-ARTICULAR; INTRALESIONAL; INTRAMUSCULAR; INTRAVENOUS; SOFT TISSUE
Status: DISCONTINUED | OUTPATIENT
Start: 2024-09-14 | End: 2024-09-14 | Stop reason: HOSPADM

## 2024-09-14 RX ADMIN — ONDANSETRON 4 MG: 2 INJECTION INTRAMUSCULAR; INTRAVENOUS at 08:00

## 2024-09-14 RX ADMIN — SODIUM CHLORIDE, POTASSIUM CHLORIDE, SODIUM LACTATE AND CALCIUM CHLORIDE: 600; 310; 30; 20 INJECTION, SOLUTION INTRAVENOUS at 07:55

## 2024-09-14 RX ADMIN — LIDOCAINE HYDROCHLORIDE 30 MG: 20 INJECTION, SOLUTION INFILTRATION; PERINEURAL at 08:00

## 2024-09-14 RX ADMIN — ACETAMINOPHEN 975 MG: 325 TABLET, FILM COATED ORAL at 17:26

## 2024-09-14 RX ADMIN — PIPERACILLIN AND TAZOBACTAM 3.38 G: 3; .375 INJECTION, POWDER, FOR SOLUTION INTRAVENOUS at 02:55

## 2024-09-14 RX ADMIN — WARFARIN SODIUM 5 MG: 5 TABLET ORAL at 17:26

## 2024-09-14 RX ADMIN — ACETAMINOPHEN 975 MG: 325 TABLET, FILM COATED ORAL at 00:17

## 2024-09-14 RX ADMIN — PIPERACILLIN AND TAZOBACTAM 3.38 G: 3; .375 INJECTION, POWDER, FOR SOLUTION INTRAVENOUS at 20:17

## 2024-09-14 RX ADMIN — METOPROLOL TARTRATE 12.5 MG: 25 TABLET, FILM COATED ORAL at 20:17

## 2024-09-14 RX ADMIN — ACETAMINOPHEN 975 MG: 325 TABLET, FILM COATED ORAL at 09:58

## 2024-09-14 RX ADMIN — PROPOFOL 30 MG: 10 INJECTION, EMULSION INTRAVENOUS at 08:00

## 2024-09-14 RX ADMIN — PIPERACILLIN AND TAZOBACTAM 3.38 G: 3; .375 INJECTION, POWDER, FOR SOLUTION INTRAVENOUS at 07:55

## 2024-09-14 RX ADMIN — SENNOSIDES AND DOCUSATE SODIUM 1 TABLET: 8.6; 5 TABLET ORAL at 20:17

## 2024-09-14 RX ADMIN — PROPOFOL 100 MCG/KG/MIN: 10 INJECTION, EMULSION INTRAVENOUS at 08:00

## 2024-09-14 RX ADMIN — PIPERACILLIN AND TAZOBACTAM 3.38 G: 3; .375 INJECTION, POWDER, FOR SOLUTION INTRAVENOUS at 14:30

## 2024-09-14 ASSESSMENT — ACTIVITIES OF DAILY LIVING (ADL)
ADLS_ACUITY_SCORE: 32
ADLS_ACUITY_SCORE: 31
ADLS_ACUITY_SCORE: 31
ADLS_ACUITY_SCORE: 39
ADLS_ACUITY_SCORE: 31
ADLS_ACUITY_SCORE: 32
ADLS_ACUITY_SCORE: 31
ADLS_ACUITY_SCORE: 31
ADLS_ACUITY_SCORE: 39
ADLS_ACUITY_SCORE: 31
ADLS_ACUITY_SCORE: 31
ADLS_ACUITY_SCORE: 32
ADLS_ACUITY_SCORE: 31
ADLS_ACUITY_SCORE: 32
ADLS_ACUITY_SCORE: 31

## 2024-09-14 ASSESSMENT — ENCOUNTER SYMPTOMS: DYSRHYTHMIAS: 1

## 2024-09-14 NOTE — ANESTHESIA CARE TRANSFER NOTE
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    Anesthesia Type:   MAC     Note:    Oropharynx: oropharynx clear of all foreign objects and spontaneously breathing  Level of Consciousness: awake  Oxygen Supplementation: room air    Independent Airway: airway patency satisfactory and stable  Dentition: dentition unchanged  Vital Signs Stable: post-procedure vital signs reviewed and stable  Report to RN Given: handoff report given  Patient transferred to: PACU    Handoff Report: Identifed the Patient, Identified the Reponsible Provider, Reviewed the pertinent medical history, Discussed the surgical course, Reviewed Intra-OP anesthesia mangement and issues during anesthesia, Set expectations for post-procedure period and Allowed opportunity for questions and acknowledgement of understanding    Vitals:  Vitals Value Taken Time   BP     Temp     Pulse     Resp     SpO2 96 % 09/14/24 0839   Vitals shown include unfiled device data.    Electronically Signed By: KARLA Franco CRNA  September 14, 2024  8:40 AM

## 2024-09-14 NOTE — PHARMACY-ANTICOAGULATION SERVICE
Clinical Pharmacy - Warfarin Dosing Consult     Pharmacy has been consulted to manage this patient s warfarin therapy.  Indication: Atrial Fibrillation  Therapy Goal: INR 2-2.5  Warfarin Prior to Admission: Yes  Warfarin PTA Regimen: 5mg daily  Dose Comments: 5mg tonight    INR   Date Value Ref Range Status   09/14/2024 1.91 (H) 0.85 - 1.15 Final   09/13/2024 2.13 (H) 0.85 - 1.15 Final       .Pharmacy will monitor Gregory Zhong daily and order warfarin doses to achieve specified goal.      Please contact pharmacy as soon as possible if the warfarin needs to be held for a procedure or if the warfarin goals change.

## 2024-09-14 NOTE — BRIEF OP NOTE
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]  Cellulitis of right foot [L03.115]  Post-operative diagnosis Same as pre-operative diagnosis    Procedure: Right foot excisional debridement, Right - Foot    Surgeon: Surgeons and Role:     * Catarina Almendarez DPM, Podiatry/Foot and Ankle Surgery - Primary  Anesthesia: MAC   Estimated Blood Loss: 5 mL from 9/14/2024  7:57 AM to 9/14/2024  8:35 AM      Drains: Quarter-inch Iodosorb packing  Specimens:   ID Type Source Tests Collected by Time Destination   A : Right Foot - soft tissue Tissue Foot, Right ANAEROBIC BACTERIAL CULTURE ROUTINE, GRAM STAIN, FUNGAL OR YEAST CULTURE ROUTINE, AEROBIC BACTERIAL CULTURE ROUTINE Catarina Almendarez DPM, Podiatry/Foot and Ankle Surgery 9/14/2024  8:15 AM      Findings:   None.  Complications: None.  Implants: * No implants in log *

## 2024-09-14 NOTE — OP NOTE
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  Excisional debridement of necrotic tissue right foot   Surgeon: Catarina Almendarez, OBI, Podiatry/Foot and Ankle Surgery   Assistant(s): None   Anesthesia: MAC with local     Hemostasis:                             None    Estimated Blood Loss:              5 mL    Speceimens:                            Soft tissue for cultures    Materials:                                  Quarter inch Iodosorb packing      Indications: Mr. Zhong is a 83-year-old man that presented to the hospital with significant right foot infection.  He underwent initial surgery with Dr. Workman on 9/12/2024.  At that time he had his right great toe amputated.  The area was loosely flapped closed.  He was monitored over the next day or 2 but there continued to be discoloration and necrosis spreading to the top of the foot.  It was discussed with patient and patient's son to go in and remove more of the necrosed skin trying to get the infection under control.  Risk benefits and complications were discussed with patient and patient's son and no guarantees were made.  Discussed that this may require multiple more surgeries to get the infection under control but we will monitor him over the next few days.      Procecure: Patient is brought to the operating room placed operating table supine position.  Anesthesia was administered and local was injected.  The foot was prepped and draped using sterile technique.  Attention was directed to the dorsal aspect of the right foot.  The dorsal foot sutures were removed and to the necrotic tissue was  excised using a #15 blade down to subcutaneous tissue.  The wound was flushed with copious amounts of normal saline.  All nonviable tissue was excised to bleeding viable tissue.  Some of the skin was able to be reapproximated with 3-0 Prolene however there was an area of 3 cm x 3-1/2 cm that was not able to be closed and was open.  Deep fascia still exposed.  This area was packed with quarter inch Iodosorb packing and the foot was placed in a dry sterile dressing.  Patient tolerated procedure and anesthesia well was transferred recovery with vital signs stable and vascular status intact.  We will monitor over the next few days and he may likely need a wound VAC if no further necrosis of the skin occurs.    Catarina Almendarez DPM

## 2024-09-14 NOTE — PLAN OF CARE
"Patient vital signs are at baseline: Yes  Patient able to ambulate as they were prior to admission or with assist devices provided by therapies during their stay:  Yes A1 gait belt and walker with boot on. R heel to WBT  Patient MUST void prior to discharge:  Yes  Patient able to tolerate oral intake:  Yes  Pain has adequate pain control using Oral analgesics:  Yes  Does patient have an identified :  Yes  Has goal D/C date and time been discussed with patient:  Yes    Dressing CDI. CMS intact. Denies N/V/SOB or chest pain. Family at bedside. Calm and pleasant with cares. Will continue to provide supportive care.      Problem: Adult Inpatient Plan of Care  Goal: Plan of Care Review  Description: The Plan of Care Review/Shift note should be completed every shift.  The Outcome Evaluation is a brief statement about your assessment that the patient is improving, declining, or no change.  This information will be displayed automatically on your shift  note.  Outcome: Progressing  Flowsheets (Taken 9/14/2024 2835)  Plan of Care Reviewed With:   patient   child  Overall Patient Progress: improving  Goal: Patient-Specific Goal (Individualized)  Description: You can add care plan individualizations to a care plan. Examples of Individualization might be:  \"Parent requests to be called daily at 9am for status\", \"I have a hard time hearing out of my right ear\", or \"Do not touch me to wake me up as it startles  me\".  Outcome: Progressing  Goal: Absence of Hospital-Acquired Illness or Injury  Outcome: Progressing  Intervention: Identify and Manage Fall Risk  Recent Flowsheet Documentation  Taken 9/14/2024 0937 by Alma Rosa Alcaraz, DIANA  Safety Promotion/Fall Prevention:   activity supervised   clutter free environment maintained   nonskid shoes/slippers when out of bed   safety round/check completed  Intervention: Prevent Skin Injury  Recent Flowsheet Documentation  Taken 9/14/2024 0937 by Alma Rosa Alcaraz, RN  Body Position:   weight " shifting   supine, head elevated  Intervention: Prevent Infection  Recent Flowsheet Documentation  Taken 9/14/2024 0937 by Alma Rosa Alcaraz RN  Infection Prevention:   cohorting utilized   single patient room provided   hand hygiene promoted  Goal: Optimal Comfort and Wellbeing  Outcome: Progressing  Goal: Readiness for Transition of Care  Outcome: Progressing     Problem: Infection  Goal: Absence of Infection Signs and Symptoms  Outcome: Progressing     Problem: Fall Injury Risk  Goal: Absence of Fall and Fall-Related Injury  Outcome: Progressing  Intervention: Identify and Manage Contributors  Recent Flowsheet Documentation  Taken 9/14/2024 0937 by Alma Rosa Alcaraz RN  Medication Review/Management: medications reviewed  Intervention: Promote Injury-Free Environment  Recent Flowsheet Documentation  Taken 9/14/2024 0937 by Alma Rosa Alcaraz RN  Safety Promotion/Fall Prevention:   activity supervised   clutter free environment maintained   nonskid shoes/slippers when out of bed   safety round/check completed     Problem: Skin Injury Risk Increased  Goal: Skin Health and Integrity  Outcome: Progressing  Intervention: Optimize Skin Protection  Recent Flowsheet Documentation  Taken 9/14/2024 1418 by Alma Rosa Alcaraz RN  Activity Management:   ambulated to bathroom   up in chair  Taken 9/14/2024 1006 by Alma Rosa Alcaraz RN  Activity Management:   ambulated to bathroom   back to bed  Taken 9/14/2024 0937 by Alma Rosa Alcaraz RN  Activity Management: activity adjusted per tolerance  Head of Bed (HOB) Positioning: HOB at 20-30 degrees   Goal Outcome Evaluation:      Plan of Care Reviewed With: patient, child    Overall Patient Progress: improvingOverall Patient Progress: improving

## 2024-09-14 NOTE — PROGRESS NOTES
[facility]  Hospitalist Progress Note  Irene Ramirez MD 09/14/2024    Reason for Stay (Diagnosis): cellulitis          Assessment and Plan:      Summary of Stay: Gregory Zhong is a 83 year old male with a history of  htn/hlp, AF/flutter s/p ablation chronically on warfarin, most recent echo 2020 with EF 60% with benedicto, CKD 3b with baseline creat 1.7-1.9, mild cognitive impairment, hx of bladder cancer and bilateral renal masses s/p cryoablation, chronic right foot ulcer admitted on 9/9/2024 with a mechanical fall and found to have sepsis relate to unknown Right foot/leg cellulitis around his chronic ulcer and a dislocated left 3rd finger     He lives alone in his own home, he was moving his yard waste up onto the curb when he slipped and fell,He does not think he lost consciousness.  He denies hitting his head, but he has facial abrasions.  He denies any headache.  He did have pain in his left third finger.  Denies any recent falls, but his son says that he has appeared to have some difficulty walking with the right leg.     ER VS notable for fever 100.6  Exam notable for deformed left 3rd digit and R leg/foot erythema and edema with a deep circular right plantar foot ulcer     BMP with baseline CKD with elevated K 6.1 and down to 5.2 on repeat   CBC leukocytosis 24, hgb low 13.2.  Lactic acid 2.9->2.3->1.2 with IVF   INR supra-therapeutic at 4.0    L finger XR with dislocated 3rd digit    L finger dislocation reduced in the ER and splinted   He was admitted empirically on  pip-tazo/vanco-> pip-tazo alone   Underwent I and D with right foot with partial 1st ray amputation and partial 3rd amputation 9/12    Plan for additional I&D 9/14      Problem List:   Right leg/foot cellulitis  Chronic right plantar ulcer of unclear duration   Idiopathic Peripheral neuropathy with impaired sensation   -Blood culture positive on 2nd day of incubation-GPB  has not been speciated yet but verigene testing is negative for  the usual suspects,  repeat BCx 9/12 NG p 1 day  -Underwent I and D with right foot with partial 1st ray amputation and partial 3rd amputation 9/12, culture results with 1+ GPB, 2+ GPC. Repeat I&D 9/14  -MRSA swab negative  -rec;d single dose of vanco on admission, on day 5 pip-tazo.  Duration of treatment TBD  -leukocytosis is improving 24->>>12.6 today, CRP is about the same @ 72    Sepsis   Fever/leukocytosis/elevated lactate  -now resolved     Left 3rd finger dislocation   S/p reduction in the ER   Appreciate ortho input, splint in place and should be worn for 2 weeks    Falls  Peripheral neurophathy-idiopathic  PT consultation     Htn, hlp  AF/Flutter chronically on warfarin  Bradycardia   Pta regimen is metop xl 25 mg every day, isosorbide mono 60 mg every day, amlodipine 10 mg every day,  rosuvastatin 10 mg every day, and warfarin   HR dipping down into the 40's with frequent 2 second pauses   -decreased metop xl to 12.5 mg every day, resumed isosorbide at 60 mg  BPs look good, will cont to hold amlodipine at this time  -back on rosuvastatin  -doesn't look like he will need additional procedures.  Just had I&D today.  Reached out to Dr Almendarez but have not heard back.  I think it's reasonable to resume his warfarin today,     CKD 3 baseline creat 1.7-1.9  Remains at baseline    Mild cognitive impairment  delirium   Did have some delirium on night of admission     Hx of bladder cancer  Bilateral renal masses  S/p cysto and cryoablation     Normocytic anemia       DVT Prophylaxis: Warfarin  Code Status: Full Code  Functional Status: lives alone in his own home, forgot to ask how he gets around  Diet: reg texture  [place]: not needed  Access : PIV      Medically Ready for Discharge: Anticipated in 2-4 Days     Securely message with [facility] MD (more info)  Text page via [employer] Paging/Directory     I updated his 2 sons at the bedside       I spent 40 minutes reviewing epic including prior labs/imaging/medical history and  "notes related to this encounter  In addition time was spent in interveiwing the patient, communicating with contacts, and medical decision making      Interval History (Subjective):      Feeling well, no n/v, no cp, no sob.  Minimal sensation to his feet.   Tolerated his procedure well                   Physical Exam:      Last Vital Signs:  BP (!) 133/94   Pulse 66   Temp 97.7  F (36.5  C) (Temporal)   Resp 16   Ht 1.905 m (6' 3\")   Wt 98.3 kg (216 lb 12.8 oz)   SpO2 99%   BMI 27.10 kg/m      I/O:  Pleasant nad looks stated age head nc/at.  Laying in bed napping after surgery . Lungs ctab nl effort rrr no mrg no le edema skin warm and dry no cyanosis or clubbing alert and oriented affect appropriate hughes belly s/nt/nd tolerating po.  Skin warm and dry no cyanosis or clubbing.  Right foot wrapped and in a boot            Medications:      All current medications were reviewed with changes reflected in problem list.         Data:      All new lab and imaging data was reviewed.   Labs:  Recent Labs   Lab 09/14/24  0554      POTASSIUM 4.7   CHLORIDE 109*   CO2 19*   ANIONGAP 10   GLC 87   BUN 34.2*   CR 1.68*   GFRESTIMATED 40*   DAVID 9.4     Recent Labs   Lab 09/14/24  0554   WBC 11.5*   HGB 11.0*   HCT 34.8*   MCV 90        Recent Labs   Lab 09/14/24  0554 09/13/24  0707 09/12/24  1045 09/12/24  0611 09/11/24  0738   GLC 87 92 89 100* 97     Recent Labs   Lab 09/09/24  2157   AST 25   ALT 26   ALKPHOS 116   BILITOTAL 0.6      Imaging:   Results for orders placed or performed during the hospital encounter of 09/09/24   Fingers XR, 2-3 views, left    Narrative    EXAM: XR FINGER LEFT G/E 2 VIEWS  LOCATION: [facility]  DATE: 9/9/2024    INDICATION: Suspected dislocation after fall.  COMPARISON: None available.      Impression    IMPRESSION: Severe ulnar and posterior subluxation of the left long finger middle phalanx at the proximal interphalangeal joint.  A definitive fracture " is not identified. Attention on postreduction imaging.    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 Mild polyarticular osteoarthritis throughout the remainder of the left hand and wrist. Healed, instrumented fracture of the second metacarpal shaft.     Head CT w/o contrast    Narrative    EXAM: CT HEAD W/O CONTRAST  LOCATION: [facility]  DATE: 9/9/2024    INDICATION: fall, nose bloody, blood thinners  COMPARISON: 7/31/2024.  TECHNIQUE: Routine CT Head without IV contrast. Multiplanar reformats. Dose reduction techniques were used.    FINDINGS:  INTRACRANIAL CONTENTS: No intracranial hemorrhage, extraaxial collection, or mass effect.  No CT evidence of acute infarct. There is scattered low-attenuation within the periventricular and subcortical white matter consistent with diffuse small vessel   ischemic disease. The ventricular system, basal cisterns and cortical sulci are consistent with volume loss.     VISUALIZED ORBITS/SINUSES/MASTOIDS: No intraorbital abnormality. No paranasal sinus mucosal disease. No middle ear or mastoid effusion.    BONES/SOFT TISSUES: No acute abnormality.      Impression    IMPRESSION:  1.  No CT finding of a mass, hemorrhage or focal area suggestive of acute infarct.  2.  Stable diffuse age related changes.   US Lower Extremity Venous Duplex Right    Narrative    EXAM: US LOWER EXTREMITY VENOUS DUPLEX RIGHT  LOCATION: [facility]  DATE: 9/10/2024    INDICATION: Leg swelling and infection  COMPARISON: None.  TECHNIQUE: Venous Duplex ultrasound of the right lower extremity with and without compression, augmentation and duplex. Color flow and spectral Doppler with waveform analysis performed.    FINDINGS: Exam includes the common femoral, femoral, popliteal, and contralateral common femoral veins as well as segmentally visualized deep calf veins and greater saphenous vein.     RIGHT: No deep vein  thrombosis. No superficial thrombophlebitis. No popliteal cyst. Soft tissue edema noted in the right calf.      Impression    IMPRESSION:  1.  No deep venous thrombosis in the right lower extremity.   Fingers XR, 2-3 views, left    Narrative    EXAM: XR FINGER LEFT G/E 2 VIEWS  LOCATION: [facility]  DATE: 9/10/2024    INDICATION: Post reduction  COMPARISON: 09/09/2024      Impression    IMPRESSION: The dislocated middle phalanx of the left third finger has been reduced and now properly articulates at the PIP joint. No fracture. 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 Erosive osteoarthritis at the DIP joint of   the fifth finger. Internal fixation screws left second metacarpal.     MR Foot Right w/o Contrast    Narrative    Exam: MRI of the right foot without contrast dated 9/10/2024.    COMPARISON: None.    CLINICAL HISTORY: Plantar foot ulcer with sepsis.    TECHNIQUE: Multiplanar, multisequence MR imaging of the right foot was  obtained using standard sequences in 3 orthogonal planes without the  use of intravenous or intra-articular gadolinium and contrast.    FINDINGS:    There is an ulcerative defect along the plantar aspect of the distal  first metatarsal. Adjacent to the ulcer, there is low T1 signal with  increased T2 signal in the tibial greater than fibular sesamoid.  Contrast was not administered. Mild increased T2 signal without  increased T1 signal in the great toe proximal phalanx.    Low T1 signal with increased T2 signal in the distal aspect of the  right third toe to phalanx, adjacent to a soft tissue defect.    Atrophy within the musculature about the forefoot with diffuse edema  within the musculature. No discrete abscess although contrast was not  administered. There is diffuse subcutaneous soft tissue edema.    No full-thickness tendon tear or tendon retraction. The Lisfranc  ligament is intact.      Impression    IMPRESSION:  1. Ulcerative  lesion along the plantar aspect of the distal first  metatarsal. There is low T1 signal, with increased T2 signal in the  adjacent sesamoids, tibial greater than fibular, MRI findings most  consistent with osteomyelitis. There is subtle increased bone marrow  edema in the great toe proximal phalanx without low T1 signal,  presumed reactive osteitis.    2. Low T1 signal with increased T2 signal in the distal aspect of the  third toe distal phalanx, MRI findings most consistent with  osteomyelitis, this is adjacent to a soft tissue defect, sagittal  series 4 image 9.    3. Diffuse soft tissue edema within the musculature, likely  representing myositis. No discrete abscess although contrast was not  administered.    4. Diffuse subcutaneous soft tissue edema along dorsal aspect of the  foot, findings most consistent with cellulitis.    SUGEY KSAGGS MD         SYSTEM ID:  [id number]   US DAVID Doppler No Exercise    Narrative    IR DAVID US DAVID DOPPLER NO EXERCISE, 1-2 LEVELS, BILAT   9/10/2024 3:12  PM     HISTORY: Right foot ulcer, evaluate for PAD    COMPARISON: None.    FINDINGS:  Right DAVID:   DP: 0.99  PT: 0.97.    Left DAVID:   DP: 1.01   PT: 1.10.    Waveforms: Triphasic in the distal tibial arteries      Impression    IMPRESSION: Resting ankle-brachial indices are within normal limits.    DAVID CRITERIA:  >0.95 Normal  0.90 - 0.94 Mild  0.5 - 0.89 Moderate  0.2 - 0.49 Severe  <0.2 Critical    GIL CELESTE MD         SYSTEM ID:  [id number]   XR Foot Port Right 2 Views    Narrative    EXAM: XR FOOT PORT RIGHT 2 VIEWS  DATE/TIME: 9/12/2024 1:50 PM    INDICATION: s/p partial amputations  COMPARISON: MRI 9/10/2024      Impression    IMPRESSION: Partial first ray amputation through the distal first  metatarsal. Additional partial amputation of the third toe through the  distal middle phalanx. Expected postoperative soft tissue edema and  scattered foci of gas.       MEKHI MARTIN DO         SYSTEM ID:  [id number]

## 2024-09-14 NOTE — ANESTHESIA POSTPROCEDURE EVALUATION
Patient: Gregory Zhong    Procedure: Procedure(s):  Right foot excisional debridement       Anesthesia Type:  MAC    Note:  Disposition: Inpatient   Postop Pain Control: Uneventful            Sign Out: Well controlled pain   PONV: No   Neuro/Psych: Uneventful            Sign Out: Acceptable/Baseline neuro status   Airway/Respiratory: Uneventful            Sign Out: Acceptable/Baseline resp. status   CV/Hemodynamics: Uneventful            Sign Out: Acceptable CV status; No obvious hypovolemia; No obvious fluid overload   Other NRE: NONE   DID A NON-ROUTINE EVENT OCCUR? No           Last vitals:  Vitals Value Taken Time   /63 09/14/24 1314   Temp 97.6  F (36.4  C) 09/14/24 1314   Pulse 47 09/14/24 1314   Resp 14 09/14/24 1314   SpO2 94 % 09/14/24 1314       Electronically Signed By: Vladimir Telles MD  September 14, 2024  3:00 PM

## 2024-09-14 NOTE — PROGRESS NOTES
Care Management Follow Up    Length of Stay (days): 4    Expected Discharge Date: 09/16/2024         Additional Information:  KEYN spoke to Danielle at [facility] Auth.   She is requesting clinical information for auth request Phone #[phone], Fax #[phone].  KENY sent SNF referral to [facility] today.        Roro Finley, JACKW [phone]

## 2024-09-14 NOTE — PLAN OF CARE
"Goal Outcome Evaluation:      Plan of Care Reviewed With: patient    Overall Patient Progress: no change    Outcome Evaluation: A/O, intermit forgetful; Ax1 GB/walker; Zosyn; ace wrapped R foot; I&D scheduled for this AM, NPO since midnight, CHG bath done        Problem: Adult Inpatient Plan of Care  Goal: Plan of Care Review  Description: The Plan of Care Review/Shift note should be completed every shift.  The Outcome Evaluation is a brief statement about your assessment that the patient is improving, declining, or no change.  This information will be displayed automatically on your shift  note.  Outcome: Progressing  Flowsheets (Taken 9/14/2024 0515)  Outcome Evaluation:   A/O, intermit forgetful   Ax1 GB/walker   Zosyn   ace wrapped R foot   I&D scheduled for this AM, NPO since midnight, CHG bath done  Plan of Care Reviewed With: patient  Overall Patient Progress: no change  Goal: Patient-Specific Goal (Individualized)  Description: You can add care plan individualizations to a care plan. Examples of Individualization might be:  \"Parent requests to be called daily at 9am for status\", \"I have a hard time hearing out of my right ear\", or \"Do not touch me to wake me up as it startles  me\".  Outcome: Progressing  Goal: Absence of Hospital-Acquired Illness or Injury  Outcome: Progressing  Intervention: Identify and Manage Fall Risk  Recent Flowsheet Documentation  Taken 9/13/2024 2021 by Celio Jones, RN  Safety Promotion/Fall Prevention:   safety round/check completed   room organization consistent   patient and family education   nonskid shoes/slippers when out of bed   lighting adjusted   assistive device/personal items within reach   activity supervised  Intervention: Prevent Skin Injury  Recent Flowsheet Documentation  Taken 9/13/2024 2021 by Celio Jones, RN  Body Position: supine, head elevated  Intervention: Prevent and Manage VTE (Venous Thromboembolism) Risk  Recent Flowsheet Documentation  Taken " 9/13/2024 2021 by Celio Jones RN  VTE Prevention/Management: SCDs off (sequential compression devices)  Intervention: Prevent Infection  Recent Flowsheet Documentation  Taken 9/13/2024 2021 by Celio Jones RN  Infection Prevention:   cohorting utilized   hand hygiene promoted   rest/sleep promoted   single patient room provided  Goal: Optimal Comfort and Wellbeing  Outcome: Progressing  Intervention: Monitor Pain and Promote Comfort  Recent Flowsheet Documentation  Taken 9/13/2024 2021 by Celio Jones RN  Pain Management Interventions:   repositioned   rest  Goal: Readiness for Transition of Care  Outcome: Progressing     Problem: Infection  Goal: Absence of Infection Signs and Symptoms  Outcome: Progressing  Intervention: Prevent or Manage Infection  Recent Flowsheet Documentation  Taken 9/13/2024 2021 by Celio Jones RN  Infection Management: aseptic technique maintained     Problem: Fall Injury Risk  Goal: Absence of Fall and Fall-Related Injury  Outcome: Progressing  Intervention: Identify and Manage Contributors  Recent Flowsheet Documentation  Taken 9/13/2024 2021 by Celio Jones RN  Medication Review/Management: medications reviewed  Intervention: Promote Injury-Free Environment  Recent Flowsheet Documentation  Taken 9/13/2024 2021 by Celio Jones RN  Safety Promotion/Fall Prevention:   safety round/check completed   room organization consistent   patient and family education   nonskid shoes/slippers when out of bed   lighting adjusted   assistive device/personal items within reach   activity supervised     Problem: Skin Injury Risk Increased  Goal: Skin Health and Integrity  Outcome: Progressing  Intervention: Plan: Nurse Driven Intervention: Moisture Management  Recent Flowsheet Documentation  Taken 9/13/2024 2100 by Celio Jones RN  Bathing/Skin Care:   bath, complete   dressed/undressed   electrode patches/site rotation   linen changed   bath, chlorhexidine liquid  Intervention:  Optimize Skin Protection  Recent Flowsheet Documentation  Taken 9/13/2024 2021 by Celio Jones, RN  Activity Management:   activity adjusted per tolerance   activity encouraged   ambulated in room   up in chair  Head of Bed (HOB) Positioning: HOB at 30-45 degrees

## 2024-09-14 NOTE — ANESTHESIA PREPROCEDURE EVALUATION
Anesthesia Pre-Procedure Evaluation    Patient: Gregory Zhong   MRN: [id number] : 1940        Procedure : Procedure(s):  Right foot excisional debridement          Past Medical History:   Diagnosis Date    Atrial flutter (H)     Bladder cancer (H)     Chronic atrial fibrillation (H)     Chronic kidney disease, stage III (moderate) (H)     Dyslipidemia     Gastroesophageal reflux disease     History of basal cell carcinoma     HLD (hyperlipidemia)     Hyperparathyroidism (H24)     Hypertension     Mild cognitive impairment     Prostate cancer (H)     Tremor       Past Surgical History:   Procedure Laterality Date    AMPUTATE TOE(S) Right 2024    Procedure: Right foot partial first ray resection, right foot partial third digit amputation, right foot excisional debridement down to and including tendon, site measuring 7 cm x 4 cm x 2 cm;  Surgeon: Nedra Workman DPM;  Location: [facility] OR    APPENDECTOMY      Cryoablation of left and right renal masses      CYSTOSCOPY      Excision of basal cell carcinoma      MOHS MICROGRAPHIC PROCEDURE      TONSILLECTOMY        No Known Allergies   Social History     Tobacco Use    Smoking status: Never    Smokeless tobacco: Not on file   Substance Use Topics    Alcohol use: Not on file      Wt Readings from Last 1 Encounters:   09/10/24 98.3 kg (216 lb 12.8 oz)        Anesthesia Evaluation   Pt has had prior anesthetic. Type: General.    No history of anesthetic complications       ROS/MED HX  ENT/Pulmonary:  - neg pulmonary ROS     Neurologic: Comment: Mild cognitive impairment      Cardiovascular:     (+) Dyslipidemia hypertension- -   -  - -                        dysrhythmias, a-fib,             METS/Exercise Tolerance:     Hematologic:     (+)      anemia,          Musculoskeletal:   (+)  arthritis,             GI/Hepatic:     (+) GERD,                   Renal/Genitourinary:     (+) renal disease, type: CRI,            Endo:  - neg endo ROS     Psychiatric/Substance  "Use:  - neg psychiatric ROS     Infectious Disease: Comment: R foot      Malignancy:   (+) Malignancy, History of Prostate and Skin.    Other:            Physical Exam    Airway        Mallampati: II       Respiratory Devices and Support         Dental       (+) Modest Abnormalities - crowns, retainers, 1 or 2 missing teeth      Cardiovascular   cardiovascular exam normal          Pulmonary   pulmonary exam normal                OUTSIDE LABS:  CBC:   Lab Results   Component Value Date    WBC 11.5 (H) 09/14/2024    WBC 12.6 (H) 09/13/2024    HGB 11.0 (L) 09/14/2024    HGB 10.3 (L) 09/13/2024    HCT 34.8 (L) 09/14/2024    HCT 32.7 (L) 09/13/2024     09/14/2024     09/13/2024     BMP:   Lab Results   Component Value Date     09/14/2024     09/13/2024    POTASSIUM 4.7 09/14/2024    POTASSIUM 4.5 09/13/2024    CHLORIDE 109 (H) 09/14/2024    CHLORIDE 110 (H) 09/13/2024    CO2 19 (L) 09/14/2024    CO2 20 (L) 09/13/2024    BUN 34.2 (H) 09/14/2024    BUN 33.7 (H) 09/13/2024    CR 1.68 (H) 09/14/2024    CR 1.73 (H) 09/13/2024    GLC 87 09/14/2024    GLC 92 09/13/2024     COAGS:   Lab Results   Component Value Date    INR 1.91 (H) 09/14/2024     POC: No results found for: \"BGM\", \"HCG\", \"HCGS\"  HEPATIC:   Lab Results   Component Value Date    ALBUMIN 3.9 09/09/2024    PROTTOTAL 6.7 09/09/2024    ALT 26 09/09/2024    AST 25 09/09/2024    ALKPHOS 116 09/09/2024    BILITOTAL 0.6 09/09/2024     OTHER:   Lab Results   Component Value Date    LACT 1.2 09/10/2024    A1C 5.2 09/09/2024    DAVID 9.4 09/14/2024    MAG 2.0 09/11/2024    SED 14 09/10/2024       Anesthesia Plan    ASA Status:  3       Anesthesia Type: MAC.     - Reason for MAC: straight local not clinically adequate              Consents    Anesthesia Plan(s) and associated risks, benefits, and realistic alternatives discussed. Questions answered and patient/representative(s) expressed understanding.     - Discussed:     - Discussed with:  Patient    "         Postoperative Care    Pain management: Oral pain medications, IV analgesics, Multi-modal analgesia.   PONV prophylaxis: Ondansetron (or other 5HT-3), Dexamethasone or Solumedrol     Comments:               Vladimir Telles MD    I have reviewed the pertinent notes and labs in the chart from the past 30 days and (re)examined the patient.  Any updates or changes from those notes are reflected in this note.

## 2024-09-15 ENCOUNTER — APPOINTMENT (OUTPATIENT)
Dept: OCCUPATIONAL THERAPY | Facility: CLINIC | Age: 84
DRG: 853 | End: 2024-09-15
Attending: INTERNAL MEDICINE
Payer: COMMERCIAL

## 2024-09-15 LAB
ANION GAP SERPL CALCULATED.3IONS-SCNC: 9 MMOL/L (ref 7–15)
BACTERIA BLD CULT: ABNORMAL
BACTERIA BLD CULT: ABNORMAL
BACTERIA TISS BX CULT: NORMAL
BUN SERPL-MCNC: 32.9 MG/DL (ref 8–23)
CALCIUM SERPL-MCNC: 9.6 MG/DL (ref 8.8–10.4)
CHLORIDE SERPL-SCNC: 110 MMOL/L (ref 98–107)
CREAT SERPL-MCNC: 1.75 MG/DL (ref 0.67–1.17)
EGFRCR SERPLBLD CKD-EPI 2021: 38 ML/MIN/1.73M2
ERYTHROCYTE [DISTWIDTH] IN BLOOD BY AUTOMATED COUNT: 13.7 % (ref 10–15)
GLUCOSE SERPL-MCNC: 92 MG/DL (ref 70–99)
HCO3 SERPL-SCNC: 20 MMOL/L (ref 22–29)
HCT VFR BLD AUTO: 34.5 % (ref 40–53)
HGB BLD-MCNC: 11 G/DL (ref 13.3–17.7)
INR PPP: 1.6 (ref 0.85–1.15)
MCH RBC QN AUTO: 28.8 PG (ref 26.5–33)
MCHC RBC AUTO-ENTMCNC: 31.9 G/DL (ref 31.5–36.5)
MCV RBC AUTO: 90 FL (ref 78–100)
PLATELET # BLD AUTO: 255 10E3/UL (ref 150–450)
POTASSIUM SERPL-SCNC: 4.3 MMOL/L (ref 3.4–5.3)
RBC # BLD AUTO: 3.82 10E6/UL (ref 4.4–5.9)
SODIUM SERPL-SCNC: 139 MMOL/L (ref 135–145)
WBC # BLD AUTO: 10.9 10E3/UL (ref 4–11)

## 2024-09-15 PROCEDURE — 250N000011 HC RX IP 250 OP 636: Performed by: PODIATRIST

## 2024-09-15 PROCEDURE — 120N000001 HC R&B MED SURG/OB

## 2024-09-15 PROCEDURE — 99232 SBSQ HOSP IP/OBS MODERATE 35: CPT | Performed by: INTERNAL MEDICINE

## 2024-09-15 PROCEDURE — 97530 THERAPEUTIC ACTIVITIES: CPT | Mod: GO

## 2024-09-15 PROCEDURE — 97165 OT EVAL LOW COMPLEX 30 MIN: CPT | Mod: GO

## 2024-09-15 PROCEDURE — 250N000013 HC RX MED GY IP 250 OP 250 PS 637: Performed by: INTERNAL MEDICINE

## 2024-09-15 PROCEDURE — 80048 BASIC METABOLIC PNL TOTAL CA: CPT | Performed by: INTERNAL MEDICINE

## 2024-09-15 PROCEDURE — 36415 COLL VENOUS BLD VENIPUNCTURE: CPT | Performed by: INTERNAL MEDICINE

## 2024-09-15 PROCEDURE — 85048 AUTOMATED LEUKOCYTE COUNT: CPT | Performed by: INTERNAL MEDICINE

## 2024-09-15 PROCEDURE — 250N000013 HC RX MED GY IP 250 OP 250 PS 637: Performed by: PODIATRIST

## 2024-09-15 PROCEDURE — 85610 PROTHROMBIN TIME: CPT | Performed by: INTERNAL MEDICINE

## 2024-09-15 PROCEDURE — 97535 SELF CARE MNGMENT TRAINING: CPT | Mod: GO

## 2024-09-15 PROCEDURE — 250N000011 HC RX IP 250 OP 636: Performed by: INTERNAL MEDICINE

## 2024-09-15 RX ORDER — ENOXAPARIN SODIUM 100 MG/ML
80 INJECTION SUBCUTANEOUS EVERY 24 HOURS
Status: COMPLETED | OUTPATIENT
Start: 2024-09-15 | End: 2024-09-15

## 2024-09-15 RX ADMIN — POLYETHYLENE GLYCOL 3350 17 G: 17 POWDER, FOR SOLUTION ORAL at 08:43

## 2024-09-15 RX ADMIN — ROSUVASTATIN 10 MG: 10 TABLET, FILM COATED ORAL at 08:43

## 2024-09-15 RX ADMIN — PIPERACILLIN AND TAZOBACTAM 3.38 G: 3; .375 INJECTION, POWDER, FOR SOLUTION INTRAVENOUS at 03:59

## 2024-09-15 RX ADMIN — METOPROLOL TARTRATE 12.5 MG: 25 TABLET, FILM COATED ORAL at 20:07

## 2024-09-15 RX ADMIN — ACETAMINOPHEN 975 MG: 325 TABLET, FILM COATED ORAL at 03:38

## 2024-09-15 RX ADMIN — ACETAMINOPHEN 975 MG: 325 TABLET, FILM COATED ORAL at 10:27

## 2024-09-15 RX ADMIN — ENOXAPARIN SODIUM 80 MG: 80 INJECTION SUBCUTANEOUS at 12:23

## 2024-09-15 RX ADMIN — SENNOSIDES AND DOCUSATE SODIUM 1 TABLET: 8.6; 5 TABLET ORAL at 20:07

## 2024-09-15 RX ADMIN — PIPERACILLIN AND TAZOBACTAM 3.38 G: 3; .375 INJECTION, POWDER, FOR SOLUTION INTRAVENOUS at 14:52

## 2024-09-15 RX ADMIN — PIPERACILLIN AND TAZOBACTAM 3.38 G: 3; .375 INJECTION, POWDER, FOR SOLUTION INTRAVENOUS at 20:07

## 2024-09-15 RX ADMIN — SENNOSIDES AND DOCUSATE SODIUM 1 TABLET: 8.6; 5 TABLET ORAL at 08:43

## 2024-09-15 RX ADMIN — FAMOTIDINE 20 MG: 20 TABLET, FILM COATED ORAL at 08:43

## 2024-09-15 RX ADMIN — PIPERACILLIN AND TAZOBACTAM 3.38 G: 3; .375 INJECTION, POWDER, FOR SOLUTION INTRAVENOUS at 08:43

## 2024-09-15 RX ADMIN — ACETAMINOPHEN 975 MG: 325 TABLET, FILM COATED ORAL at 17:50

## 2024-09-15 ASSESSMENT — ACTIVITIES OF DAILY LIVING (ADL)
ADLS_ACUITY_SCORE: 36
ADLS_ACUITY_SCORE: 35
ADLS_ACUITY_SCORE: 31
ADLS_ACUITY_SCORE: 31
PREVIOUS_RESPONSIBILITIES: MEAL PREP;HOUSEKEEPING;LAUNDRY;MEDICATION MANAGEMENT;FINANCES;DRIVING
ADLS_ACUITY_SCORE: 31
ADLS_ACUITY_SCORE: 36
ADLS_ACUITY_SCORE: 31
ADLS_ACUITY_SCORE: 35
ADLS_ACUITY_SCORE: 31
ADLS_ACUITY_SCORE: 35
ADLS_ACUITY_SCORE: 31

## 2024-09-15 NOTE — PROGRESS NOTES
"Podiatry / Foot and Ankle Surgery Progress Note    September 15, 2024    Subject: Patient was seen at bedside.  Notes that he is doing well.  Denies pain to the foot.    Objective:  Vitals: /66 (BP Location: Left arm)   Pulse (!) 47   Temp 98.1  F (36.7  C) (Temporal)   Resp 18   Ht 1.905 m (6' 3\")   Wt 98.3 kg (216 lb 12.8 oz)   SpO2 92%   BMI 27.10 kg/m    BMI= Body mass index is 27.1 kg/m .    WBC Count   Date Value Ref Range Status   09/15/2024 10.9 4.0 - 11.0 10e3/uL Final     A1C: 5.2 (9/9/2024)    General:  Patient is alert and orientated.  NAD.    Vascular:  DP and PT pulses are palpable.  No varicosities noted  CFT's < 3secs.  Skin temp is normal.     Neuro:  Light and gross touch sensation diminished to feet.    Derm:  Dressing is c/d/I. Sutures intact.  Bloody drainage noted to Kerlix roll.  Open wound to dorsal left foot measures roughly 3.0cm x 3.5cm x 1.0cm.  No further necrosis seen today.  Packing was pulled from the dorsal foot wound.  No redness, purulent drainage or signs of acute infection noted.    Musculoskeletal:  Previously amputated right great toe and partial right 3rd toe.     Imaging: right foot post op xray -I personally reviewed images.  Partial first ray amputation through the distal first  metatarsal. Additional partial amputation of the third toe through the  distal middle phalanx. Expected postoperative soft tissue edema and  scattered foci of gas    Cultures:      3+ Streptococcus intermedius Abnormal        Assessment: 83 yr old male with chronic kidney disease, s/p partial right 1st ray and partial right 3rd toe amputations and repeat Excisional debridement right foot wound.    Plan:    -Dressing changed at bedside.    -Will continue to monitor for further tissue necrosis the next day or two.    -will likely need wound vac to the top of the foot to assist with healing given the amount of tissue loss.  Ordered wound nurse consult for VAC application.    -Wound VAC " paperwork is in paper chart.    -Keep foot and dressing dry.    -Minimal heel weight bearing for transfers in post op shoe right foot.     -Dr. Workman will follow up on patient tomorrow.         Catarina Almendarez DPM, Podiatry/Foot and Ankle Surgery  7:42 AM

## 2024-09-15 NOTE — PROVIDER NOTIFICATION
Call light went off and support staff went into the room, found pt on the floor, with the recliner tilted over. Pt reported he didn't hit his head but was going to leave the hospital and go home. No visible injury noted. Current /82, HR:50 T:97.6 RR 20, O2 97 on RA. Denies pain. Took four staff members to take pt off the floor but was able to walk after he stood up. Pt sundown after 1400. Dr. Ramirez notified.

## 2024-09-15 NOTE — PLAN OF CARE
"Patient vital signs are at baseline: No,  Reason: on remote tele. dago  Patient able to ambulate as they were prior to admission or with assist devices provided by therapies during their stay:  Yes A1 gait belt and walker, boot with minimal WB  Patient MUST void prior to discharge:  Yes  Patient able to tolerate oral intake:  Yes  Pain has adequate pain control using Oral analgesics:  Yes, pain managed with scheduled Tylenol only.  Does patient have an identified :  Yes  Has goal D/C date and time been discussed with patient:  No,  Reason:  TBD     Initiated Lovenox injection to per Dr. Ramirez. Dressing CDI. CMS intact. Denies any chest pain, N/V or SOB. Calm and pleasant with cares. Will continue to provide supportive care.    Problem: Adult Inpatient Plan of Care  Goal: Plan of Care Review  Description: The Plan of Care Review/Shift note should be completed every shift.  The Outcome Evaluation is a brief statement about your assessment that the patient is improving, declining, or no change.  This information will be displayed automatically on your shift  note.  Outcome: Progressing  Flowsheets (Taken 9/15/2024 1350)  Plan of Care Reviewed With:   patient   family  Overall Patient Progress: improving  Goal: Patient-Specific Goal (Individualized)  Description: You can add care plan individualizations to a care plan. Examples of Individualization might be:  \"Parent requests to be called daily at 9am for status\", \"I have a hard time hearing out of my right ear\", or \"Do not touch me to wake me up as it startles  me\".  Outcome: Progressing  Goal: Absence of Hospital-Acquired Illness or Injury  Outcome: Progressing  Intervention: Identify and Manage Fall Risk  Recent Flowsheet Documentation  Taken 9/15/2024 0812 by Alma Rosa Alcaraz, RN  Safety Promotion/Fall Prevention:   activity supervised   clutter free environment maintained   mobility aid in reach   nonskid shoes/slippers when out of bed   safety round/check " completed  Intervention: Prevent and Manage VTE (Venous Thromboembolism) Risk  Recent Flowsheet Documentation  Taken 9/15/2024 0834 by Alma Rosa Alcaraz RN  VTE Prevention/Management: SCDs on (sequential compression devices)  Intervention: Prevent Infection  Recent Flowsheet Documentation  Taken 9/15/2024 0834 by Alma Rosa Alcaraz RN  Infection Prevention:   cohorting utilized   hand hygiene promoted   single patient room provided  Goal: Optimal Comfort and Wellbeing  Outcome: Progressing  Goal: Readiness for Transition of Care  Outcome: Progressing     Problem: Infection  Goal: Absence of Infection Signs and Symptoms  Outcome: Progressing     Problem: Fall Injury Risk  Goal: Absence of Fall and Fall-Related Injury  Outcome: Progressing  Intervention: Identify and Manage Contributors  Recent Flowsheet Documentation  Taken 9/15/2024 0834 by Alma Rosa Alcaraz RN  Medication Review/Management: medications reviewed  Intervention: Promote Injury-Free Environment  Recent Flowsheet Documentation  Taken 9/15/2024 0834 by Alma Rosa Alcaraz RN  Safety Promotion/Fall Prevention:   activity supervised   clutter free environment maintained   mobility aid in reach   nonskid shoes/slippers when out of bed   safety round/check completed     Problem: Skin Injury Risk Increased  Goal: Skin Health and Integrity  Outcome: Progressing  Intervention: Plan: Nurse Driven Intervention: Moisture Management  Recent Flowsheet Documentation  Taken 9/15/2024 1100 by Alma Rosa Alcaraz RN  Moisture Interventions: Encourage regular toileting  Taken 9/15/2024 0834 by Alma Rosa Alcaraz RN  Moisture Interventions:   Encourage regular toileting   Incontinence pad  Intervention: Optimize Skin Protection  Recent Flowsheet Documentation  Taken 9/15/2024 0834 by Alma Rosa Alcaraz RN  Activity Management:   ambulated to bathroom   up in chair   Goal Outcome Evaluation:      Plan of Care Reviewed With: patient, family    Overall Patient Progress: improvingOverall Patient Progress:  improving

## 2024-09-15 NOTE — PROGRESS NOTES
[facility]  Hospitalist Progress Note  Irene Ramirez MD 09/15/2024    Reason for Stay (Diagnosis): cellulitis          Assessment and Plan:      Summary of Stay: Gregory Zhong is a 83 year old male with a history of  htn/hlp, AF/flutter s/p ablation chronically on warfarin, most recent echo 2020 with EF 60% with benedicto, CKD 3b with baseline creat 1.7-1.9, mild cognitive impairment, hx of bladder cancer and bilateral renal masses s/p cryoablation, chronic right foot ulcer admitted on 9/9/2024 with a mechanical fall and found to have sepsis relate to unknown Right foot/leg cellulitis around his chronic ulcer and a dislocated left 3rd finger     He lives alone in his own home, he was moving his yard waste up onto the curb when he slipped and fell,He does not think he lost consciousness.  He denies hitting his head, but he has facial abrasions.  He denies any headache.  He did have pain in his left third finger.  Denies any recent falls, but his son says that he has appeared to have some difficulty walking with the right leg.     ER VS notable for fever 100.6  Exam notable for deformed left 3rd digit and R leg/foot erythema and edema with a deep circular right plantar foot ulcer     BMP with baseline CKD with elevated K 6.1 and down to 5.2 on repeat   CBC leukocytosis 24, hgb low 13.2.  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 Repeat I&D 9/14  -MRSA swab negative  -rec;d single dose of vanco on admission, on day 6 pip-tazo.  Duration of treatment TBD  -leukocytosis is improving 24->>>10.9 today, CRP is about the same @ 72    Sepsis   Fever/leukocytosis/elevated lactate  -now resolved     Left 3rd finger dislocation   S/p reduction in the ER   Appreciate ortho input, splint in place and should be worn for 2 weeks    Falls  Peripheral neurophathy-idiopathic  PT consultation     Htn, hlp  AF/Flutter chronically on warfarin  Bradycardia   Pta regimen is metop xl 25 mg every day, isosorbide mono 60 mg every day, amlodipine 10 mg every day,  rosuvastatin 10 mg every day, and warfarin   HR dipping down into the 40's with frequent 2 second pauses   -decreased metop xl to 12.5 mg every day but still with pauses and one was 4 seconds long as he briefly went into aflutter.  Also with bradycardia into the high 30's so stopped metop all together 9/15  -resumed isosorbide at 60 mg  BPs look good, will cont to hold amlodipine at this time  -back on rosuvastatin  -not clear if he'll need additional procedures or not.  His warfarin has been on hold or only intermittently given and he's on day 6 so getting to the end of comfortable zone without AC.         *enox 80 mg x 1 today.  Reassess in am.  At this juncture when warfarin is resumed will prolly need enox overlap     CKD 3 baseline creat 1.7-1.9  Remains at baseline    Mild cognitive impairment  delirium   Did have some delirium on night of admission     Hx of bladder cancer  Bilateral renal masses  S/p cysto and cryoablation     Normocytic anemia   First noticed 7/2024, B12 wnl.  Does not appear to have been worked up further  Given progression will ck iron studies but suspect more likely due to chronic kidney disease      DVT  "Prophylaxis: Warfarin  Code Status: Full Code  Functional Status: lives alone in his own home, forgot to ask how he gets around  Diet: reg texture  [place]: not needed  Access : PIV      Medically Ready for Discharge: Anticipated in 2-4 Days     Securely message with [employer] (more info)  Text page via [employer] Paging/Directory     I updated his sister and son at the bedside       I spent 35 minutes reviewing epic including prior labs/imaging/medical history and notes related to this encounter  In addition time was spent in interveiwing the patient, communicating with contacts, and medical decision making      Interval History (Subjective):      Feeling well, no n/v, no cp, no sob.  Minimal sensation to his feet.                     Physical Exam:      Last Vital Signs:  /69 (BP Location: Left arm)   Pulse 50   Temp 97.9  F (36.6  C) (Temporal)   Resp 18   Ht 1.905 m (6' 3\")   Wt 98.3 kg (216 lb 12.8 oz)   SpO2 97%   BMI 27.10 kg/m      I/O:  Pleasant nad looks stated age head nc/at.  Up in chair eating lunch. Lungs ctab nl effort rrr no mrg no le edema skin warm and dry no cyanosis or clubbing alert and oriented affect appropriate hughes belly s/nt/nd tolerating po.  Skin warm and dry no cyanosis or clubbing.  Right foot wrapped and in a boot            Medications:      All current medications were reviewed with changes reflected in problem list.         Data:      All new lab and imaging data was reviewed.   Labs:  Recent Labs   Lab 09/15/24  0646      POTASSIUM 4.3   CHLORIDE 110*   CO2 20*   ANIONGAP 9   GLC 92   BUN 32.9*   CR 1.75*   GFRESTIMATED 38*   DAVID 9.6     Recent Labs   Lab 09/15/24  0646   WBC 10.9   HGB 11.0*   HCT 34.5*   MCV 90        Recent Labs   Lab 09/15/24  0646 09/14/24  0554 09/13/24  0707 09/12/24  1045 09/12/24  0611   GLC 92 87 92 89 100*     Recent Labs   Lab 09/09/24  2157   AST 25   ALT 26   ALKPHOS 116   BILITOTAL 0.6      Imaging:   Results for orders placed or " performed during the hospital encounter of 09/09/24   Fingers XR, 2-3 views, left    Narrative    EXAM: XR FINGER LEFT G/E 2 VIEWS  LOCATION: [facility]  DATE: 9/9/2024    INDICATION: Suspected dislocation after fall.  COMPARISON: None available.      Impression    IMPRESSION: Severe ulnar and posterior subluxation of the left long finger middle phalanx at the proximal interphalangeal joint.  A definitive fracture is not identified. Attention on postreduction imaging.    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 Mild polyarticular osteoarthritis throughout the remainder of the left hand and wrist. Healed, instrumented fracture of the second metacarpal shaft.     Head CT w/o contrast    Narrative    EXAM: CT HEAD W/O CONTRAST  LOCATION: [facility]  DATE: 9/9/2024    INDICATION: fall, nose bloody, blood thinners  COMPARISON: 7/31/2024.  TECHNIQUE: Routine CT Head without IV contrast. Multiplanar reformats. Dose reduction techniques were used.    FINDINGS:  INTRACRANIAL CONTENTS: No intracranial hemorrhage, extraaxial collection, or mass effect.  No CT evidence of acute infarct. There is scattered low-attenuation within the periventricular and subcortical white matter consistent with diffuse small vessel   ischemic disease. The ventricular system, basal cisterns and cortical sulci are consistent with volume loss.     VISUALIZED ORBITS/SINUSES/MASTOIDS: No intraorbital abnormality. No paranasal sinus mucosal disease. No middle ear or mastoid effusion.    BONES/SOFT TISSUES: No acute abnormality.      Impression    IMPRESSION:  1.  No CT finding of a mass, hemorrhage or focal area suggestive of acute infarct.  2.  Stable diffuse age related changes.   US Lower Extremity Venous Duplex Right    Narrative    EXAM: US LOWER EXTREMITY VENOUS DUPLEX RIGHT  LOCATION: [facility]  DATE: 9/10/2024    INDICATION: Leg  swelling and infection  COMPARISON: None.  TECHNIQUE: Venous Duplex ultrasound of the right lower extremity with and without compression, augmentation and duplex. Color flow and spectral Doppler with waveform analysis performed.    FINDINGS: Exam includes the common femoral, femoral, popliteal, and contralateral common femoral veins as well as segmentally visualized deep calf veins and greater saphenous vein.     RIGHT: No deep vein thrombosis. No superficial thrombophlebitis. No popliteal cyst. Soft tissue edema noted in the right calf.      Impression    IMPRESSION:  1.  No deep venous thrombosis in the right lower extremity.   Fingers XR, 2-3 views, left    Narrative    EXAM: XR FINGER LEFT G/E 2 VIEWS  LOCATION: [facility]  DATE: 9/10/2024    INDICATION: Post reduction  COMPARISON: 09/09/2024      Impression    IMPRESSION: The dislocated middle phalanx of the left third finger has been reduced and now properly articulates at the PIP joint. No fracture. 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 Erosive osteoarthritis at the DIP joint of   the fifth finger. Internal fixation screws left second metacarpal.     MR Foot Right w/o Contrast    Narrative    Exam: MRI of the right foot without contrast dated 9/10/2024.    COMPARISON: None.    CLINICAL HISTORY: Plantar foot ulcer with sepsis.    TECHNIQUE: Multiplanar, multisequence MR imaging of the right foot was  obtained using standard sequences in 3 orthogonal planes without the  use of intravenous or intra-articular gadolinium and contrast.    FINDINGS:    There is an ulcerative defect along the plantar aspect of the distal  first metatarsal. Adjacent to the ulcer, there is low T1 signal with  increased T2 signal in the tibial greater than fibular sesamoid.  Contrast was not administered. Mild increased T2 signal without  increased T1 signal in the great toe proximal phalanx.    Low T1 signal with increased T2 signal  in the distal aspect of the  right third toe to phalanx, adjacent to a soft tissue defect.    Atrophy within the musculature about the forefoot with diffuse edema  within the musculature. No discrete abscess although contrast was not  administered. There is diffuse subcutaneous soft tissue edema.    No full-thickness tendon tear or tendon retraction. The Lisfranc  ligament is intact.      Impression    IMPRESSION:  1. Ulcerative lesion along the plantar aspect of the distal first  metatarsal. There is low T1 signal, with increased T2 signal in the  adjacent sesamoids, tibial greater than fibular, MRI findings most  consistent with osteomyelitis. There is subtle increased bone marrow  edema in the great toe proximal phalanx without low T1 signal,  presumed reactive osteitis.    2. Low T1 signal with increased T2 signal in the distal aspect of the  third toe distal phalanx, MRI findings most consistent with  osteomyelitis, this is adjacent to a soft tissue defect, sagittal  series 4 image 9.    3. Diffuse soft tissue edema within the musculature, likely  representing myositis. No discrete abscess although contrast was not  administered.    4. Diffuse subcutaneous soft tissue edema along dorsal aspect of the  foot, findings most consistent with cellulitis.    SUGEY SKAGGS MD         SYSTEM ID:  [id number]   US DAVID Doppler No Exercise    Narrative    IR DAVID US DAVID DOPPLER NO EXERCISE, 1-2 LEVELS, BILAT   9/10/2024 3:12  PM     HISTORY: Right foot ulcer, evaluate for PAD    COMPARISON: None.    FINDINGS:  Right DAVID:   DP: 0.99  PT: 0.97.    Left DAVID:   DP: 1.01   PT: 1.10.    Waveforms: Triphasic in the distal tibial arteries      Impression    IMPRESSION: Resting ankle-brachial indices are within normal limits.    DAVID CRITERIA:  >0.95 Normal  0.90 - 0.94 Mild  0.5 - 0.89 Moderate  0.2 - 0.49 Severe  <0.2 Critical    GIL CELESTE MD         SYSTEM ID:  [id number]   XR Foot Port Right 2 Views    Narrative    EXAM: XR  FOOT PORT RIGHT 2 VIEWS  DATE/TIME: 9/12/2024 1:50 PM    INDICATION: s/p partial amputations  COMPARISON: MRI 9/10/2024      Impression    IMPRESSION: Partial first ray amputation through the distal first  metatarsal. Additional partial amputation of the third toe through the  distal middle phalanx. Expected postoperative soft tissue edema and  scattered foci of gas.       MEKHI MARTIN DO         SYSTEM ID:  [id number]

## 2024-09-15 NOTE — PROVIDER NOTIFICATION
[employer]cover notified vocera page regarding tele      Per tele tech pt having more frequent pauses, ranging 2-2.9sec

## 2024-09-15 NOTE — PROVIDER NOTIFICATION
Per tele: non-conducted PACs. Dr. Ramirez notified.    Tele called again: HR 37. Pt up in the chair napping. Notified .

## 2024-09-15 NOTE — PROGRESS NOTES
09/15/24 1345   Appointment Info   Signing Clinician's Name / Credentials (OT) Suzan العراقي OTR/L   Living Environment   People in Home alone   Current Living Arrangements house   Self-Care   Usual Activity Tolerance good   Current Activity Tolerance moderate   Equipment Currently Used at Home shower chair;grab bar, toilet  (Tub/shower)   Fall history within last six months yes   Number of times patient has fallen within last six months 6   Activity/Exercise/Self-Care Comment Pt reports at baseline he is independent in self-cares without gait aid   Instrumental Activities of Daily Living (IADL)   Previous Responsibilities meal prep;housekeeping;laundry;medication management;finances;driving  (Has help with yardwork)   General Information   Onset of Illness/Injury or Date of Surgery 09/09/24   Referring Physician Catarina Almendarez DPM,   Patient/Family Therapy Goal Statement (OT) Go to TCU then [facility]   Additional Occupational Profile Info/Pertinent History of Current Problem Per Hospitalist- Gregory EMERY Trevin is a 83 year old male with a history of  htn/hlp, AF/flutter s/p ablation chronically on warfarin, most recent echo 2020 with EF 60% with benedicto, CKD 3b with baseline creat 1.7-1.9, mild cognitive impairment, hx of bladder cancer and bilateral renal masses s/p cryoablation, chronic right foot ulcer admitted on 9/9/2024 with a mechanical fall and found to have sepsis relate to unknown Right foot/leg cellulitis around his chronic ulcer and a dislocated left 3rd finger      He lives alone in his own home, he was moving his yard waste up onto the curb when he slipped and fell,He does not think he lost consciousness.  He denies hitting his head, but he has facial abrasions.  He denies any headache.  He did have pain in his left third finger.  Denies any recent falls, but his son says that he has appeared to have some difficulty walking with the right leg.      ER VS notable for fever 100.6  Exam notable for deformed  left 3rd digit and R leg/foot erythema and edema with a deep circular right plantar foot ulcer      BMP with baseline CKD with elevated K 6.1 and down to 5.2 on repeat   CBC leukocytosis 24, hgb low 13.2.  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 LUE 3rd digit splint)   Cognitive Status Examination   Affect/Mental Status (Cognitive) WFL   Follows Commands follows one-step commands;75-90% accuracy;increased processing time needed;physical/tactile prompts required;repetition of directions required;delayed response/completion   Memory Deficit moderate deficit   Executive Function Deficit moderate deficit   Visual Perception   Visual Impairment/Limitations corrective lenses full-time   Sensory   Sensory Comments Pt reports baseline neuropathy in feet   Posture   Posture kyphosis   Strength Comprehensive (MMT)   Comment, General Manual Muscle Testing (MMT) Assessment Generalized weakness   Transfers   Transfers toilet transfer;shower transfer   Shower Transfer   Phelps Level (Shower Transfer) minimum assist (75% patient effort)   Toilet Transfer   Phelps Level (Toilet Transfer) minimum assist (75% patient effort)   Activities of Daily Living   BADL Assessment/Intervention toileting;lower body dressing;grooming   Lower Body Dressing Assessment/Training   Phelps Level (Lower Body Dressing) moderate assist (50% patient effort)   Grooming Assessment/Training   Phelps Level (Grooming) minimum assist (75% patient effort)   Toileting   Phelps Level (Toileting) moderate  assist (50% patient effort)   Clinical Impression   Criteria for Skilled Therapeutic Interventions Met (OT) Yes, treatment indicated   OT Diagnosis Decline function   OT Problem List-Impairments impacting ADL activity tolerance impaired;balance;cognition;mobility;sensation;strength;postural control;sensory feedback   Assessment of Occupational Performance 5 or more Performance Deficits   Identified Performance Deficits LB dressing, toileting, bathing, grooming, functional mobility, IADLs   Planned Therapy Interventions (OT) ADL retraining;home program guidelines;progressive activity/exercise;cognition;IADL retraining;transfer training   Clinical Decision Making Complexity (OT) problem focused assessment/low complexity   Risk & Benefits of therapy have been explained care plan/treatment goals reviewed;evaluation/treatment results reviewed;risks/benefits reviewed;current/potential barriers reviewed;participants voiced agreement with care plan;participants included;patient;son   OT Total Evaluation Time   OT Eval, Low Complexity Minutes (83456) 8   OT Goals   Therapy Frequency (OT) 5 times/week   OT Predicted Duration/Target Date for Goal Attainment 09/29/24   OT Goals Hygiene/Grooming;Lower Body Dressing;Transfers;Toilet Transfer/Toileting;Cognition   OT: Hygiene/Grooming modified independent;while standing;within precautions   OT: Lower Body Dressing Modified independent;within precautions   OT: Transfer Modified independent;within precautions  (step in shower transfer)   OT: Toilet Transfer/Toileting Modified independent   OT: Cognitive Patient/caregiver will verbalize understanding of cognitive assessment results/recommendations as needed for safe discharge planning   Interventions   Interventions Quick Adds Self-Care/Home Management;Therapeutic Activity   Self-Care/Home Management   Self-Care/Home Mgmt/ADL, Compensatory, Meal Prep Minutes (79078) 10   Symptoms Noted During/After Treatment (Meal  Preparation/Planning Training) fatigue   Treatment Detail/Skilled Intervention Upon arrival pt agreeable to therapy. Son present and supportive. Pt's son donned post op boot. Sit to stand with CGA and cues for safety. Pt stood at sink to wash face, brush teeth, comb hair. Pt needed cue to initiate all tasks and cue for walker placement at sink. Pt doffed post op boot with modA and max cues for technique. Pt left in chair with alarm on, needed supplies   Therapeutic Activities   Therapeutic Activity Minutes (92991) 9   Symptoms noted during/after treatment fatigue   Treatment Detail/Skilled Intervention Pt HR at low 50s at rest. HR lowest was 44. HR in 50s to 60s with activity. Pt ambulated 30 ft to bathroom toilet with FWW with CGA and cues for posture due to unsteadiness. Pt ambulates slowly and is fall risk. Pt ambulated 60 ft in garvey and then to chair with CGA with FWW with cues for posture. Pt and son participated in education regarding how to have pt progress activity tolerance.   OT Discharge Planning   OT Plan Don postop ortho boot. G/H standing. Toilet transfer. LB dressing. Monitor cog   OT Discharge Recommendation (DC Rec) Transitional Care Facility   OT Rationale for DC Rec Pt requiring assist of 1 for selfcares due to impairments in dynamic standing balance, activity tolerance, cognition. Pt lives alone and needs to be independent in selfcares prior to discharge. Recommend TCU, pt in agreement   OT Brief overview of current status G/H standing at sink. ModA LB dressing   Total Session Time   Timed Code Treatment Minutes 19   Total Session Time (sum of timed and untimed services) 27

## 2024-09-15 NOTE — PROVIDER NOTIFICATION
Luciano notified VENKATESH Mistry regarding tele monitoring    Hello. per tele at 2214 pt had a 4 second pause, went into Afib then converted back to SR

## 2024-09-15 NOTE — PLAN OF CARE
"Goal Outcome Evaluation:      Plan of Care Reviewed With: patient    Overall Patient Progress: improving    Outcome Evaluation: remote tele-dago, afib and converted back to SR; Pt more confused tonight; pulled IV; redirected multiple times; zosyn; R heal WBAT, gauze/ace wrapped; boot when oob; plan TBD;      Problem: Adult Inpatient Plan of Care  Goal: Plan of Care Review  Description: The Plan of Care Review/Shift note should be completed every shift.  The Outcome Evaluation is a brief statement about your assessment that the patient is improving, declining, or no change.  This information will be displayed automatically on your shift  note.  Outcome: Progressing  Flowsheets (Taken 9/15/2024 0613)  Outcome Evaluation:   remote tele-dago, afib and converted back to SR   Pt more confused tonight   pulled IV   redirected multiple times   zosyn   R heal WBAT, gauze/ace wrapped   boot when oob   plan TBD     Plan of Care Reviewed With: patient  Overall Patient Progress: improving  Goal: Patient-Specific Goal (Individualized)  Description: You can add care plan individualizations to a care plan. Examples of Individualization might be:  \"Parent requests to be called daily at 9am for status\", \"I have a hard time hearing out of my right ear\", or \"Do not touch me to wake me up as it startles  me\".  Outcome: Progressing  Goal: Absence of Hospital-Acquired Illness or Injury  Outcome: Progressing  Intervention: Identify and Manage Fall Risk  Recent Flowsheet Documentation  Taken 9/14/2024 2019 by Celio Jones RN  Safety Promotion/Fall Prevention:   safety round/check completed   room organization consistent   patient and family education   nonskid shoes/slippers when out of bed   lighting adjusted   assistive device/personal items within reach   activity supervised   clutter free environment maintained  Intervention: Prevent Skin Injury  Recent Flowsheet Documentation  Taken 9/14/2024 2019 by Celio Jones, DIANA  Body " Position:   position changed independently   side-lying  Intervention: Prevent and Manage VTE (Venous Thromboembolism) Risk  Recent Flowsheet Documentation  Taken 9/14/2024 2019 by Celio Jones RN  VTE Prevention/Management: SCDs on (sequential compression devices)  Intervention: Prevent Infection  Recent Flowsheet Documentation  Taken 9/14/2024 2019 by Celio Jones RN  Infection Prevention:   cohorting utilized   hand hygiene promoted   rest/sleep promoted   single patient room provided  Goal: Optimal Comfort and Wellbeing  Outcome: Progressing  Intervention: Monitor Pain and Promote Comfort  Recent Flowsheet Documentation  Taken 9/14/2024 2019 by Celio Jones RN  Pain Management Interventions:   repositioned   rest  Goal: Readiness for Transition of Care  Outcome: Progressing     Problem: Infection  Goal: Absence of Infection Signs and Symptoms  Outcome: Progressing     Problem: Fall Injury Risk  Goal: Absence of Fall and Fall-Related Injury  Outcome: Progressing  Intervention: Identify and Manage Contributors  Recent Flowsheet Documentation  Taken 9/14/2024 2019 by Celio Jones RN  Medication Review/Management: medications reviewed  Intervention: Promote Injury-Free Environment  Recent Flowsheet Documentation  Taken 9/14/2024 2019 by Celio Jones RN  Safety Promotion/Fall Prevention:   safety round/check completed   room organization consistent   patient and family education   nonskid shoes/slippers when out of bed   lighting adjusted   assistive device/personal items within reach   activity supervised   clutter free environment maintained     Problem: Skin Injury Risk Increased  Goal: Skin Health and Integrity  Outcome: Progressing  Intervention: Plan: Nurse Driven Intervention: Moisture Management  Recent Flowsheet Documentation  Taken 9/14/2024 2000 by Celio Jones RN  Moisture Interventions: Encourage regular toileting  Bathing/Skin Care: electrode patches/site rotation  Intervention:  Optimize Skin Protection  Recent Flowsheet Documentation  Taken 9/15/2024 0605 by Celio Jones, RN  Activity Management:   ambulated to bathroom   back to bed  Taken 9/14/2024 2019 by Celio Jones, RN  Activity Management: activity adjusted per tolerance  Head of Bed (HOB) Positioning: HOB lowered

## 2024-09-15 NOTE — SIGNIFICANT EVENT
Significant Event Note    Notified by nursing that patient had a 4 second pause, went into atrial fibrillation briefly, and then converted back to sinus rhythm. Patient has known history of atrial fibrillation. Noted.    Chikis Vieira MD

## 2024-09-16 ENCOUNTER — APPOINTMENT (OUTPATIENT)
Dept: PHYSICAL THERAPY | Facility: CLINIC | Age: 84
DRG: 853 | End: 2024-09-16
Payer: COMMERCIAL

## 2024-09-16 LAB
ANION GAP SERPL CALCULATED.3IONS-SCNC: 10 MMOL/L (ref 7–15)
BACTERIA TISS BX CULT: ABNORMAL
BUN SERPL-MCNC: 29.3 MG/DL (ref 8–23)
CALCIUM SERPL-MCNC: 9.8 MG/DL (ref 8.8–10.4)
CHLORIDE SERPL-SCNC: 110 MMOL/L (ref 98–107)
CREAT SERPL-MCNC: 1.75 MG/DL (ref 0.67–1.17)
EGFRCR SERPLBLD CKD-EPI 2021: 38 ML/MIN/1.73M2
ERYTHROCYTE [DISTWIDTH] IN BLOOD BY AUTOMATED COUNT: 14 % (ref 10–15)
FERRITIN SERPL-MCNC: 230 NG/ML (ref 31–409)
GLUCOSE SERPL-MCNC: 84 MG/DL (ref 70–99)
HCO3 SERPL-SCNC: 19 MMOL/L (ref 22–29)
HCT VFR BLD AUTO: 35 % (ref 40–53)
HGB BLD-MCNC: 11.1 G/DL (ref 13.3–17.7)
INR PPP: 1.42 (ref 0.85–1.15)
INR PPP: 1.52 (ref 0.85–1.15)
IRON BINDING CAPACITY (ROCHE): 159 UG/DL (ref 240–430)
IRON SATN MFR SERPL: 29 % (ref 15–46)
IRON SERPL-MCNC: 46 UG/DL (ref 61–157)
MAGNESIUM SERPL-MCNC: 2.3 MG/DL (ref 1.7–2.3)
MCH RBC QN AUTO: 28.7 PG (ref 26.5–33)
MCHC RBC AUTO-ENTMCNC: 31.7 G/DL (ref 31.5–36.5)
MCV RBC AUTO: 90 FL (ref 78–100)
PLATELET # BLD AUTO: 260 10E3/UL (ref 150–450)
POTASSIUM SERPL-SCNC: 4.2 MMOL/L (ref 3.4–5.3)
RBC # BLD AUTO: 3.87 10E6/UL (ref 4.4–5.9)
SODIUM SERPL-SCNC: 139 MMOL/L (ref 135–145)
WBC # BLD AUTO: 9.6 10E3/UL (ref 4–11)

## 2024-09-16 PROCEDURE — 85027 COMPLETE CBC AUTOMATED: CPT | Performed by: INTERNAL MEDICINE

## 2024-09-16 PROCEDURE — 120N000001 HC R&B MED SURG/OB

## 2024-09-16 PROCEDURE — 97530 THERAPEUTIC ACTIVITIES: CPT | Mod: GP

## 2024-09-16 PROCEDURE — 250N000013 HC RX MED GY IP 250 OP 250 PS 637: Performed by: PODIATRIST

## 2024-09-16 PROCEDURE — 250N000013 HC RX MED GY IP 250 OP 250 PS 637: Performed by: INTERNAL MEDICINE

## 2024-09-16 PROCEDURE — 99232 SBSQ HOSP IP/OBS MODERATE 35: CPT | Performed by: INTERNAL MEDICINE

## 2024-09-16 PROCEDURE — 250N000011 HC RX IP 250 OP 636: Performed by: PODIATRIST

## 2024-09-16 PROCEDURE — F08G5YZ WOUND MANAGEMENT TREATMENT OF INTEGUMENTARY SYSTEM - LOWER BACK / LOWER EXTREMITY USING OTHER EQUIPMENT: ICD-10-PCS | Performed by: PODIATRIST

## 2024-09-16 PROCEDURE — G0463 HOSPITAL OUTPT CLINIC VISIT: HCPCS | Mod: 25

## 2024-09-16 PROCEDURE — 80048 BASIC METABOLIC PNL TOTAL CA: CPT | Performed by: INTERNAL MEDICINE

## 2024-09-16 PROCEDURE — 36415 COLL VENOUS BLD VENIPUNCTURE: CPT | Performed by: INTERNAL MEDICINE

## 2024-09-16 PROCEDURE — 85610 PROTHROMBIN TIME: CPT | Performed by: INTERNAL MEDICINE

## 2024-09-16 PROCEDURE — 83735 ASSAY OF MAGNESIUM: CPT | Performed by: STUDENT IN AN ORGANIZED HEALTH CARE EDUCATION/TRAINING PROGRAM

## 2024-09-16 PROCEDURE — 83550 IRON BINDING TEST: CPT | Performed by: INTERNAL MEDICINE

## 2024-09-16 PROCEDURE — 97116 GAIT TRAINING THERAPY: CPT | Mod: GP

## 2024-09-16 PROCEDURE — 82728 ASSAY OF FERRITIN: CPT | Performed by: INTERNAL MEDICINE

## 2024-09-16 PROCEDURE — 250N000011 HC RX IP 250 OP 636: Performed by: INTERNAL MEDICINE

## 2024-09-16 PROCEDURE — 97605 NEG PRS WND THER DME<=50SQCM: CPT

## 2024-09-16 RX ORDER — ENOXAPARIN SODIUM 100 MG/ML
80 INJECTION SUBCUTANEOUS EVERY 24 HOURS
Status: COMPLETED | OUTPATIENT
Start: 2024-09-16 | End: 2024-09-16

## 2024-09-16 RX ORDER — ENOXAPARIN SODIUM 100 MG/ML
80 INJECTION SUBCUTANEOUS EVERY 12 HOURS
Status: DISCONTINUED | OUTPATIENT
Start: 2024-09-16 | End: 2024-09-20 | Stop reason: HOSPADM

## 2024-09-16 RX ORDER — WARFARIN SODIUM 5 MG/1
5 TABLET ORAL
Status: COMPLETED | OUTPATIENT
Start: 2024-09-16 | End: 2024-09-16

## 2024-09-16 RX ADMIN — ENOXAPARIN SODIUM 80 MG: 80 INJECTION SUBCUTANEOUS at 14:29

## 2024-09-16 RX ADMIN — ROSUVASTATIN 10 MG: 10 TABLET, FILM COATED ORAL at 08:37

## 2024-09-16 RX ADMIN — PIPERACILLIN AND TAZOBACTAM 3.38 G: 3; .375 INJECTION, POWDER, FOR SOLUTION INTRAVENOUS at 20:51

## 2024-09-16 RX ADMIN — ENOXAPARIN SODIUM 80 MG: 80 INJECTION SUBCUTANEOUS at 21:01

## 2024-09-16 RX ADMIN — FAMOTIDINE 20 MG: 20 TABLET, FILM COATED ORAL at 08:37

## 2024-09-16 RX ADMIN — ACETAMINOPHEN 975 MG: 325 TABLET, FILM COATED ORAL at 02:08

## 2024-09-16 RX ADMIN — PIPERACILLIN AND TAZOBACTAM 3.38 G: 3; .375 INJECTION, POWDER, FOR SOLUTION INTRAVENOUS at 02:45

## 2024-09-16 RX ADMIN — WARFARIN SODIUM 5 MG: 5 TABLET ORAL at 17:50

## 2024-09-16 RX ADMIN — ACETAMINOPHEN 975 MG: 325 TABLET, FILM COATED ORAL at 09:59

## 2024-09-16 RX ADMIN — PIPERACILLIN AND TAZOBACTAM 3.38 G: 3; .375 INJECTION, POWDER, FOR SOLUTION INTRAVENOUS at 08:36

## 2024-09-16 RX ADMIN — ACETAMINOPHEN 975 MG: 325 TABLET, FILM COATED ORAL at 17:51

## 2024-09-16 RX ADMIN — METOPROLOL TARTRATE 12.5 MG: 25 TABLET, FILM COATED ORAL at 08:37

## 2024-09-16 RX ADMIN — SENNOSIDES AND DOCUSATE SODIUM 1 TABLET: 8.6; 5 TABLET ORAL at 20:51

## 2024-09-16 RX ADMIN — POLYETHYLENE GLYCOL 3350 17 G: 17 POWDER, FOR SOLUTION ORAL at 08:34

## 2024-09-16 RX ADMIN — SENNOSIDES AND DOCUSATE SODIUM 1 TABLET: 8.6; 5 TABLET ORAL at 08:37

## 2024-09-16 RX ADMIN — PIPERACILLIN AND TAZOBACTAM 3.38 G: 3; .375 INJECTION, POWDER, FOR SOLUTION INTRAVENOUS at 14:26

## 2024-09-16 ASSESSMENT — ACTIVITIES OF DAILY LIVING (ADL)
ADLS_ACUITY_SCORE: 36
ADLS_ACUITY_SCORE: 37
ADLS_ACUITY_SCORE: 36
ADLS_ACUITY_SCORE: 37
ADLS_ACUITY_SCORE: 36
ADLS_ACUITY_SCORE: 37
ADLS_ACUITY_SCORE: 36

## 2024-09-16 NOTE — DISCHARGE INSTRUCTIONS
Right dorsal foot wound: NPWT (wound vac) dressing changes 3x week, using standard foam and NPWT set to -125mmHg continuous.  Use Vashe to clean wound.  Use an ostomy ring to help with seal around the toes.  Protect sutured areas with dry gauze.

## 2024-09-16 NOTE — PROGRESS NOTES
"Podiatry / Foot and Ankle Surgery Progress Note    September 15, 2024    Subject: Patient was seen at bedside for right foot. Had further debridement on 9/14. States feels well. No complaints. Eating breakfast.     Objective:  Vitals: BP (!) 169/82 (BP Location: Left arm)   Pulse 66   Temp 97.5  F (36.4  C) (Temporal)   Resp 18   Ht 1.905 m (6' 3\")   Wt 98.3 kg (216 lb 12.8 oz)   SpO2 98%   BMI 27.10 kg/m    BMI= Body mass index is 27.1 kg/m .    WBC Count   Date Value Ref Range Status   09/16/2024 9.6 4.0 - 11.0 10e3/uL Final     A1C: 5.2 (9/9/2024)    General:  Patient is alert and orientated.  NAD.    Vascular:  DP and PT pulses are palpable.  No varicosities noted  CFT's < 3secs.  Skin temp is normal.     Neuro:  Light and gross touch sensation diminished to feet.    Derm:  Dressing is c/d/I. Sutures intact.  Bloody drainage noted to Kerlix roll.  Open wound to dorsal left foot measures roughly 3.0cm x 3.5cm x 1.0cm.  No further necrosis seen today.   No redness, purulent drainage or signs of acute infection noted.     Musculoskeletal:  Previously amputated right great toe and partial right 3rd toe.     Imaging: right foot post op xray -I personally reviewed images.  Partial first ray amputation through the distal first  metatarsal. Additional partial amputation of the third toe through the  distal middle phalanx. Expected postoperative soft tissue edema and  scattered foci of gas    Cultures:      3+ Streptococcus intermedius Abnormal        Assessment: 83 yr old male with chronic kidney disease, s/p partial right 1st ray and partial right 3rd toe amputations and repeat Excisional debridement right foot wound on 9/14    Plan:    -Patient seen at bedside, dressing change.   -Wound bed remains granular and viable. Incision sites intact.   -Plan for wound vac to dorsal foot today. Ultimate plan for TCU with wound vac dressing changes.   -Recommend oral antibiotics per cultures. All bone infection felt to be " resection.   -WB to heel of RLE in CAM boot   -No further surgery planned. Coumadin can be restarted if it hasn't been.   -Will sign off. Further follow up outpatient. Vocera text with questions.         Nedra Workman, LINDAM

## 2024-09-16 NOTE — PROVIDER NOTIFICATION
MD Notification    Notified Person: Cross cover MD    Notified Person Name: Chikis Mistry    Notification Date/Time: 9/16/2024 4731    Mechanism of Provider Notification: Vocera messaging    Purpose of Notification: FYI- HR sustaining in the high 30s per tele tech and is asymptomatic awake but resting in bed    Orders Received: Acknowledged and no new orders    Comments:

## 2024-09-16 NOTE — CONSULTS
Care Management Follow Up    Length of Stay (days): 6    Expected Discharge Date: 09/20/2024     Concerns to be Addressed: discharge planning     Patient plan of care discussed at interdisciplinary rounds: Yes    Anticipated Discharge Disposition: Skilled Nursing Facility, Transitional Care      Anticipated Discharge Services: None  Anticipated Discharge DME: None    Patient/family educated on Medicare website which has current facility and service quality ratings: yes  Education Provided on the Discharge Plan: Yes  Patient/Family in Agreement with the Plan: yes    Referrals Placed by CM/SW: Post Acute Facilities  Private pay costs discussed: Not applicable    Discussed  Partnership in Safe Discharge Planning  document with patient/family: No     Handoff Completed: No, handoff not indicated or clinically appropriate    Additional Information:  New care management consult placed for discharge planning/disposition. See consult on 9/11.    Plan is for patient to discharge to TCU at discharge. Referral has been sent to [facility]. Did speak with admissions at [facility] and they will continue to review and we will talk when he is closer to discharge.     Patient did have a wound vac applied and will likely discharge with it.     Next Steps: follow up with [facility]    Trudy Randhawa RN  Care Coordinator  [facility]

## 2024-09-16 NOTE — PROGRESS NOTES
Antimicrobial Stewardship Team Note    Antimicrobial Stewardship Program - A joint venture between [facility] and [facility] to optimize antibiotic management.     Patient: Gregory Zhong  MRN: [id number]  Allergies: Patient has no known allergies.    Brief Summary:   Gregory Zhong is an 83 year old male admitted on 9/9/24 with cellulitis and a foot ulcer. PMH is significant for HTN, CKD, Afib, but no hx of DM. He presented with a fever, WBC count of 24, and a lactate of 2.9. He was given 2 doses of vancomycin and was started on and continues on day 7 of zosyn.     On 9/12, he underwent a I&D with partial right foot amputation, tissue cx resulted with GPB, GPC, and streptococcus intermedius.  On 9/14, he underwent an additional I&D with tissue cultures showing NGTD. The patient's infectious markers remain stable, with WBC WNL and he is afebrile.          Active Anti-infective Medications   (From admission, onward)                 Start     Stop    09/10/24 0600  piperacillin-tazobactam  3.375 g,   Intravenous,   EVERY 6 HOURS        Sepsis, Skin and Soft Tissue Infection       --                  Assessment: Infectious foot ulcer, resolved   The patient presented with cellulitis and a foot ulcer that required amputation and two I&D. Since his repeat I&D, tissue cx have shown NGTD, he remains clinically stable, and infectious markers are WNL. Recommend discontinuing Zosyn as source control has been obtained OR de-escalating to Augmentin if longer duration of therapy needed.     Recommendations:  Discontinue Zosyn OR de-escalate to Augmentin 875mg BID if longer duration of therapy needed.       Discussed with ID Staff MD Sharifa Garay, [facility]  Ashley Carreon, PGY1 Resident     Vital Signs/Clinical Features:  Vitals         09/14 0700  09/15 0659 09/15 0700  09/16 0659 09/16 0700  09/16 1319   Most Recent      Temp ( F) 96.8 -  98.1    97.1 -  98.1      97.5     97.5 (36.4)  09/16 0826    Pulse 42 -  70    41 -  76      66     66 09/16 0826    Resp 14 -  18    16 -  20    16 -  18     16 09/16 1240    /73 -  146/75    136/69 -  159/88      169/82     169/82 09/16 0826    SpO2 (%) 92 -  98    96 -  99      98     98 09/16 0826            Labs  Estimated Creatinine Clearance: 45.2 mL/min (A) (based on SCr of 1.75 mg/dL (H)).  Recent Labs   Lab Test 09/11/24 0738 09/12/24 0611 09/13/24 0707 09/14/24  0554 09/15/24  0646 09/16/24 0627   CR 1.53* 1.68* 1.73* 1.68* 1.75* 1.75*       Recent Labs   Lab Test 09/09/24  2157 09/10/24  0812 09/11/24  0738 09/12/24  0611 09/13/24  0707 09/14/24  0554 09/15/24  0646 09/16/24 0627   WBC 24.0*   < > 19.5* 18.2* 12.6* 11.5* 10.9 9.6   ANEU 20.9*  --   --   --   --   --   --   --    ALYM 2.6  --   --   --   --   --   --   --    ROLF 0.5  --   --   --   --   --   --   --    AEOS 0.0  --   --   --   --   --   --   --    HGB 13.2*   < > 11.1* 11.1* 10.3* 11.0* 11.0* 11.1*   HCT 41.2   < > 34.4* 34.1* 32.7* 34.8* 34.5* 35.0*   MCV 91   < > 91 90 92 90 90 90      < > 183 200 195 231 255 260    < > = values in this interval not displayed.       Recent Labs   Lab Test 09/09/24 2157   BILITOTAL 0.6   ALKPHOS 116   ALBUMIN 3.9   AST 25   ALT 26       Recent Labs   Lab Test 09/09/24 2157 09/09/24 2329 09/09/24  2332 09/10/24  0243 09/10/24  0812 09/12/24  0611   LACT 2.9*  --  2.3*  --  1.2  --    CRPI  --  72.26*  --   --   --  72.41*   SED  --   --   --  14  --   --              Culture Results:  7-Day Micro Results       Procedure Component Value Units Date/Time    Anaerobic Bacterial Culture Routine [[id number]] Collected: 09/14/24 0815    Order Status: Completed Lab Status: Preliminary result Updated: 09/16/24 1031    Specimen: Tissue from Foot, Right      Culture No anaerobic organisms isolated after 2 days    Gram Stain [[id number]] Collected: 09/14/24 0815    Order Status: Completed Lab Status: Final result Updated: 09/14/24 8111     Specimen: Tissue from Foot, Right      GS Culture See corresponding culture for results     Gram Stain Result No organisms seen      4+ WBC seen      3+ PMNs Seen      4+ Red blood cells seen    Fungal or Yeast Culture Routine [[id number]] Collected: 09/14/24 0815    Order Status: Completed Lab Status: Preliminary result Updated: 09/16/24 1031    Specimen: Tissue from Foot, Right      Culture No growth after 2 days    Tissue Aerobic Bacterial Culture Routine [[id number]]  (Abnormal) Collected: 09/14/24 0815    Order Status: Completed Lab Status: Final result Updated: 09/16/24 1234    Specimen: Tissue from Foot, Right      Culture 1+ Corynebacterium species     Comment: Identification obtained by MALDI-TOF mass spectrometry research use only database. Test characteristics determined and verified by the [facility].  Susceptibilities not routinely done, refer to antibiogram to view typical susceptibility profiles       Anaerobic Bacterial Culture Routine [[id number]]  (Abnormal) Collected: 09/12/24 1231    Order Status: Completed Lab Status: Preliminary result Updated: 09/14/24 1241    Specimen: Tissue from Foot, Right      Culture No anaerobic organisms isolated after 1 day      3+ Streptococcus intermedius     Comment: Not isolated or reported on routine aerobic culture  This organism is susceptible to ampicillin, penicillin, vancomycin and the cephalosporins. If treatment is required and your patient is allergic to penicillin, contact the microbiology lab within 5 days to request susceptibility testing.       Gram Stain [[id number]]  (Abnormal) Collected: 09/12/24 1231    Order Status: Completed Lab Status: Final result Updated: 09/12/24 1617    Specimen: Tissue from Foot, Right      GS Culture See corresponding culture for results     Gram Stain Result 2+ Gram positive cocci      1+ WBC seen    Tissue Aerobic Bacterial Culture Routine [[id number]] Collected: 09/12/24 1231     Order Status: Completed Lab Status: Final result Updated: 09/14/24 0927    Specimen: Tissue from Foot, Right      Culture 2+ Normal kim    Anaerobic Bacterial Culture Routine [[id number]] Collected: 09/12/24 1200    Order Status: Completed Lab Status: Final result Updated: 09/15/24 1045    Specimen: Tissue from Toe, Right      Culture 2+ Mixed Aerobic and Anaerobic kim     Comment: No predominant organism       Gram Stain [[id number]]  (Abnormal) Collected: 09/12/24 1200    Order Status: Completed Lab Status: Final result Updated: 09/12/24 1638    Specimen: Tissue from Toe, Right      GS Culture See corresponding culture for results     Gram Stain Result 2+ Gram positive cocci      1+ Gram positive bacilli      1+ WBC seen    Tissue Aerobic Bacterial Culture Routine [[id number]] Collected: 09/12/24 1200    Order Status: Completed Lab Status: Final result Updated: 09/14/24 0933    Specimen: Tissue from Toe, Right      Culture 1+ Normal kim    MRSA MSSA PCR, Nasal Swab [[id number]] Collected: 09/10/24 1518    Order Status: Completed Lab Status: Final result Updated: 09/10/24 1929    Specimen: Swab from Nares, Bilateral      MRSA Target DNA Negative     SA Target DNA Positive    Narrative:      The [employer]  Xpert SA Nasal Complete assay performed in the [employer]  Dx System is a qualitative in vitro diagnostic test designed for rapid detection of Staphylococcus aureus (SA) and methicillin-resistant Staphylococcus aureus (MRSA) from nasal swabs in patients at risk for nasal colonization. The test utilizes automated real-time polymerase chain reaction (PCR) to detect MRSA/SA DNA. The Xpert SA Nasal Complete assay is intended to aid in the prevention and control of MRSA/SA infections in healthcare settings. The assay is not intended to diagnose, guide or monitor treatment for MRSA/SA infections, or provide results of susceptibility to methicillin. A negative result does not preclude MRSA/SA nasal  colonization.     Blood Culture Peripheral Blood [[id number]]  (Abnormal) Collected: 09/09/24 2329    Order Status: Completed Lab Status: Final result Updated: 09/15/24 0726    Specimen: Peripheral Blood      Culture Positive on the 2nd day of incubation      Corynebacterium species     Comment: 1 of 2 bottles  The recovery of this organism from a single blood culture bottle most likely represents contamination. If susceptibility testing is needed, please refer to the antibiogram or contact [facility].       Verigene GP Panel [[id number]]  (Normal) Collected: 09/09/24 2329    Order Status: Completed Lab Status: Final result Updated: 09/12/24 2249    Specimen: Peripheral Blood      Staphylococcus species Not Detected     Staphylococcus aureus Not Detected     Staphylococcus epidermidis Not Detected     Staphylococcus lugdunensis Not Detected     Enterococcus faecalis Not Detected     Enterococcus faecium Not Detected     Streptococcus species Not Detected     Streptococcus agalactiae Not Detected     Streptococcus anginosus group Not Detected     Streptococcus pneumoniae Not Detected     Streptococcus pyogenes Not Detected     Listeria species Not Detected    Narrative:      Specimen tested with Verigene multiplex, gram-positive blood culture nucleic acid test for the following targets: Staphylococcus aureus, Staphylococcus epidermidis, Staphylococcus lugdunensis, other Staphylococcus species, Enterococcus faecalis, Enterococcus faecium, Streptococcus species, Streptococcus agalactiae, Streptococcus anginosus group, Streptococcus pneumoniae, Streptococcus pyogenes, Listeria species, mecA (methicillin resistance), and Maxime/vanB (vancomycin resistance).  Final identification and antimicrobial susceptibility testing will be verified by standard methods.              Recent Labs   Lab Test 09/10/24  1024   URINEPH 6.5   NITRITE Negative   LEUKEST Negative   WBCU 2                         Imaging: XR Foot Port Right 2  Views    Result Date: 9/12/2024  EXAM: XR FOOT PORT RIGHT 2 VIEWS DATE/TIME: 9/12/2024 1:50 PM INDICATION: s/p partial amputations COMPARISON: MRI 9/10/2024     IMPRESSION: Partial first ray amputation through the distal first metatarsal. Additional partial amputation of the third toe through the distal middle phalanx. Expected postoperative soft tissue edema and scattered foci of gas.  MEKHI MARTIN DO   SYSTEM ID:  [id number]    US DAVID Doppler No Exercise    Result Date: 9/10/2024  IR DAVID US DAVID DOPPLER NO EXERCISE, 1-2 LEVELS, BILAT   9/10/2024 3:12 PM HISTORY: Right foot ulcer, evaluate for PAD COMPARISON: None. FINDINGS: Right DAVID: DP: 0.99 PT: 0.97. Left DAVID: DP: 1.01 PT: 1.10. Waveforms: Triphasic in the distal tibial arteries     IMPRESSION: Resting ankle-brachial indices are within normal limits. DAVID CRITERIA: >0.95 Normal 0.90 - 0.94 Mild 0.5 - 0.89 Moderate 0.2 - 0.49 Severe <0.2 Critical GIL CELESTE MD   SYSTEM ID:  [id number]    MR Foot Right w/o Contrast    Result Date: 9/10/2024  Exam: MRI of the right foot without contrast dated 9/10/2024. COMPARISON: None. CLINICAL HISTORY: Plantar foot ulcer with sepsis. TECHNIQUE: Multiplanar, multisequence MR imaging of the right foot was obtained using standard sequences in 3 orthogonal planes without the use of intravenous or intra-articular gadolinium and contrast. FINDINGS: There is an ulcerative defect along the plantar aspect of the distal first metatarsal. Adjacent to the ulcer, there is low T1 signal with increased T2 signal in the tibial greater than fibular sesamoid. Contrast was not administered. Mild increased T2 signal without increased T1 signal in the great toe proximal phalanx. Low T1 signal with increased T2 signal in the distal aspect of the right third toe to phalanx, adjacent to a soft tissue defect. Atrophy within the musculature about the forefoot with diffuse edema within the musculature. No discrete abscess although contrast was not  administered. There is diffuse subcutaneous soft tissue edema. No full-thickness tendon tear or tendon retraction. The Lisfranc ligament is intact.     IMPRESSION: 1. Ulcerative lesion along the plantar aspect of the distal first metatarsal. There is low T1 signal, with increased T2 signal in the adjacent sesamoids, tibial greater than fibular, MRI findings most consistent with osteomyelitis. There is subtle increased bone marrow edema in the great toe proximal phalanx without low T1 signal, presumed reactive osteitis. 2. Low T1 signal with increased T2 signal in the distal aspect of the third toe distal phalanx, MRI findings most consistent with osteomyelitis, this is adjacent to a soft tissue defect, sagittal series 4 image 9. 3. Diffuse soft tissue edema within the musculature, likely representing myositis. No discrete abscess although contrast was not administered. 4. Diffuse subcutaneous soft tissue edema along dorsal aspect of the foot, findings most consistent with cellulitis. SUGEY SKAGGS MD   SYSTEM ID:  [id number]    US Lower Extremity Venous Duplex Right    Result Date: 9/10/2024  EXAM: US LOWER EXTREMITY VENOUS DUPLEX RIGHT LOCATION: [facility] DATE: 9/10/2024 INDICATION: Leg swelling and infection COMPARISON: None. TECHNIQUE: Venous Duplex ultrasound of the right lower extremity with and without compression, augmentation and duplex. Color flow and spectral Doppler with waveform analysis performed. FINDINGS: Exam includes the common femoral, femoral, popliteal, and contralateral common femoral veins as well as segmentally visualized deep calf veins and greater saphenous vein. RIGHT: No deep vein thrombosis. No superficial thrombophlebitis. No popliteal cyst. Soft tissue edema noted in the right calf.     IMPRESSION: 1.  No deep venous thrombosis in the right lower extremity.    Fingers XR, 2-3 views, left    Result Date: 9/10/2024  EXAM: XR FINGER LEFT G/E 2 VIEWS LOCATION:   [facility] DATE: 9/10/2024 INDICATION: Post reduction COMPARISON: 09/09/2024     IMPRESSION: The dislocated middle phalanx of the left third finger has been reduced and now properly articulates at the PIP joint. No fracture. 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 Erosive osteoarthritis at the DIP joint of the fifth finger. Internal fixation screws left second metacarpal.     Fingers XR, 2-3 views, left    Result Date: 9/9/2024  EXAM: XR FINGER LEFT G/E 2 VIEWS LOCATION: [facility] DATE: 9/9/2024 INDICATION: Suspected dislocation after fall. COMPARISON: None available.     IMPRESSION: Severe ulnar and posterior subluxation of the left long finger middle phalanx at the proximal interphalangeal joint.  A definitive fracture is not identified. Attention on postreduction imaging. 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 Mild polyarticular osteoarthritis throughout the remainder of the left hand and wrist. Healed, instrumented fracture of the second metacarpal shaft.     Head CT w/o contrast    Result Date: 9/9/2024  EXAM: CT HEAD W/O CONTRAST LOCATION: [facility] DATE: 9/9/2024 INDICATION: fall, nose bloody, blood thinners COMPARISON: 7/31/2024. TECHNIQUE: Routine CT Head without IV contrast. Multiplanar reformats. Dose reduction techniques were used. FINDINGS: INTRACRANIAL CONTENTS: No intracranial hemorrhage, extraaxial collection, or mass effect.  No CT evidence of acute infarct. There is scattered low-attenuation within the periventricular and subcortical white matter consistent with diffuse small vessel ischemic disease. The ventricular system, basal cisterns and cortical sulci are consistent with volume loss. VISUALIZED ORBITS/SINUSES/MASTOIDS: No intraorbital abnormality. No paranasal sinus mucosal disease. No middle ear or mastoid effusion. BONES/SOFT TISSUES: No acute  abnormality.     IMPRESSION: 1.  No CT finding of a mass, hemorrhage or focal area suggestive of acute infarct. 2.  Stable diffuse age related changes.

## 2024-09-16 NOTE — PLAN OF CARE
"Assumed care [id number]. Alert to self only initially but A&Ox4 later in the night. Ax1 with a gait belt and walker with boot on and is WBAT to RLE. On RA. On a regular diet. PIV in R arm is saline locked. Surgical dressing to RLE in place and is CDI. CMS intact. On telemetry- SB w/prolonged QTc. Denies pain/discomfort. Plan of care ongoing.     Goal Outcome Evaluation:      Plan of Care Reviewed With: patient    Overall Patient Progress: improvingOverall Patient Progress: improving    Outcome Evaluation: Confused tonight, A&O to self only, on tele, IV abx, pain management      Problem: Adult Inpatient Plan of Care  Goal: Plan of Care Review  Description: The Plan of Care Review/Shift note should be completed every shift.  The Outcome Evaluation is a brief statement about your assessment that the patient is improving, declining, or no change.  This information will be displayed automatically on your shift  note.  Outcome: Progressing  Flowsheets (Taken 9/16/2024 0148)  Outcome Evaluation: Confused tonight, A&O to self only, on tele, IV abx, pain management  Plan of Care Reviewed With: patient  Overall Patient Progress: improving  Goal: Patient-Specific Goal (Individualized)  Description: You can add care plan individualizations to a care plan. Examples of Individualization might be:  \"Parent requests to be called daily at 9am for status\", \"I have a hard time hearing out of my right ear\", or \"Do not touch me to wake me up as it startles  me\".  Outcome: Progressing  Goal: Absence of Hospital-Acquired Illness or Injury  Outcome: Progressing  Intervention: Identify and Manage Fall Risk  Recent Flowsheet Documentation  Taken 9/15/2024 2007 by Zuleima Chen, RN  Safety Promotion/Fall Prevention:   activity supervised   lighting adjusted   nonskid shoes/slippers when out of bed   room door open   room near nurse's station   safety round/check completed  Intervention: Prevent Skin Injury  Recent Flowsheet " Documentation  Taken 9/15/2024 2007 by Zuleima Chen RN  Body Position: position changed independently  Intervention: Prevent and Manage VTE (Venous Thromboembolism) Risk  Recent Flowsheet Documentation  Taken 9/15/2024 2007 by Zuleima Chen RN  VTE Prevention/Management: SCDs on (sequential compression devices)  Intervention: Prevent Infection  Recent Flowsheet Documentation  Taken 9/15/2024 2007 by Zuleima Chen RN  Infection Prevention:   equipment surfaces disinfected   hand hygiene promoted   personal protective equipment utilized   rest/sleep promoted   single patient room provided  Goal: Optimal Comfort and Wellbeing  Outcome: Progressing  Goal: Readiness for Transition of Care  Outcome: Progressing     Problem: Infection  Goal: Absence of Infection Signs and Symptoms  Outcome: Progressing     Problem: Fall Injury Risk  Goal: Absence of Fall and Fall-Related Injury  Outcome: Progressing  Intervention: Identify and Manage Contributors  Recent Flowsheet Documentation  Taken 9/15/2024 2007 by Zuleima Chen RN  Medication Review/Management: medications reviewed  Intervention: Promote Injury-Free Environment  Recent Flowsheet Documentation  Taken 9/15/2024 2007 by Zuleima Chen RN  Safety Promotion/Fall Prevention:   activity supervised   lighting adjusted   nonskid shoes/slippers when out of bed   room door open   room near nurse's station   safety round/check completed     Problem: Skin Injury Risk Increased  Goal: Skin Health and Integrity  Outcome: Progressing  Intervention: Plan: Nurse Driven Intervention: Moisture Management  Recent Flowsheet Documentation  Taken 9/15/2024 2007 by Zuleima Chen RN  Moisture Interventions:   Encourage regular toileting   Incontinence pad  Intervention: Optimize Skin Protection  Recent Flowsheet Documentation  Taken 9/15/2024 2007 by Zuleima Chne RN  Activity Management: activity adjusted per tolerance  Head of Bed (HOB) Positioning: HOB at 20-30  degrees     Problem: Comorbidity Management  Goal: Blood Pressure in Desired Range  Outcome: Progressing  Intervention: Maintain Blood Pressure Management  Recent Flowsheet Documentation  Taken 9/15/2024 2007 by Zuleima Chen, RN  Medication Review/Management: medications reviewed

## 2024-09-16 NOTE — PROGRESS NOTES
[facility]  Hospitalist Progress Note  rIene Ramirez MD 09/16/2024    Reason for Stay (Diagnosis): cellulitis          Assessment and Plan:      Summary of Stay: Gregory Zhong is a 83 year old male with a history of  htn/hlp, AF/flutter s/p ablation chronically on warfarin, most recent echo 2020 with EF 60% with benedicto, CKD 3b with baseline creat 1.7-1.9, mild cognitive impairment, hx of bladder cancer and bilateral renal masses s/p cryoablation, chronic right foot ulcer admitted on 9/9/2024 with a mechanical fall and found to have sepsis relate to unknown Right foot/leg cellulitis around his chronic ulcer and a dislocated left 3rd finger     He lives alone in his own home, he was moving his yard waste up onto the curb when he slipped and fell,He does not think he lost consciousness.  He denies hitting his head, but he has facial abrasions.  He denies any headache.  He did have pain in his left third finger.  Denies any recent falls, but his son says that he has appeared to have some difficulty walking with the right leg.     ER VS notable for fever 100.6  Exam notable for deformed left 3rd digit and R leg/foot erythema and edema with a deep circular right plantar foot ulcer     BMP with baseline CKD with elevated K 6.1 and down to 5.2 on repeat   CBC leukocytosis 24, hgb low 13.2.  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 Repeat I&D 9/14  -MRSA swab negative  -rec;d single dose of vanco on admission, on day 7 pip-tazo.  Duration of treatment TBD  -leukocytosis is improving 24->>>10.9 today, CRP is about the same @ 72    Sepsis   Fever/leukocytosis/elevated lactate  -now resolved     Left 3rd finger dislocation   S/p reduction in the ER   Appreciate ortho input, splint in place and should be worn for 2 weeks    Falls  Peripheral neurophathy-idiopathic  PT consultation     Htn, hlp  AF/Flutter chronically on warfarin  Bradycardia   Pta regimen is metop xl 25 mg every day, isosorbide mono 60 mg every day, amlodipine 10 mg every day,  rosuvastatin 10 mg every day, and warfarin   HR dipping down into the 40's with frequent 2 second pauses   -decreased metop xl to 12.5 mg every day but still with pauses and one was 4 seconds long as he briefly went into aflutter.  Also with bradycardia into the high 30's so stopped metop all together 9/15, or had planned to but I forgot to order, stopped this am after his am dose was given-cont tele   -resumed isosorbide at 60 mg  BPs look good, will cont to hold amlodipine at this time  -back on rosuvastatin  -not clear if he'll need additional procedures or not.  His warfarin has been on hold or only intermittently given and he's on day 6 so getting to the end of comfortable zone without AC.         *enox 80 mg x 1 9/15, no more procedures planned so restarted on warfarin on 9/17 with enox overlap    CKD 3 baseline creat 1.7-1.9  Remains at baseline    Mild cognitive impairment  delirium   Did have some delirium on night of admission     Hx of bladder cancer  Bilateral renal masses  S/p cysto and cryoablation     Normocytic anemia   First noticed 7/2024, B12 wnl.  Does not appear to have been worked up further  Given progression  "will ck iron studies but suspect more likely due to chronic kidney disease      DVT Prophylaxis: Warfarin  Code Status: Full Code  Functional Status: lives alone in his own home, forgot to ask how he gets around  Diet: reg texture  [place]: not needed  Access : PIV      Medically Ready for Discharge: Anticipated Tomorrow or Wednesday      Securely message with [employer] (more info)  Text page via [employer] Paging/Directory     I updated his son at the bedside       I spent 35 minutes reviewing epic including prior labs/imaging/medical history and notes related to this encounter  In addition time was spent in interveiwing the patient, communicating with contacts, and medical decision making      Interval History (Subjective):      Feeling well, no n/v, no cp, no sob.  Minimal sensation to his feet.                     Physical Exam:      Last Vital Signs:  BP (!) 149/67 (BP Location: Left arm)   Pulse (!) 45   Temp 97  F (36.1  C) (Temporal)   Resp 13   Ht 1.905 m (6' 3\")   Wt 98.3 kg (216 lb 12.8 oz)   SpO2 100%   BMI 27.10 kg/m      I/O:  Pleasant nad looks stated age head nc/at.  Up in chair eating lunch. Lungs ctab nl effort rrr no mrg no le edema skin warm and dry no cyanosis or clubbing alert and oriented affect appropriate hughes belly s/nt/nd tolerating po.  Skin warm and dry no cyanosis or clubbing.  Right foot wrapped and in a boot            Medications:      All current medications were reviewed with changes reflected in problem list.         Data:      All new lab and imaging data was reviewed.   Labs:  Recent Labs   Lab 09/16/24  0627      POTASSIUM 4.2   CHLORIDE 110*   CO2 19*   ANIONGAP 10   GLC 84   BUN 29.3*   CR 1.75*   GFRESTIMATED 38*   DAVID 9.8     Recent Labs   Lab 09/16/24 0627   WBC 9.6   HGB 11.1*   HCT 35.0*   MCV 90        Recent Labs   Lab 09/16/24  0627 09/15/24  0646 09/14/24  0554 09/13/24  0707 09/12/24  1045   GLC 84 92 87 92 89     Recent Labs   Lab 09/09/24  2157   AST 25 "   ALT 26   ALKPHOS 116   BILITOTAL 0.6      Imaging:   Results for orders placed or performed during the hospital encounter of 09/09/24   Fingers XR, 2-3 views, left    Narrative    EXAM: XR FINGER LEFT G/E 2 VIEWS  LOCATION: [facility]  DATE: 9/9/2024    INDICATION: Suspected dislocation after fall.  COMPARISON: None available.      Impression    IMPRESSION: Severe ulnar and posterior subluxation of the left long finger middle phalanx at the proximal interphalangeal joint.  A definitive fracture is not identified. Attention on postreduction imaging.    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 Mild polyarticular osteoarthritis throughout the remainder of the left hand and wrist. Healed, instrumented fracture of the second metacarpal shaft.     Head CT w/o contrast    Narrative    EXAM: CT HEAD W/O CONTRAST  LOCATION: [facility]  DATE: 9/9/2024    INDICATION: fall, nose bloody, blood thinners  COMPARISON: 7/31/2024.  TECHNIQUE: Routine CT Head without IV contrast. Multiplanar reformats. Dose reduction techniques were used.    FINDINGS:  INTRACRANIAL CONTENTS: No intracranial hemorrhage, extraaxial collection, or mass effect.  No CT evidence of acute infarct. There is scattered low-attenuation within the periventricular and subcortical white matter consistent with diffuse small vessel   ischemic disease. The ventricular system, basal cisterns and cortical sulci are consistent with volume loss.     VISUALIZED ORBITS/SINUSES/MASTOIDS: No intraorbital abnormality. No paranasal sinus mucosal disease. No middle ear or mastoid effusion.    BONES/SOFT TISSUES: No acute abnormality.      Impression    IMPRESSION:  1.  No CT finding of a mass, hemorrhage or focal area suggestive of acute infarct.  2.  Stable diffuse age related changes.   US Lower Extremity Venous Duplex Right    Narrative    EXAM: US LOWER EXTREMITY VENOUS DUPLEX  RIGHT  LOCATION: [facility]  DATE: 9/10/2024    INDICATION: Leg swelling and infection  COMPARISON: None.  TECHNIQUE: Venous Duplex ultrasound of the right lower extremity with and without compression, augmentation and duplex. Color flow and spectral Doppler with waveform analysis performed.    FINDINGS: Exam includes the common femoral, femoral, popliteal, and contralateral common femoral veins as well as segmentally visualized deep calf veins and greater saphenous vein.     RIGHT: No deep vein thrombosis. No superficial thrombophlebitis. No popliteal cyst. Soft tissue edema noted in the right calf.      Impression    IMPRESSION:  1.  No deep venous thrombosis in the right lower extremity.   Fingers XR, 2-3 views, left    Narrative    EXAM: XR FINGER LEFT G/E 2 VIEWS  LOCATION: [facility]  DATE: 9/10/2024    INDICATION: Post reduction  COMPARISON: 09/09/2024      Impression    IMPRESSION: The dislocated middle phalanx of the left third finger has been reduced and now properly articulates at the PIP joint. No fracture. 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 Erosive osteoarthritis at the DIP joint of   the fifth finger. Internal fixation screws left second metacarpal.     MR Foot Right w/o Contrast    Narrative    Exam: MRI of the right foot without contrast dated 9/10/2024.    COMPARISON: None.    CLINICAL HISTORY: Plantar foot ulcer with sepsis.    TECHNIQUE: Multiplanar, multisequence MR imaging of the right foot was  obtained using standard sequences in 3 orthogonal planes without the  use of intravenous or intra-articular gadolinium and contrast.    FINDINGS:    There is an ulcerative defect along the plantar aspect of the distal  first metatarsal. Adjacent to the ulcer, there is low T1 signal with  increased T2 signal in the tibial greater than fibular sesamoid.  Contrast was not administered. Mild increased T2 signal without  increased  T1 signal in the great toe proximal phalanx.    Low T1 signal with increased T2 signal in the distal aspect of the  right third toe to phalanx, adjacent to a soft tissue defect.    Atrophy within the musculature about the forefoot with diffuse edema  within the musculature. No discrete abscess although contrast was not  administered. There is diffuse subcutaneous soft tissue edema.    No full-thickness tendon tear or tendon retraction. The Lisfranc  ligament is intact.      Impression    IMPRESSION:  1. Ulcerative lesion along the plantar aspect of the distal first  metatarsal. There is low T1 signal, with increased T2 signal in the  adjacent sesamoids, tibial greater than fibular, MRI findings most  consistent with osteomyelitis. There is subtle increased bone marrow  edema in the great toe proximal phalanx without low T1 signal,  presumed reactive osteitis.    2. Low T1 signal with increased T2 signal in the distal aspect of the  third toe distal phalanx, MRI findings most consistent with  osteomyelitis, this is adjacent to a soft tissue defect, sagittal  series 4 image 9.    3. Diffuse soft tissue edema within the musculature, likely  representing myositis. No discrete abscess although contrast was not  administered.    4. Diffuse subcutaneous soft tissue edema along dorsal aspect of the  foot, findings most consistent with cellulitis.    SUGEY SKAGGS MD         SYSTEM ID:  [id number]   US DAVID Doppler No Exercise    Narrative    IR DAVID US DAVID DOPPLER NO EXERCISE, 1-2 LEVELS, BILAT   9/10/2024 3:12  PM     HISTORY: Right foot ulcer, evaluate for PAD    COMPARISON: None.    FINDINGS:  Right DAVID:   DP: 0.99  PT: 0.97.    Left DAVID:   DP: 1.01   PT: 1.10.    Waveforms: Triphasic in the distal tibial arteries      Impression    IMPRESSION: Resting ankle-brachial indices are within normal limits.    DAVID CRITERIA:  >0.95 Normal  0.90 - 0.94 Mild  0.5 - 0.89 Moderate  0.2 - 0.49 Severe  <0.2 Lotus CELESTE  MD         SYSTEM ID:  [id number]   XR Foot Port Right 2 Views    Narrative    EXAM: XR FOOT PORT RIGHT 2 VIEWS  DATE/TIME: 9/12/2024 1:50 PM    INDICATION: s/p partial amputations  COMPARISON: MRI 9/10/2024      Impression    IMPRESSION: Partial first ray amputation through the distal first  metatarsal. Additional partial amputation of the third toe through the  distal middle phalanx. Expected postoperative soft tissue edema and  scattered foci of gas.       MEKHI MARTIN DO         SYSTEM ID:  [id number]

## 2024-09-16 NOTE — PLAN OF CARE
"Afebrile.  No pain.  No nausea.  Wound vac applied by WOC, suction continuous at -125mmHg.  Denies N/T.  Drsg CDI.  Voiding, LBM today.  Up A1 belt + walker, RLE heel WB with boot on.  TCU.       Goal Outcome Evaluation:    Plan of Care Reviewed With: patient, family    Overall Patient Progress: improvingOverall Patient Progress: improving    Outcome Evaluation: No pain.  Wound vac on.  TCU pending.    Problem: Adult Inpatient Plan of Care  Goal: Plan of Care Review  Description: The Plan of Care Review/Shift note should be completed every shift.  The Outcome Evaluation is a brief statement about your assessment that the patient is improving, declining, or no change.  This information will be displayed automatically on your shift  note.  Outcome: Progressing  Flowsheets (Taken 9/16/2024 8899)  Outcome Evaluation: No pain.  Wound vac on.  TCU pending.  Plan of Care Reviewed With:   patient   family  Overall Patient Progress: improving  Goal: Patient-Specific Goal (Individualized)  Description: You can add care plan individualizations to a care plan. Examples of Individualization might be:  \"Parent requests to be called daily at 9am for status\", \"I have a hard time hearing out of my right ear\", or \"Do not touch me to wake me up as it startles  me\".  Outcome: Progressing  Goal: Absence of Hospital-Acquired Illness or Injury  Outcome: Progressing  Intervention: Prevent Skin Injury  Recent Flowsheet Documentation  Taken 9/16/2024 0959 by Brian Franklin RN  Body Position: legs elevated  Goal: Optimal Comfort and Wellbeing  Outcome: Progressing  Goal: Readiness for Transition of Care  Outcome: Progressing     Problem: Infection  Goal: Absence of Infection Signs and Symptoms  Outcome: Progressing     Problem: Fall Injury Risk  Goal: Absence of Fall and Fall-Related Injury  Outcome: Progressing     Problem: Skin Injury Risk Increased  Goal: Skin Health and Integrity  Outcome: Progressing  Intervention: Optimize Skin " Protection  Recent Flowsheet Documentation  Taken 9/16/2024 1751 by Brian Franklin, RN  Activity Management: up in chair  Taken 9/16/2024 1430 by Brian Franklin, RN  Activity Management:   up in chair   ambulated to bathroom   ambulated in room  Taken 9/16/2024 1402 by Brian Franklin, RN  Activity Management: up in chair  Taken 9/16/2024 1240 by Brian Franklin, RN  Activity Management:   ambulated to bathroom   ambulated in room   back to bed  Taken 9/16/2024 0959 by Brian Franklin, RN  Activity Management: up in chair     Problem: Comorbidity Management  Goal: Blood Pressure in Desired Range  Outcome: Progressing

## 2024-09-16 NOTE — CONSULTS
"SPIRITUAL HEALTH SERVICES - Consult Note  [facility] Orth[facility]/Sp[facility]  Referral Source/Reason for Visit: [facility] consult.    Summary and Recommendations -  Pt Gregory shared that he is moving into a new residency tomorrow.  He named his two sons, their families, friends, and his [employer] as being part of his support network.  Gregory is [employer] and affiliated with [employer] in [place].  He declined my offer to contact his [employer].    Plan: No further plans as pt anticipates discharging tomorrow.     Bartolo Hwang M.Div., [facility]  Staff     SHS available 24/7 for emergent requests/referrals, either by paging the on-call  or by entering an ASAP/STAT consult in [facility], which will also page the on-call .    Assessment    Saw pt Gregory EMERY Trevin per [facility] consult to assess spiritual and emotional needs because patient responded \"Yes\" to the question in the admission assessment, \"Do you have any spiritual or Christianity beliefs that will affect your care?\"      Patient/Family Understanding of Illness and Goals of Care - Gregory reported that he had a procedure done on his foot and that he will discharge tomorrow.    Distress and Loss - He denied having any immediate concerns beyond \"the next steps\" in the direction of his care.    Strengths, Coping, and Resources -   Gregory named his two sons, their families (he has seven grandchildren), friends, and [employer] as being part of his support network  He reported that he is moving tomorrow into a residency that has assisted living.  Gregory acknowledged a mix of excitement and fear about the move.  He enjoys going out with friends and attending his grandchildren's activities.    Meaning, Beliefs, and Spirituality - Gregory is [employer] and affiliated with a [employer] in [place]. He participates in several groups at [employer], such as a Bible study group and a men's group.  Gregory welcomed prayer.   "

## 2024-09-16 NOTE — CONSULTS
[facility]  WO Nurse Inpatient Assessment     Consulted for:  Right foot vac application    Summary:  Pt is s/p I&Ds and partial amputations to right foot, with small gaping wound area remaining to dorsal foot.  Wound vac applied today 9/16 without issue.      Patient History (according to provider note(s):      Summary of Stay: Gregory Zhong is a 83 year old male with a history of htn/hlp, AF/flutter s/p ablation chronically on warfarin, most recent echo 2020 with EF 60% with benedicto, CKD 3b with baseline creat 1.7-1.9, mild cognitive impairment, hx of bladder cancer and bilateral renal masses s/p cryoablation, chronic right foot ulcer admitted on 9/9/2024 with a mechanical fall and found to have sepsis relate to unknown Right foot/leg cellulitis around his chronic ulcer and a dislocated left 3rd finger     Areas Assessed:      Areas visualized during today's visit: RLE    Negative pressure wound therapy applied to: Right dorsal foot     Last photo: 9-16-24 9-16-24 plantar/medial foot       Wound due to: Surgical Wound  Wound history/plan of care: chronic ulcers per report, hx idiopathic neuropathy  Surgical date: 9-14-24 Dr. Almendarez DPM   Service following: [facility], Podiatry  Date Negative Pressure Wound Therapy initiated: 9-16-24   Interventions in place: offloading and elevation  Is patient s nutritional status compromised? no   If yes, what interventions are in place? N/A  Reason for initiating vac therapy?  High risk of infections, and Need for accelerated granulation tissue  Which?of?the?following?co-morbidities?apply? N/A  If diabetic is patient on a diabetic management program? N/A   Is osteomyelitis present in wound? no   If yes what treatments are in place? N/A  Wound base: moist red tissue     Palpation of the wound bed: normal      Drainage: moderate     Description of drainage: serosanguinous     Measurements (length x width x depth, in cm): approx 3 x 3 x 2cm     Tunneling: N/A      "Undermining: N/A  Periwound skin: mostly intact, several areas of sutures adjacent to wound      Color: pink      Temperature: normal   Odor: none  Pain: mild, tender  Pain interventions prior to dressing change: slow and gentle cares   Treatment goal: Heal , Drainage control, Infection control/prevention, and Increase granulation  STATUS: initial assessment  Supplies ordered: at bedside       Number of foam pieces removed from a wound (excluding foam for bridge) : n/a   Verified this matched the number of foam pieces applied last dressing change: N/A   Number of foam pieces packed into wound (excluding foam for bridge) : 1 GranJosueoam Black      Treatment Plan:     Negative pressure wound therapy plan:  Wound location: Right dorsal foot   Change Days: Mon/Wed/Fri by WOC RN    Supplies (including all accessories) used: small  Black foam   Cleanse with Vashe prior to replacing NPWT  Suction setting: -125   Methods used: Bridged trac pad off bony prominences and Placed barrier ring into periwound creases to improve seal    Staff RN to assess integrity of dressing and ensure suction is set at appropriate level every shift.   Date canister. Chart canister output every shift. Change cannister weekly and PRN if full/occluded     Remove foam dressing and replace with BID normal saline moist gauze dressing if:   -a dressing failure which cannot be repaired within 2 hours   -patient is discharging to home without a home pump   -patient is discharging to a facility outside the local area   -if a dressing is a \"Silver Foam\", remove before Radiation Therapy or MRI     The hospital VAC pump is not to be discharged with the patient.?Ensure to disconnect patient from machine prior to discharge. Then,    - If a home KCI VAC pump has been delivered, connect home cannister to dressing tubing then connect cannister to home pump and turn on machine    - If transferring to a nearby facility with a KCI vac, can disconnect and clamp tubing " then cover end with glove so can be reconnected within 2 hours        Orders: Written    RECOMMEND PRIMARY TEAM ORDER: None, at this time  Education provided: plan of care  Discussed plan of care with: Patient, Family, and Nurse  [facility] nurse follow-up plan: Monday/WednesdayFriday  Notify WOC if wound(s) deteriorate.  Nursing to notify the Provider(s) and re-consult the [facility] Nurse if new skin concern.    DATA:     Current support surface: Standard  Standard gel mattress (Isoflex)  Containment of urine/stool:  not assessed  BMI: Body mass index is 27.1 kg/m .   Active diet order: Orders Placed This Encounter      Regular Diet Adult     Output: I/O last 3 completed shifts:  In: 960 [P.O.:960]  Out: 2950 [Urine:2950]     Labs:   Recent Labs   Lab 09/16/24  0627 09/09/24  2329 09/09/24  2157   ALBUMIN  --   --  3.9   HGB 11.1*   < > 13.2*   INR 1.52*   < >  --    WBC 9.6   < > 24.0*   A1C  --   --  5.2    < > = values in this interval not displayed.     Pressure injury risk assessment:   Sensory Perception: 3-->slightly limited  Moisture: 4-->rarely moist  Activity: 3-->walks occasionally  Mobility: 3-->slightly limited  Nutrition: 3-->adequate  Friction and Shear: 3-->no apparent problem  Ty Score: 19    Shanell Mars RN CWOCN  -Securely message with [employer] ([facility] [employer])  Preferred  -[facility] Office Phone: [phone] (messages checked periodically Mon-Fri 8a-4p)

## 2024-09-17 ENCOUNTER — APPOINTMENT (OUTPATIENT)
Dept: OCCUPATIONAL THERAPY | Facility: CLINIC | Age: 84
DRG: 853 | End: 2024-09-17
Payer: COMMERCIAL

## 2024-09-17 ENCOUNTER — APPOINTMENT (OUTPATIENT)
Dept: GENERAL RADIOLOGY | Facility: CLINIC | Age: 84
DRG: 853 | End: 2024-09-17
Attending: INTERNAL MEDICINE
Payer: COMMERCIAL

## 2024-09-17 ENCOUNTER — APPOINTMENT (OUTPATIENT)
Dept: CT IMAGING | Facility: CLINIC | Age: 84
DRG: 853 | End: 2024-09-17
Attending: INTERNAL MEDICINE
Payer: COMMERCIAL

## 2024-09-17 ENCOUNTER — APPOINTMENT (OUTPATIENT)
Dept: PHYSICAL THERAPY | Facility: CLINIC | Age: 84
DRG: 853 | End: 2024-09-17
Payer: COMMERCIAL

## 2024-09-17 LAB
ANION GAP SERPL CALCULATED.3IONS-SCNC: 14 MMOL/L (ref 7–15)
BUN SERPL-MCNC: 24.4 MG/DL (ref 8–23)
CALCIUM SERPL-MCNC: 9.8 MG/DL (ref 8.8–10.4)
CHLORIDE SERPL-SCNC: 106 MMOL/L (ref 98–107)
CREAT SERPL-MCNC: 1.54 MG/DL (ref 0.67–1.17)
EGFRCR SERPLBLD CKD-EPI 2021: 45 ML/MIN/1.73M2
GLUCOSE SERPL-MCNC: 138 MG/DL (ref 70–99)
HCO3 SERPL-SCNC: 19 MMOL/L (ref 22–29)
HGB BLD-MCNC: 12.3 G/DL (ref 13.3–17.7)
INR PPP: 1.56 (ref 0.85–1.15)
PATH REPORT.COMMENTS IMP SPEC: NORMAL
PATH REPORT.COMMENTS IMP SPEC: NORMAL
PATH REPORT.FINAL DX SPEC: NORMAL
PATH REPORT.GROSS SPEC: NORMAL
PATH REPORT.MICROSCOPIC SPEC OTHER STN: NORMAL
PATH REPORT.RELEVANT HX SPEC: NORMAL
PHOTO IMAGE: NORMAL
POTASSIUM SERPL-SCNC: 4.4 MMOL/L (ref 3.4–5.3)
SODIUM SERPL-SCNC: 139 MMOL/L (ref 135–145)

## 2024-09-17 PROCEDURE — 97530 THERAPEUTIC ACTIVITIES: CPT | Mod: GP

## 2024-09-17 PROCEDURE — 250N000013 HC RX MED GY IP 250 OP 250 PS 637: Performed by: PODIATRIST

## 2024-09-17 PROCEDURE — 85018 HEMOGLOBIN: CPT | Performed by: STUDENT IN AN ORGANIZED HEALTH CARE EDUCATION/TRAINING PROGRAM

## 2024-09-17 PROCEDURE — 250N000011 HC RX IP 250 OP 636: Performed by: PODIATRIST

## 2024-09-17 PROCEDURE — 120N000001 HC R&B MED SURG/OB

## 2024-09-17 PROCEDURE — 73502 X-RAY EXAM HIP UNI 2-3 VIEWS: CPT

## 2024-09-17 PROCEDURE — 80048 BASIC METABOLIC PNL TOTAL CA: CPT | Performed by: STUDENT IN AN ORGANIZED HEALTH CARE EDUCATION/TRAINING PROGRAM

## 2024-09-17 PROCEDURE — 250N000013 HC RX MED GY IP 250 OP 250 PS 637: Performed by: INTERNAL MEDICINE

## 2024-09-17 PROCEDURE — 73700 CT LOWER EXTREMITY W/O DYE: CPT | Mod: LT

## 2024-09-17 PROCEDURE — 97535 SELF CARE MNGMENT TRAINING: CPT | Mod: GO | Performed by: OCCUPATIONAL THERAPIST

## 2024-09-17 PROCEDURE — 36415 COLL VENOUS BLD VENIPUNCTURE: CPT | Performed by: STUDENT IN AN ORGANIZED HEALTH CARE EDUCATION/TRAINING PROGRAM

## 2024-09-17 PROCEDURE — 99233 SBSQ HOSP IP/OBS HIGH 50: CPT | Performed by: INTERNAL MEDICINE

## 2024-09-17 PROCEDURE — 250N000011 HC RX IP 250 OP 636: Performed by: INTERNAL MEDICINE

## 2024-09-17 PROCEDURE — 85610 PROTHROMBIN TIME: CPT | Performed by: INTERNAL MEDICINE

## 2024-09-17 PROCEDURE — 97116 GAIT TRAINING THERAPY: CPT | Mod: GP

## 2024-09-17 PROCEDURE — 36415 COLL VENOUS BLD VENIPUNCTURE: CPT | Performed by: INTERNAL MEDICINE

## 2024-09-17 RX ORDER — WARFARIN SODIUM 5 MG/1
5 TABLET ORAL
Status: DISCONTINUED | OUTPATIENT
Start: 2024-09-17 | End: 2024-09-17

## 2024-09-17 RX ORDER — POLYETHYLENE GLYCOL 3350 17 G/17G
85 POWDER, FOR SOLUTION ORAL ONCE
Status: COMPLETED | OUTPATIENT
Start: 2024-09-17 | End: 2024-09-17

## 2024-09-17 RX ORDER — WARFARIN SODIUM 5 MG/1
5 TABLET ORAL ONCE
Status: DISCONTINUED | OUTPATIENT
Start: 2024-09-17 | End: 2024-09-18

## 2024-09-17 RX ORDER — CYCLOBENZAPRINE HCL 5 MG
5 TABLET ORAL EVERY 8 HOURS PRN
Status: DISCONTINUED | OUTPATIENT
Start: 2024-09-17 | End: 2024-09-20 | Stop reason: HOSPADM

## 2024-09-17 RX ORDER — MULTIVITAMIN,THERAPEUTIC
1 TABLET ORAL DAILY
Status: DISCONTINUED | OUTPATIENT
Start: 2024-09-18 | End: 2024-09-20 | Stop reason: HOSPADM

## 2024-09-17 RX ADMIN — FAMOTIDINE 20 MG: 20 TABLET, FILM COATED ORAL at 08:28

## 2024-09-17 RX ADMIN — ACETAMINOPHEN 975 MG: 325 TABLET, FILM COATED ORAL at 02:15

## 2024-09-17 RX ADMIN — ONDANSETRON 4 MG: 2 INJECTION INTRAMUSCULAR; INTRAVENOUS at 21:59

## 2024-09-17 RX ADMIN — AMOXICILLIN AND CLAVULANATE POTASSIUM 1 TABLET: 875; 125 TABLET, FILM COATED ORAL at 20:51

## 2024-09-17 RX ADMIN — SENNOSIDES AND DOCUSATE SODIUM 1 TABLET: 8.6; 5 TABLET ORAL at 20:51

## 2024-09-17 RX ADMIN — SENNOSIDES AND DOCUSATE SODIUM 1 TABLET: 8.6; 5 TABLET ORAL at 08:28

## 2024-09-17 RX ADMIN — OXYCODONE HYDROCHLORIDE 2.5 MG: 5 TABLET ORAL at 20:50

## 2024-09-17 RX ADMIN — ENOXAPARIN SODIUM 80 MG: 80 INJECTION SUBCUTANEOUS at 10:04

## 2024-09-17 RX ADMIN — OXYCODONE HYDROCHLORIDE 2.5 MG: 5 TABLET ORAL at 15:16

## 2024-09-17 RX ADMIN — PIPERACILLIN AND TAZOBACTAM 3.38 G: 3; .375 INJECTION, POWDER, FOR SOLUTION INTRAVENOUS at 08:28

## 2024-09-17 RX ADMIN — POLYETHYLENE GLYCOL 3350 17 G: 17 POWDER, FOR SOLUTION ORAL at 08:27

## 2024-09-17 RX ADMIN — PIPERACILLIN AND TAZOBACTAM 3.38 G: 3; .375 INJECTION, POWDER, FOR SOLUTION INTRAVENOUS at 14:04

## 2024-09-17 RX ADMIN — PIPERACILLIN AND TAZOBACTAM 3.38 G: 3; .375 INJECTION, POWDER, FOR SOLUTION INTRAVENOUS at 02:15

## 2024-09-17 RX ADMIN — CYCLOBENZAPRINE HYDROCHLORIDE 5 MG: 5 TABLET, FILM COATED ORAL at 14:39

## 2024-09-17 RX ADMIN — ROSUVASTATIN 10 MG: 10 TABLET, FILM COATED ORAL at 08:28

## 2024-09-17 RX ADMIN — ACETAMINOPHEN 650 MG: 325 TABLET, FILM COATED ORAL at 14:39

## 2024-09-17 RX ADMIN — POLYETHYLENE GLYCOL 3350 85 G: 17 POWDER, FOR SOLUTION ORAL at 14:54

## 2024-09-17 ASSESSMENT — ACTIVITIES OF DAILY LIVING (ADL)
ADLS_ACUITY_SCORE: 35
ADLS_ACUITY_SCORE: 37
ADLS_ACUITY_SCORE: 35
ADLS_ACUITY_SCORE: 34
ADLS_ACUITY_SCORE: 33
ADLS_ACUITY_SCORE: 34
ADLS_ACUITY_SCORE: 37
ADLS_ACUITY_SCORE: 33
ADLS_ACUITY_SCORE: 35
ADLS_ACUITY_SCORE: 37
ADLS_ACUITY_SCORE: 35
ADLS_ACUITY_SCORE: 37
ADLS_ACUITY_SCORE: 33

## 2024-09-17 NOTE — PLAN OF CARE
"Goal Outcome Evaluation:      Plan of Care Reviewed With: patient    Overall Patient Progress: improvingOverall Patient Progress: improving    Outcome Evaluation: Pt AOx4; intermittently confused. Tele - Afib 48 per tele tech. Had episodes of pauses between 2.6secs - 3.secs; MD aware. Wound vac maintained. Denies pain. Ax1 GB/walker; heel WB w/CAM boot. Discharge to TCU; date and time TBD.      Problem: Adult Inpatient Plan of Care  Goal: Plan of Care Review  Description: The Plan of Care Review/Shift note should be completed every shift.  The Outcome Evaluation is a brief statement about your assessment that the patient is improving, declining, or no change.  This information will be displayed automatically on your shift  note.  Outcome: Progressing  Flowsheets (Taken 9/17/2024 0537)  Outcome Evaluation:   Pt AOx4   intermittently confused. Tele - Afib 48 per tele tech. Had episodes of pauses between 2.6secs - 3.secs   MD aware. Wound vac maintained. Denies pain. Ax1 GB/walker   heel WB w/CAM boot. Discharge to TCU   date and time TBD.  Plan of Care Reviewed With: patient  Overall Patient Progress: improving  Goal: Patient-Specific Goal (Individualized)  Description: You can add care plan individualizations to a care plan. Examples of Individualization might be:  \"Parent requests to be called daily at 9am for status\", \"I have a hard time hearing out of my right ear\", or \"Do not touch me to wake me up as it startles  me\".  Outcome: Progressing  Goal: Absence of Hospital-Acquired Illness or Injury  Outcome: Progressing  Intervention: Identify and Manage Fall Risk  Recent Flowsheet Documentation  Taken 9/16/2024 2014 by Elena Alejo RN  Safety Promotion/Fall Prevention:   activity supervised   assistive device/personal items within reach   clutter free environment maintained   mobility aid in reach   nonskid shoes/slippers when out of bed   room near nurse's station   safety round/check " completed  Intervention: Prevent Skin Injury  Recent Flowsheet Documentation  Taken 9/17/2024 0047 by Elena Alejo RN  Body Position:   supine, legs elevated   supine, head elevated  Taken 9/16/2024 2014 by Elena Alejo RN  Body Position:   supine, legs elevated   supine, head elevated  Skin Protection:   adhesive use limited   incontinence pads utilized  Device Skin Pressure Protection:   absorbent pad utilized/changed   adhesive use limited  Intervention: Prevent and Manage VTE (Venous Thromboembolism) Risk  Recent Flowsheet Documentation  Taken 9/16/2024 2014 by Elena Alejo RN  VTE Prevention/Management: SCDs on (sequential compression devices)  Intervention: Prevent Infection  Recent Flowsheet Documentation  Taken 9/16/2024 2014 by Elena Alejo RN  Infection Prevention:   single patient room provided   rest/sleep promoted   hand hygiene promoted  Goal: Optimal Comfort and Wellbeing  Outcome: Progressing  Goal: Readiness for Transition of Care  Outcome: Progressing     Problem: Infection  Goal: Absence of Infection Signs and Symptoms  Outcome: Progressing  Intervention: Prevent or Manage Infection  Recent Flowsheet Documentation  Taken 9/16/2024 2014 by Elena Alejo RN  Infection Management: aseptic technique maintained     Problem: Fall Injury Risk  Goal: Absence of Fall and Fall-Related Injury  Outcome: Progressing  Intervention: Identify and Manage Contributors  Recent Flowsheet Documentation  Taken 9/16/2024 2014 by Elena Alejo RN  Self-Care Promotion:   independence encouraged   BADL personal objects within reach  Medication Review/Management: medications reviewed  Intervention: Promote Injury-Free Environment  Recent Flowsheet Documentation  Taken 9/16/2024 2014 by Elena Alejo RN  Safety Promotion/Fall Prevention:   activity supervised   assistive device/personal items within reach   clutter free environment maintained    mobility aid in reach   nonskid shoes/slippers when out of bed   room near nurse's station   safety round/check completed     Problem: Skin Injury Risk Increased  Goal: Skin Health and Integrity  Outcome: Progressing  Intervention: Plan: Nurse Driven Intervention: Moisture Management  Recent Flowsheet Documentation  Taken 9/17/2024 0047 by Elena Alejo RN  Bathing/Skin Care: dressed/undressed  Taken 9/16/2024 2014 by Elena Alejo RN  Moisture Interventions:   Encourage regular toileting   Incontinence pad  Bathing/Skin Care: dressed/undressed  Intervention: Optimize Skin Protection  Recent Flowsheet Documentation  Taken 9/17/2024 0047 by Elena Alejo RN  Activity Management:   ambulated to bathroom   back to bed  Head of Bed (HOB) Positioning: HOB at 20-30 degrees  Taken 9/16/2024 2014 by Elena Alejo RN  Pressure Reduction Techniques:   frequent weight shift encouraged   heels elevated off bed  Pressure Reduction Devices: positioning supports utilized  Skin Protection:   adhesive use limited   incontinence pads utilized  Activity Management: activity adjusted per tolerance  Head of Bed (HOB) Positioning: HOB at 20-30 degrees     Problem: Comorbidity Management  Goal: Blood Pressure in Desired Range  Outcome: Progressing  Intervention: Maintain Blood Pressure Management  Recent Flowsheet Documentation  Taken 9/16/2024 2014 by Elena Alejo RN  Medication Review/Management: medications reviewed

## 2024-09-17 NOTE — PROVIDER NOTIFICATION
Notified Person: Betzy Ott     Notified date/time: 9/16/2024, 21:13    Purpose of Notification: Pt had 5 pauses within 15mins, between 2.0 - 2.2 secs.     Orders received: reorder Mg in the AM and notify if less than 2.0. Also notify if pause goes over 3secs.

## 2024-09-17 NOTE — PROGRESS NOTES
[facility]  Hospitalist Progress Note  Irene Ramirez MD 09/17/2024    Reason for Stay (Diagnosis): cellulitis          Assessment and Plan:      Summary of Stay: Gregory Zhong is a 83 year old male with a history of  htn/hlp, AF/flutter s/p ablation chronically on warfarin, most recent echo 2020 with EF 60% with benedicto, CKD 3b with baseline creat 1.7-1.9, mild cognitive impairment, hx of bladder cancer and bilateral renal masses s/p cryoablation, chronic right foot ulcer admitted on 9/9/2024 with a mechanical fall and found to have sepsis relate to unknown Right foot/leg cellulitis around his chronic ulcer and a dislocated left 3rd finger     He lives alone in his own home, he was moving his yard waste up onto the curb when he slipped and fell,He does not think he lost consciousness.  He denies hitting his head, but he has facial abrasions.  He denies any headache.  He did have pain in his left third finger.  Denies any recent falls, but his son says that he has appeared to have some difficulty walking with the right leg.     ER VS notable for fever 100.6  Exam notable for deformed left 3rd digit and R leg/foot erythema and edema with a deep circular right plantar foot ulcer     BMP with baseline CKD with elevated K 6.1 and down to 5.2 on repeat   CBC leukocytosis 24, hgb low 13.2.  Lactic acid 2.9->2.3->1.2 with IVF   INR supra-therapeutic at 4.0    L finger XR with dislocated 3rd digit    L finger dislocation reduced in the ER and splinted   He was admitted empirically on  pip-tazo/vanco-> pip-tazo alone   Underwent I and D with right foot with partial 1st ray amputation and partial 3rd amputation 9/12, additional I&D 9/14.  Now with wound vac in place    Hospital course has been plagued by bradycardia and pauses in the setting of BB therapy which has now been weaned to off.     On 9/17 had significant left hip pain where his HR jumped to the 140's.  No antecedent trauma was just trying to pull himself up  in a chair (although did slip out of his recliner and was found on the floor 2 evenings ago).  Suspect MSK injury       Problem List:   Right leg/foot cellulitis  Chronic right plantar ulcer of unclear duration   Idiopathic Peripheral neuropathy with impaired sensation   -Blood culture positive on 2nd day of incubation-corynebacterium consistent with contaminant   -Underwent I and D with right foot with partial 1st ray amputation and partial 3rd amputation 9/12, culture results with 1+ GPB, 2+ GPC. Repeat I&D 9/14  -wound cultures: strep intermedius, shaalia odontolytica (prior actinomycetes), and most recent with corynebacterium   -MRSA swab negative  -rec;d single dose of vanco on admission, on day 7 pip-tazo.  Duration of treatment TBD  -leukocytosis is improving 24->>>9.8    Sepsis   Fever/leukocytosis/elevated lactate  -now resolved     Left 3rd finger dislocation   S/p reduction in the ER   Appreciate ortho input, splint in place and should be worn for 2 weeks so can be removed on the 23rd or 24th     Falls  Peripheral neurophathy-idiopathic  PT consultation     Left hip pain  Hurts with lifting and with flexion but not with internal or external rotation.  I checked an XRay showing  : Linear lucency through the left intertrochanteric femur seen only on the AP view, which may represent a nondisplaced fracture and could be further evaluated by CT so will ck CT this evening   Ordered prn muscle relaxant     Htn, hlp  AF/Flutter chronically on warfarin  Bradycardia   Pta regimen is metop xl 25 mg every day, isosorbide mono 60 mg every day, amlodipine 10 mg every day,  rosuvastatin 10 mg every day, and warfarin   HR dipping down into the 40's with frequent 2 second pauses   -decreased metop xl to 12.5 mg every day but still with pauses and one was 4 seconds long as he briefly went into aflutter.  Also with bradycardia into the high 30's so stopped metop all together 9/15, or had planned to but I forgot to order,  stopped this am after his am dose was given-cont tele   -resumed isosorbide at 60 mg  BPs look good, will cont to hold amlodipine at this time  -back on rosuvastatin  -not clear if he'll need additional procedures or not.  His warfarin has been on hold or only intermittently given and he's on day 6 so getting to the end of comfortable zone without AC.         *enox 80 mg x 1 9/15, no more procedures planned so restarted on warfarin on 9/17 with enox overlap    CKD 3 baseline creat 1.7-1.9  Remains at baseline    Mild cognitive impairment  delirium   Did have some delirium on night of admission and tends to have some mild confusion in the afternoon.  No significant delirium, no need for medications for agitation     Hx of bladder cancer  Bilateral renal masses  S/p cysto and cryoablation     Normocytic anemia   First noticed 7/2024, B12 wnl.  Does not appear to have been worked up further  Given progression will ck iron studies but suspect more likely due to chronic kidney disease      DVT Prophylaxis: Warfarin  Code Status: Full Code  Functional Status: lives alone in his own home, forgot to ask how he gets around  Diet: reg texture  [place]: not needed  Access : PIV      Medically Ready for Discharge: Anticipated Tomorrow or Thursday, depends on what we find with the CT of his hip      Securely message with [employer] (more info)  Text page via [employer] Paging/Directory     I updated his son at the bedside       I spent 50 minutes reviewing epic including prior labs/imaging/medical history and notes related to this encounter  In addition time was spent in interveiwing the patient, communicating with contacts, and medical decision making      Interval History (Subjective):      Was well this morning when I spoke to him but then was called with severe onset of left LQ pain.  Pain came on while he was scooting up in his recliner and then intensified when he stepped on it.  It's fine when he's just laying in bed                  "    Physical Exam:      Last Vital Signs:  BP (!) 150/74 (BP Location: Left arm)   Pulse 63   Temp 97.8  F (36.6  C) (Temporal)   Resp 20   Ht 1.905 m (6' 3\")   Wt 98.3 kg (216 lb 12.8 oz)   SpO2 100%   BMI 27.10 kg/m      I/O:  Pleasant nad looks stated age head nc/at.  Up in chair eating lunch. And when I see him again he is laying in bed.  I mashed on his llq and did not recreate the pain.  It seemed to hurt along the lateral aspect of the hip Lungs ctab nl effort rrr no mrg no le edema skin warm and dry no cyanosis or clubbing alert and oriented affect appropriate hughes belly s/nt/nd tolerating po.  Skin warm and dry no cyanosis or clubbing.  Right foot wrapped and in a boot            Medications:      All current medications were reviewed with changes reflected in problem list.         Data:      All new lab and imaging data was reviewed.   Labs:  Recent Labs   Lab 09/16/24  0627      POTASSIUM 4.2   CHLORIDE 110*   CO2 19*   ANIONGAP 10   GLC 84   BUN 29.3*   CR 1.75*   GFRESTIMATED 38*   DAVID 9.8     Recent Labs   Lab 09/16/24 0627   WBC 9.6   HGB 11.1*   HCT 35.0*   MCV 90        Recent Labs   Lab 09/16/24  0627 09/15/24  0646 09/14/24  0554 09/13/24  0707 09/12/24  1045   GLC 84 92 87 92 89     No results for input(s): \"AST\", \"ALT\", \"GGT\", \"ALKPHOS\", \"BILITOTAL\", \"BILICONJ\", \"BILIDIRECT\", \"TALA\" in the last 168 hours.    Invalid input(s): \"BILIRUBININDIRECT\"     Imaging:   Results for orders placed or performed during the hospital encounter of 09/09/24   Fingers XR, 2-3 views, left    Narrative    EXAM: XR FINGER LEFT G/E 2 VIEWS  LOCATION: [facility]  DATE: 9/9/2024    INDICATION: Suspected dislocation after fall.  COMPARISON: None available.      Impression    IMPRESSION: Severe ulnar and posterior subluxation of the left long finger middle phalanx at the proximal interphalangeal joint.  A definitive fracture is not identified. Attention on postreduction " imaging.    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 Mild polyarticular osteoarthritis throughout the remainder of the left hand and wrist. Healed, instrumented fracture of the second metacarpal shaft.     Head CT w/o contrast    Narrative    EXAM: CT HEAD W/O CONTRAST  LOCATION: [facility]  DATE: 9/9/2024    INDICATION: fall, nose bloody, blood thinners  COMPARISON: 7/31/2024.  TECHNIQUE: Routine CT Head without IV contrast. Multiplanar reformats. Dose reduction techniques were used.    FINDINGS:  INTRACRANIAL CONTENTS: No intracranial hemorrhage, extraaxial collection, or mass effect.  No CT evidence of acute infarct. There is scattered low-attenuation within the periventricular and subcortical white matter consistent with diffuse small vessel   ischemic disease. The ventricular system, basal cisterns and cortical sulci are consistent with volume loss.     VISUALIZED ORBITS/SINUSES/MASTOIDS: No intraorbital abnormality. No paranasal sinus mucosal disease. No middle ear or mastoid effusion.    BONES/SOFT TISSUES: No acute abnormality.      Impression    IMPRESSION:  1.  No CT finding of a mass, hemorrhage or focal area suggestive of acute infarct.  2.  Stable diffuse age related changes.   US Lower Extremity Venous Duplex Right    Narrative    EXAM: US LOWER EXTREMITY VENOUS DUPLEX RIGHT  LOCATION: [facility]  DATE: 9/10/2024    INDICATION: Leg swelling and infection  COMPARISON: None.  TECHNIQUE: Venous Duplex ultrasound of the right lower extremity with and without compression, augmentation and duplex. Color flow and spectral Doppler with waveform analysis performed.    FINDINGS: Exam includes the common femoral, femoral, popliteal, and contralateral common femoral veins as well as segmentally visualized deep calf veins and greater saphenous vein.     RIGHT: No deep vein thrombosis. No superficial thrombophlebitis. No  popliteal cyst. Soft tissue edema noted in the right calf.      Impression    IMPRESSION:  1.  No deep venous thrombosis in the right lower extremity.   Fingers XR, 2-3 views, left    Narrative    EXAM: XR FINGER LEFT G/E 2 VIEWS  LOCATION: [facility]  DATE: 9/10/2024    INDICATION: Post reduction  COMPARISON: 09/09/2024      Impression    IMPRESSION: The dislocated middle phalanx of the left third finger has been reduced and now properly articulates at the PIP joint. No fracture. 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 Erosive osteoarthritis at the DIP joint of   the fifth finger. Internal fixation screws left second metacarpal.     MR Foot Right w/o Contrast    Narrative    Exam: MRI of the right foot without contrast dated 9/10/2024.    COMPARISON: None.    CLINICAL HISTORY: Plantar foot ulcer with sepsis.    TECHNIQUE: Multiplanar, multisequence MR imaging of the right foot was  obtained using standard sequences in 3 orthogonal planes without the  use of intravenous or intra-articular gadolinium and contrast.    FINDINGS:    There is an ulcerative defect along the plantar aspect of the distal  first metatarsal. Adjacent to the ulcer, there is low T1 signal with  increased T2 signal in the tibial greater than fibular sesamoid.  Contrast was not administered. Mild increased T2 signal without  increased T1 signal in the great toe proximal phalanx.    Low T1 signal with increased T2 signal in the distal aspect of the  right third toe to phalanx, adjacent to a soft tissue defect.    Atrophy within the musculature about the forefoot with diffuse edema  within the musculature. No discrete abscess although contrast was not  administered. There is diffuse subcutaneous soft tissue edema.    No full-thickness tendon tear or tendon retraction. The Lisfranc  ligament is intact.      Impression    IMPRESSION:  1. Ulcerative lesion along the plantar aspect of the distal  first  metatarsal. There is low T1 signal, with increased T2 signal in the  adjacent sesamoids, tibial greater than fibular, MRI findings most  consistent with osteomyelitis. There is subtle increased bone marrow  edema in the great toe proximal phalanx without low T1 signal,  presumed reactive osteitis.    2. Low T1 signal with increased T2 signal in the distal aspect of the  third toe distal phalanx, MRI findings most consistent with  osteomyelitis, this is adjacent to a soft tissue defect, sagittal  series 4 image 9.    3. Diffuse soft tissue edema within the musculature, likely  representing myositis. No discrete abscess although contrast was not  administered.    4. Diffuse subcutaneous soft tissue edema along dorsal aspect of the  foot, findings most consistent with cellulitis.    SUGEY SKAGGS MD         SYSTEM ID:  [id number]   US DAVID Doppler No Exercise    Narrative    IR DAVID US DAVID DOPPLER NO EXERCISE, 1-2 LEVELS, BILAT   9/10/2024 3:12  PM     HISTORY: Right foot ulcer, evaluate for PAD    COMPARISON: None.    FINDINGS:  Right DAVID:   DP: 0.99  PT: 0.97.    Left DAVID:   DP: 1.01   PT: 1.10.    Waveforms: Triphasic in the distal tibial arteries      Impression    IMPRESSION: Resting ankle-brachial indices are within normal limits.    DAVID CRITERIA:  >0.95 Normal  0.90 - 0.94 Mild  0.5 - 0.89 Moderate  0.2 - 0.49 Severe  <0.2 Critical    GIL CELESTE MD         SYSTEM ID:  [id number]   XR Foot Port Right 2 Views    Narrative    EXAM: XR FOOT PORT RIGHT 2 VIEWS  DATE/TIME: 9/12/2024 1:50 PM    INDICATION: s/p partial amputations  COMPARISON: MRI 9/10/2024      Impression    IMPRESSION: Partial first ray amputation through the distal first  metatarsal. Additional partial amputation of the third toe through the  distal middle phalanx. Expected postoperative soft tissue edema and  scattered foci of gas.       MEKHI MARTIN DO         SYSTEM ID:  [id number]

## 2024-09-17 NOTE — PROGRESS NOTES
Care Management Follow Up    Length of Stay (days): 7    Expected Discharge Date: 09/20/2024     Concerns to be Addressed: discharge planning     Patient plan of care discussed at interdisciplinary rounds: Yes    Anticipated Discharge Disposition: Skilled Nursing Facility, Transitional Care     Anticipated Discharge Services: None  Anticipated Discharge DME: None    Patient/family educated on Medicare website which has current facility and service quality ratings: yes  Education Provided on the Discharge Plan: Yes  Patient/Family in Agreement with the Plan: yes    Referrals Placed by CM/SW: Post Acute Facilities  Private pay costs discussed: Not applicable    Discussed  Partnership in Safe Discharge Planning  document with patient/family: No     Handoff Completed: No, handoff not indicated or clinically appropriate    Additional Information:  CM left several messages with admissions at [facility]. Awaiting call back. Did talk to patient and son at bedside and obtained other TCU choices in the event that [facility] is unable to accept. They agreed to have referrals sent to [facility] and [facility].     Referrals sent. Will continue to follow.     Next Steps: Follow up on TCU referrals    Trudy Randhawa RN  Care Coordinator  [facility]

## 2024-09-17 NOTE — PROGRESS NOTES
CLINICAL NUTRITION SERVICES  -  ASSESSMENT NOTE    Recommendations Ordered by Registered Dietitian (RD):   - Ordered ensure with dinner for patient to trial. MVI/M to support wound healing.   - encourage protein at every meal to support wound healing     MALNUTRITION:  % Weight Loss:  Weight loss does not meet criteria for malnutrition   % Intake:  Decreased intake does not meet criteria for malnutrition   Subcutaneous Fat Loss:  None observed -- mild losses appropriate for aging   Muscle Loss:  Temporal region mild depletion, Clavicle bone region mild depletion, and Acromion bone region mild depletion  Fluid Retention:  1-2+ edema    Malnutrition Diagnosis: Patient does not meet two of the above criteria necessary for diagnosing malnutrition     REASON FOR ASSESSMENT  Gregory Zhong is a 83 year old male seen by Registered Dietitian for LOS.     Gregory Zhong admitted admitted on 9/9/2024 with a mechanical fall and found to have sepsis relate to unknown Right foot/leg cellulitis around his chronic ulcer and a dislocated left 3rd finger.   - Underwent I and D with right foot with partial 1st ray amputation and partial 3rd amputation 9/12, additional I&D 9/14     PMH: htn/hlp, AF/flutter s/p ablation chronically on warfarin, most recent echo 2020 with EF 60% with benedicto, CKD 3b with baseline creat 1.7-1.9, mild cognitive impairment, hx of bladder cancer and bilateral renal masses s/p cryoablation, chronic right foot ulcer       NUTRITION HISTORY  Information obtained from patient and chart review.   - Lives alone.   - Met with patient at bedside with son. Pt stated he was trying to lose weight up until last summer, since then he thinks he has been maintaining his weight . He denies issues with eating or appetite PTA.     Food Allergies/Intolerances: NKFA    CURRENT NUTRITION ORDERS  Diet Order:  Regular     Current Intake/Tolerance:   - Per [facility]: has been consistently ordering 2-3 meals per day   - Per Flowsheet:  "% intakes  - Per pt report: Eating has been going well until today. Today he has no appetite he thinks r/t constipation.   - RD d/w pt the importance of adequate protein for wound healing, reviewed protein sources. Also discussed foods that contain probiotics to support his gut after antibiotics.     Labs: reviewed    Medications: reviewed; miralax, senokot    Skin: 1-2+ edema    I/Os: 2 BM noted 9/16      ANTHROPOMETRICS  Height: 6' 3\"  Weight: 216 lbs 12.8 oz  Body mass index is 27.1 kg/m .  IBW: 196 lbs  %IBW: 110%  Weight History:   Wt Readings from Last 10 Encounters:   09/10/24 98.3 kg (216 lb 12.8 oz)   - Per chart review, pt's wt has decreased 6.9% since last year.       ASSESSED NUTRITION NEEDS PER APPROVED PRACTICE GUIDELINES:  Dosing Weight 98.3 kg  Estimated Energy Needs: 20-25 Kcal/Kg  Justification: maintenance  Estimated Protein Needs: 1-1.2 g pro/Kg  Justification: CKD and post-op healing  Estimated Fluid Needs: 1 mL/Kcal  Justification: maintenance OR per provider pending fluid status      NUTRITION DIAGNOSIS:  Increased nutrient needs related to recent surgery as evidenced by need for more protein to support wound healing.       NUTRITION INTERVENTIONS  Recommendations / Nutrition Prescription  See above.       Implementation  Nutrition education: as above  Medical Food Supplement and Multivitamin/Mineral      Nutrition Goals  Consume % of nutritionally adequate meals/supplements TID       MONITORING AND EVALUATION:  Progress towards goals will be monitored and evaluated per protocol and Practice Guidelines      Sera Emerson, MS, RD, LD  Clinical Dietitian  3rd floor/ICU: [phone]  All other floors: [phone]  Weekend/holiday: [phone]  Office: [phone]    "

## 2024-09-18 ENCOUNTER — APPOINTMENT (OUTPATIENT)
Dept: GENERAL RADIOLOGY | Facility: CLINIC | Age: 84
DRG: 853 | End: 2024-09-18
Attending: INTERNAL MEDICINE
Payer: COMMERCIAL

## 2024-09-18 LAB
ALBUMIN UR-MCNC: 20 MG/DL
ANION GAP SERPL CALCULATED.3IONS-SCNC: 15 MMOL/L (ref 7–15)
APPEARANCE UR: CLEAR
BILIRUB UR QL STRIP: NEGATIVE
BUN SERPL-MCNC: 25.2 MG/DL (ref 8–23)
CALCIUM SERPL-MCNC: 10.2 MG/DL (ref 8.8–10.4)
CHLORIDE SERPL-SCNC: 107 MMOL/L (ref 98–107)
COLOR UR AUTO: YELLOW
CREAT SERPL-MCNC: 1.64 MG/DL (ref 0.67–1.17)
EGFRCR SERPLBLD CKD-EPI 2021: 42 ML/MIN/1.73M2
ERYTHROCYTE [DISTWIDTH] IN BLOOD BY AUTOMATED COUNT: 14 % (ref 10–15)
ERYTHROCYTE [DISTWIDTH] IN BLOOD BY AUTOMATED COUNT: 14.3 % (ref 10–15)
GLUCOSE SERPL-MCNC: 130 MG/DL (ref 70–99)
GLUCOSE UR STRIP-MCNC: NEGATIVE MG/DL
HCO3 SERPL-SCNC: 18 MMOL/L (ref 22–29)
HCT VFR BLD AUTO: 31.5 % (ref 40–53)
HCT VFR BLD AUTO: 33.6 % (ref 40–53)
HGB BLD-MCNC: 10.6 G/DL (ref 13.3–17.7)
HGB BLD-MCNC: 10.8 G/DL (ref 13.3–17.7)
HGB BLD-MCNC: 11.1 G/DL (ref 13.3–17.7)
HGB BLD-MCNC: 11.6 G/DL (ref 13.3–17.7)
HGB BLD-MCNC: 9.9 G/DL (ref 13.3–17.7)
HGB UR QL STRIP: NEGATIVE
HYALINE CASTS: 2 /LPF
INR PPP: 1.5 (ref 0.85–1.15)
KETONES UR STRIP-MCNC: NEGATIVE MG/DL
LEUKOCYTE ESTERASE UR QL STRIP: NEGATIVE
MCH RBC QN AUTO: 28.6 PG (ref 26.5–33)
MCH RBC QN AUTO: 29.4 PG (ref 26.5–33)
MCHC RBC AUTO-ENTMCNC: 31.4 G/DL (ref 31.5–36.5)
MCHC RBC AUTO-ENTMCNC: 33 G/DL (ref 31.5–36.5)
MCV RBC AUTO: 89 FL (ref 78–100)
MCV RBC AUTO: 91 FL (ref 78–100)
MUCOUS THREADS #/AREA URNS LPF: PRESENT /LPF
NITRATE UR QL: NEGATIVE
PH UR STRIP: 6 [PH] (ref 5–7)
PLATELET # BLD AUTO: 374 10E3/UL (ref 150–450)
PLATELET # BLD AUTO: 479 10E3/UL (ref 150–450)
POTASSIUM SERPL-SCNC: 4.2 MMOL/L (ref 3.4–5.3)
RBC # BLD AUTO: 3.46 10E6/UL (ref 4.4–5.9)
RBC # BLD AUTO: 3.77 10E6/UL (ref 4.4–5.9)
RBC URINE: 1 /HPF
SODIUM SERPL-SCNC: 140 MMOL/L (ref 135–145)
SP GR UR STRIP: 1.03 (ref 1–1.03)
UROBILINOGEN UR STRIP-MCNC: NORMAL MG/DL
WBC # BLD AUTO: 21.5 10E3/UL (ref 4–11)
WBC # BLD AUTO: 24.9 10E3/UL (ref 4–11)
WBC URINE: 1 /HPF

## 2024-09-18 PROCEDURE — 120N000001 HC R&B MED SURG/OB

## 2024-09-18 PROCEDURE — 82374 ASSAY BLOOD CARBON DIOXIDE: CPT | Performed by: INTERNAL MEDICINE

## 2024-09-18 PROCEDURE — 85018 HEMOGLOBIN: CPT | Performed by: STUDENT IN AN ORGANIZED HEALTH CARE EDUCATION/TRAINING PROGRAM

## 2024-09-18 PROCEDURE — 250N000013 HC RX MED GY IP 250 OP 250 PS 637: Performed by: PODIATRIST

## 2024-09-18 PROCEDURE — 85027 COMPLETE CBC AUTOMATED: CPT | Performed by: STUDENT IN AN ORGANIZED HEALTH CARE EDUCATION/TRAINING PROGRAM

## 2024-09-18 PROCEDURE — 81001 URINALYSIS AUTO W/SCOPE: CPT | Performed by: INTERNAL MEDICINE

## 2024-09-18 PROCEDURE — 250N000013 HC RX MED GY IP 250 OP 250 PS 637: Performed by: INTERNAL MEDICINE

## 2024-09-18 PROCEDURE — 71045 X-RAY EXAM CHEST 1 VIEW: CPT

## 2024-09-18 PROCEDURE — 258N000003 HC RX IP 258 OP 636: Performed by: INTERNAL MEDICINE

## 2024-09-18 PROCEDURE — 85018 HEMOGLOBIN: CPT | Performed by: INTERNAL MEDICINE

## 2024-09-18 PROCEDURE — 85610 PROTHROMBIN TIME: CPT | Performed by: INTERNAL MEDICINE

## 2024-09-18 PROCEDURE — 36415 COLL VENOUS BLD VENIPUNCTURE: CPT | Performed by: INTERNAL MEDICINE

## 2024-09-18 PROCEDURE — 36415 COLL VENOUS BLD VENIPUNCTURE: CPT | Performed by: STUDENT IN AN ORGANIZED HEALTH CARE EDUCATION/TRAINING PROGRAM

## 2024-09-18 PROCEDURE — 99233 SBSQ HOSP IP/OBS HIGH 50: CPT | Performed by: INTERNAL MEDICINE

## 2024-09-18 PROCEDURE — 82565 ASSAY OF CREATININE: CPT | Performed by: INTERNAL MEDICINE

## 2024-09-18 PROCEDURE — 85049 AUTOMATED PLATELET COUNT: CPT | Performed by: INTERNAL MEDICINE

## 2024-09-18 RX ADMIN — SENNOSIDES AND DOCUSATE SODIUM 1 TABLET: 8.6; 5 TABLET ORAL at 19:14

## 2024-09-18 RX ADMIN — SODIUM CHLORIDE 500 ML: 9 INJECTION, SOLUTION INTRAVENOUS at 12:38

## 2024-09-18 RX ADMIN — FAMOTIDINE 20 MG: 20 TABLET, FILM COATED ORAL at 09:26

## 2024-09-18 RX ADMIN — ROSUVASTATIN 10 MG: 10 TABLET, FILM COATED ORAL at 09:26

## 2024-09-18 RX ADMIN — SENNOSIDES AND DOCUSATE SODIUM 1 TABLET: 8.6; 5 TABLET ORAL at 09:26

## 2024-09-18 RX ADMIN — THERA TABS 1 TABLET: TAB at 10:57

## 2024-09-18 RX ADMIN — AMOXICILLIN AND CLAVULANATE POTASSIUM 1 TABLET: 875; 125 TABLET, FILM COATED ORAL at 09:26

## 2024-09-18 RX ADMIN — POLYETHYLENE GLYCOL 3350 17 G: 17 POWDER, FOR SOLUTION ORAL at 09:26

## 2024-09-18 RX ADMIN — OXYCODONE HYDROCHLORIDE 2.5 MG: 5 TABLET ORAL at 02:24

## 2024-09-18 RX ADMIN — CYCLOBENZAPRINE HYDROCHLORIDE 5 MG: 5 TABLET, FILM COATED ORAL at 00:36

## 2024-09-18 RX ADMIN — AMOXICILLIN AND CLAVULANATE POTASSIUM 1 TABLET: 875; 125 TABLET, FILM COATED ORAL at 19:14

## 2024-09-18 RX ADMIN — ACETAMINOPHEN 650 MG: 325 TABLET, FILM COATED ORAL at 00:36

## 2024-09-18 ASSESSMENT — ACTIVITIES OF DAILY LIVING (ADL)
ADLS_ACUITY_SCORE: 44
ADLS_ACUITY_SCORE: 33
ADLS_ACUITY_SCORE: 40
ADLS_ACUITY_SCORE: 33
ADLS_ACUITY_SCORE: 33
ADLS_ACUITY_SCORE: 34
ADLS_ACUITY_SCORE: 33
ADLS_ACUITY_SCORE: 40
ADLS_ACUITY_SCORE: 45
ADLS_ACUITY_SCORE: 44
ADLS_ACUITY_SCORE: 45
ADLS_ACUITY_SCORE: 44
ADLS_ACUITY_SCORE: 45
ADLS_ACUITY_SCORE: 33
ADLS_ACUITY_SCORE: 40
ADLS_ACUITY_SCORE: 44
ADLS_ACUITY_SCORE: 45
ADLS_ACUITY_SCORE: 40
ADLS_ACUITY_SCORE: 44

## 2024-09-18 NOTE — PLAN OF CARE
"Goal Outcome Evaluation:      Plan of Care Reviewed With: patient, family    Overall Patient Progress: no changeOverall Patient Progress: no change    Outcome Evaluation: Left hip CT done. Serial Hgb's.        Diet: Reg  Mental Status:  Alert, confused   O2: RA  Pain: PRN Tylenol, flexeril, oxy   Mobility: not OOB   LDA's: right foot surgical site with wound vac -125 cont   Consults: WOC  Other Cares: Tele monitoring   GI/: Emesis x 2, clear, PRN Zofran admin with relief, constipation, hypo BS,   Discharge Disposition: TCU   Discharge Time: TBD/ pending      Problem: Adult Inpatient Plan of Care  Goal: Plan of Care Review  Description: The Plan of Care Review/Shift note should be completed every shift.  The Outcome Evaluation is a brief statement about your assessment that the patient is improving, declining, or no change.  This information will be displayed automatically on your shift  note.  Outcome: Not Progressing  Flowsheets (Taken 9/18/2024 0315)  Outcome Evaluation: Left hip CT done. Serial Hgb's.  Plan of Care Reviewed With:   patient   family  Overall Patient Progress: no change  Goal: Patient-Specific Goal (Individualized)  Description: You can add care plan individualizations to a care plan. Examples of Individualization might be:  \"Parent requests to be called daily at 9am for status\", \"I have a hard time hearing out of my right ear\", or \"Do not touch me to wake me up as it startles  me\".  Outcome: Not Progressing  Goal: Absence of Hospital-Acquired Illness or Injury  Outcome: Not Progressing  Intervention: Identify and Manage Fall Risk  Recent Flowsheet Documentation  Taken 9/17/2024 2200 by Sujata Parker RN  Safety Promotion/Fall Prevention:   assistive device/personal items within reach   safety round/check completed  Intervention: Prevent Skin Injury  Recent Flowsheet Documentation  Taken 9/17/2024 2200 by Sujata Parker, RN  Skin Protection: adhesive use limited  Device Skin " Pressure Protection: absorbent pad utilized/changed  Goal: Optimal Comfort and Wellbeing  Outcome: Not Progressing  Intervention: Monitor Pain and Promote Comfort  Recent Flowsheet Documentation  Taken 9/17/2024 2050 by Sujata Parker RN  Pain Management Interventions: medication (see MAR)  Goal: Readiness for Transition of Care  Outcome: Not Progressing     Problem: Infection  Goal: Absence of Infection Signs and Symptoms  Outcome: Not Progressing     Problem: Fall Injury Risk  Goal: Absence of Fall and Fall-Related Injury  Outcome: Not Progressing  Intervention: Identify and Manage Contributors  Recent Flowsheet Documentation  Taken 9/17/2024 2200 by Sujata Parker RN  Medication Review/Management:   medications reviewed   high-risk medications identified  Intervention: Promote Injury-Free Environment  Recent Flowsheet Documentation  Taken 9/17/2024 2200 by Sujata Parker RN  Safety Promotion/Fall Prevention:   assistive device/personal items within reach   safety round/check completed     Problem: Skin Injury Risk Increased  Goal: Skin Health and Integrity  Outcome: Not Progressing  Intervention: Plan: Nurse Driven Intervention: Moisture Management  Recent Flowsheet Documentation  Taken 9/17/2024 2200 by Sujata Parker RN  Moisture Interventions: Incontinence pad  Intervention: Plan: Nurse Driven Intervention: Friction and Shear  Recent Flowsheet Documentation  Taken 9/17/2024 2200 by Sujata Parker RN  Friction/Shear Interventions: HOB 30 degrees or less  Intervention: Optimize Skin Protection  Recent Flowsheet Documentation  Taken 9/17/2024 2200 by Sujata Parker RN  Pressure Reduction Techniques:   weight shift assistance provided   frequent weight shift encouraged   positioned off wounds   heels elevated off bed  Pressure Reduction Devices: positioning supports utilized  Skin Protection: adhesive use limited     Problem: Comorbidity Management  Goal:  Blood Pressure in Desired Range  Outcome: Not Progressing  Intervention: Maintain Blood Pressure Management  Recent Flowsheet Documentation  Taken 9/17/2024 2200 by Sujata Parker, RN  Medication Review/Management:   medications reviewed   high-risk medications identified

## 2024-09-18 NOTE — PROGRESS NOTES
Cross cover note    CT of the right hip without contrast reviewed.  Partially imaged moderate retroperitoneal hematoma seen within the psoas muscle.  CT with IV contrast was suggested but I do not think that will change the management.  Will check hemoglobin stat and monitor every 6 hours x 3 and if it is dropping, then CT may be reasonable to obtain to check for ongoing bleed.  For now, will hold off on getting CT with IV contrast due to CKD.  Will continue to hold Lovenox and warfarin.    Patient also had transversely directed sacral fracture at S5 C1 levels, which likely needs nonoperative treatment.      Celestino Avila MD  Internal Medicine Hospitalist

## 2024-09-18 NOTE — PLAN OF CARE
"Pt w/ intermittent confusion to time & situation. Rates pain 2/10. Up w/ Ax2-pt attempted to sit @ edge of bed x2 today but became light headed, BP dropping to 70's systolic. Bolus started this afternoon. Pt unable to void this shift- straight cathed for 600 ml, U/A sent. Tolerating regular diet. Plan is TCU @ discharge.     Problem: Infection  Goal: Absence of Infection Signs and Symptoms  Outcome: Not Progressing  Intervention: Prevent or Manage Infection  Recent Flowsheet Documentation  Taken 9/18/2024 1000 by Maru Lan RN  Infection Management: aseptic technique maintained     Problem: Comorbidity Management  Goal: Blood Pressure in Desired Range  Outcome: Not Progressing  Intervention: Maintain Blood Pressure Management  Recent Flowsheet Documentation  Taken 9/18/2024 1000 by Maru Lan RN  Medication Review/Management: medications reviewed     Problem: Adult Inpatient Plan of Care  Goal: Plan of Care Review  Description: The Plan of Care Review/Shift note should be completed every shift.  The Outcome Evaluation is a brief statement about your assessment that the patient is improving, declining, or no change.  This information will be displayed automatically on your shift  note.  Outcome: Progressing  Flowsheets (Taken 9/18/2024 1416)  Plan of Care Reviewed With:   patient   child  Overall Patient Progress: declining  Goal: Patient-Specific Goal (Individualized)  Description: You can add care plan individualizations to a care plan. Examples of Individualization might be:  \"Parent requests to be called daily at 9am for status\", \"I have a hard time hearing out of my right ear\", or \"Do not touch me to wake me up as it startles  me\".  Outcome: Progressing  Goal: Absence of Hospital-Acquired Illness or Injury  Outcome: Progressing  Intervention: Identify and Manage Fall Risk  Recent Flowsheet Documentation  Taken 9/18/2024 1000 by Maru Lan RN  Safety Promotion/Fall Prevention:   activity " supervised   assistive device/personal items within reach   nonskid shoes/slippers when out of bed   safety round/check completed  Intervention: Prevent Skin Injury  Recent Flowsheet Documentation  Taken 9/18/2024 1000 by Maru Lan RN  Device Skin Pressure Protection: positioning supports utilized  Intervention: Prevent and Manage VTE (Venous Thromboembolism) Risk  Recent Flowsheet Documentation  Taken 9/18/2024 1000 by Maru Lan RN  VTE Prevention/Management: SCDs off (sequential compression devices)  Goal: Optimal Comfort and Wellbeing  Outcome: Progressing  Intervention: Monitor Pain and Promote Comfort  Recent Flowsheet Documentation  Taken 9/18/2024 0900 by Maru Lan RN  Pain Management Interventions: declines  Goal: Readiness for Transition of Care  Outcome: Progressing     Problem: Fall Injury Risk  Goal: Absence of Fall and Fall-Related Injury  Outcome: Progressing  Intervention: Identify and Manage Contributors  Recent Flowsheet Documentation  Taken 9/18/2024 1000 by Maru Lan RN  Medication Review/Management: medications reviewed  Intervention: Promote Injury-Free Environment  Recent Flowsheet Documentation  Taken 9/18/2024 1000 by Maru Lan RN  Safety Promotion/Fall Prevention:   activity supervised   assistive device/personal items within reach   nonskid shoes/slippers when out of bed   safety round/check completed     Problem: Skin Injury Risk Increased  Goal: Skin Health and Integrity  Outcome: Progressing  Intervention: Optimize Skin Protection  Recent Flowsheet Documentation  Taken 9/18/2024 1000 by Maru Lan RN  Pressure Reduction Techniques: frequent weight shift encouraged   Goal Outcome Evaluation:      Plan of Care Reviewed With: patient, child    Overall Patient Progress: decliningOverall Patient Progress: declining

## 2024-09-18 NOTE — PROGRESS NOTES
Hospitalist Medicine Progress Note   [facility]       Gregory Zhong is a 83 year old gentleman who lives alone and is home with hypertension, hypercholesteremia, atrial fibrillation/flutter s/p ablation chronically on warfarin therapy stage III CKD with baseline creatinine around 1.7-1.9, mild cognitive impairment, history of bladder cancer bilateral renal masses s/p cryoablation, chronic right foot ulceration was admitted to [facility] 9/9/2024 with a mechanical fall when he slipped and fell without loss of consciousness and was found to have sepsis related to unknown right foot/leg cellulitis around his chronic ulceration and dislocation of the third left finger.  His potassium was elevated at 6.1 creatinine was 1.66 calcium was elevated at 10.6 CRP was 72.4.  Left finger dislocation was reduced in the emergency department and a splint was applied.  He was started on Zosyn.  On 9/12/2024 he underwent right foot partial first ray resection, 3rd digit amputation, with excision dry debridement down to and including tendon of the right foot by Nedra Workman DPM.  On 9/14/2024 patient had right foot excision and debridement for the ulceration of the right foot with fat layer exposed byCatarina Almendarez DPM, Podiatry.  Culture from the wound grew Streptococcus intermedius wound VAC was applied.  Patient had bradycardia and pauses in the setting of beta-blocker therapy which has been weaned off.  On 9/17/2024 patient had significant left hip pain and his heart rate jumped in 140s without any antecedent trauma CT scan of the abdomen without contrast done on 9/17/2024 showed partially imaged moderate retroperitoneal hematoma within the psoas muscle and mildly displaced transversely oriented fracture of the sacrum at the S5 and C1 level there was no hip fracture seen.            Date of Admission:  9/9/2024  Assessment & Plan     Retroperitoneal bleed  This was seen on noncontrast CT  of the abdomen  With CKD contrast was not used as this would not change the diagnosis  Warfarin and Lovenox are on hold -this was discussed with the patient    Orthostatic hypotension  Patient's systolic blood pressure decreased from 113/63 to 72/41  500 mL of normal saline bolus will be given  Probably on the basis of orthostatic hypotension patient is having falls    Right leg/foot cellulitis  Chronic right plantar ulcer of unclear duration   Idiopathic Peripheral neuropathy with impaired sensation   -Blood culture positive on 2nd day of incubation-corynebacterium consistent with contaminant   -Underwent I and D with right foot with partial 1st ray amputation and partial 3rd amputation 9/12, culture results with 1+ GPB, 2+ GPC. Repeat I&D 9/14  -wound cultures: strep intermedius, shaalia odontolytica (prior actinomycetes), and most recent with corynebacterium   -MRSA swab negative  -rec;d single dose of vanco on admission, on day 7 pip-tazo.  Duration of treatment TBD  -leukocytosis which was decreasing 24->>>9.8 is now increased to 21.5     Sepsis   Fever/leukocytosis/elevated lactate  -now resolved but WBC count has increased  -Will consult ID in this regard to rule out infection     Left 3rd finger dislocation   S/p reduction in the ER   Appreciate ortho input, splint in place and should be worn for 2 weeks so can be removed on the 23rd or 24th [place]  Peripheral neurophathy-idiopathic  PT consultation      Left hip pain  Hurts with lifting and with flexion but not with internal or external rotation.  I checked an XRay showing  : Linear lucency through the left intertrochanteric femur seen only on the AP view, which may represent a nondisplaced fracture and could be further evaluated by CT so will ck CT this evening   Ordered prn muscle relaxant      Htn, hlp  AF/Flutter chronically on warfarin  Bradycardia   Pta regimen is metop xl 25 mg every day, isosorbide mono 60 mg every day, amlodipine 10 mg every  day,  rosuvastatin 10 mg every day, and warfarin   HR dipping down into the 40's with frequent 2 second pauses   -decreased metop xl to 12.5 mg every day but still with pauses and one was 4 seconds long as he briefly went into aflutter.  Also with bradycardia into the high 30's so stopped metop all together 9/15, or had planned to but I forgot to order, stopped this am after his am dose was given-cont tele   -resumed isosorbide at 60 mg  BPs look good, will cont to hold amlodipine at this time  -back on rosuvastatin  -not clear if he'll need additional procedures or not.  His warfarin has been on hold or only intermittently given and he's on day 6 so getting to the end of comfortable zone without AC.    Will not give warfarin or Lovenox because of retroperitoneal bleed     CKD 3 baseline creat 1.7-1.9  Remains at baseline     Mild cognitive impairment  delirium   Did have some delirium on night of admission and tends to have some mild confusion in the afternoon.  No significant delirium, no need for medications for agitation      Hx of bladder cancer  Bilateral renal masses  S/p cysto and cryoablation      Normocytic anemia   First noticed 7/2024, B12 wnl.  Does not appear to have been worked up further  Given progression will ck iron studies but suspect more likely due to chronic kidney disease               Plan:   IV fluid bolus 500 mL normal saline  Check CBC again to check if increased WBC count is real  Check a UA and chest x-ray  Discussed with podiatry when they dressed the right leg wound  Infectious diseases consult regarding increasing WBC count  CBC in a.m.    Diet: Regular Diet Adult  Snacks/Supplements Adult: Ensure Enlive; With Meals    DVT Prophylaxis: Patient has retroperitoneal bleed therefore warfarin and Lovenox are withheld  Galloway Catheter: Not present  Code Status: Full Code         Medically Ready for Discharge: Anticipated in 2-4 Days    Clinically Significant Risk Factors                     "  # Acute Hypoxic Respiratory Failure: Documented O2 saturation < 90%. Continue supplemental oxygen as needed         # Overweight: Estimated body mass index is 27.1 kg/m  as calculated from the following:    Height as of this encounter: 1.905 m (6' 3\").    Weight as of this encounter: 98.3 kg (216 lb 12.8 oz).                   The patient's care was discussed with the Patient and his 2 sons who are present in the room with him.    Bradford Gates MD  Hospitalist Service  [facility]    ______________________________________________________________________    Interval History     Symptoms   Patient did not fall down rather slip down to the ground today  He has orthostatic hypotension  WBC count has increased from 9.6 @ admission to 21.5    Review of Systems:   Patient had abdominal pain yesterday in the left lower quadrant but not at this present time    Data reviewed today: I reviewed all medications, new labs and imaging results over the last 24 hours.     Physical Exam   Vital Signs: Temp: 97.9  F (36.6  C) Temp src: Temporal BP: 116/61 Pulse: 72   Resp: 16 SpO2: 96 % O2 Device: None (Room air) Oxygen Delivery: 2 LPM  Weight: 216 lbs 12.8 oz      GENERAL: Patient is not in acute distress  HEENT: EOM+,Conjunctiva is clear   NECK:  no Jugular Venous distention  HEART: S1 S2 regular Rate and Rhythm, there is  no murmur,   LUNGS: Respirations are not laboured, Lungs decreased breath sounds in the lung bases to auscultation without Crepitations or Wheezing   ABDOMEN: Soft , there is no tenderness, Bowel Sounds are Positive   LOWER LIMBS: I did not open the dressing on the right lower extremity  CNS:  Alert,  Oriented x 3, Moving all the Four Limbs     Data   Recent Labs   Lab 09/18/24  0626 09/18/24  0156 09/17/24 2025 09/17/24 2012 09/17/24  0557 09/16/24  1327 09/16/24  0627 09/15/24  0646   WBC 21.5*  --   --   --   --   --  9.6 10.9   HGB 11.1*  10.8* 11.6*  --  12.3*  --   --  11.1* 11.0* "   MCV 89  --   --   --   --   --  90 90   *  --   --   --   --   --  260 255   INR 1.50*  --   --   --  1.56* 1.42* 1.52* 1.60*     --  139  --   --   --  139 139   POTASSIUM 4.2  --  4.4  --   --   --  4.2 4.3   CHLORIDE 107  --  106  --   --   --  110* 110*   CO2 18*  --  19*  --   --   --  19* 20*   BUN 25.2*  --  24.4*  --   --   --  29.3* 32.9*   CR 1.64*  --  1.54*  --   --   --  1.75* 1.75*   ANIONGAP 15  --  14  --   --   --  10 9   DAVID 10.2  --  9.8  --   --   --  9.8 9.6   *  --  138*  --   --   --  84 92         Recent Results (from the past 24 hour(s))   XR Pelvis w Hip Left 1 View    Narrative    EXAM: XR PELVIS AND HIP LEFT 1 VIEW  LOCATION: [facility]  DATE: 9/17/2024    INDICATION: acute hip pain with positioning  COMPARISON: None.      Impression    IMPRESSION: Linear lucency through the left intertrochanteric femur seen only on the AP view, which may represent a nondisplaced fracture and could be further evaluated by CT. No acute fracture identified elsewhere in the pelvis. Anatomic alignment of   the left hip. Mild degenerative arthritis of both sacroiliac joints and hips. Scattered arterial atherosclerotic calcifications.   CT Hip Left w/o Contrast    Narrative    EXAM: CT HIP LEFT W/O CONTRAST  LOCATION: [facility]  DATE: 9/17/2024    INDICATION: Acute left hip pain worse with bearing weight.  Hip x-ray with questioned non displaced fracture.  COMPARISON: Radiograph 09/17/2024  TECHNIQUE: Noncontrast. Axial, sagittal and coronal thin-section reconstruction. Dose reduction techniques were used.     FINDINGS:     BONES:  Mildly displaced transversely oriented fractures of the sacrum at the S5 and Co1 levels.    No fracture of the left hip. Mild left hip osteoarthritis. Degenerative change of the pubic symphysis and left sacroiliac joint. Moderate L5-S1 degenerative disc disease.    SOFT TISSUES:  Partially imaged high-attenuation  expansion of the psoas muscle compatible with moderate intramuscular hematoma.    No lymphadenopathy. No free fluid. Mild presacral edema.    Dilatation of the urinary bladder. Peripherally calcified central fat attenuation lesion adjacent to the sigmoid colon compatible sequela of fat necrosis. Additional calcified finding anterior to the bladder could represent a peritoneal body.      Impression    IMPRESSION:  1.  Partially imaged moderate retroperitoneal hematoma within the psoas muscle. Further evaluation with abdomen/pelvis CT is recommended.  2.  Mildly displaced transversely oriented fractures of the sacrum at the S5 and Co1 levels.  3.  No left hip fracture. Mild left hip osteoarthritis.         NOTE: ABNORMAL REPORT    THE DICTATION ABOVE DESCRIBES AN ABNORMALITY FOR WHICH FOLLOW-UP IS NEEDED.

## 2024-09-18 NOTE — PLAN OF CARE
"Afebrile.  New pain to LLE, XR & CT done.  No nausea, poor appetite, ate breakfast only.  Wound vac continuous suction -125mmHg.  Denies N/T.  R foot drsg CDI.  Voiding, LBM 9/16.  RLE heel WB with boot on.     Goal Outcome Evaluation:    Plan of Care Reviewed With: patient, family    Overall Patient Progress: decliningOverall Patient Progress: declining    Outcome Evaluation: New pain LLE.  XR & CT done.    Problem: Adult Inpatient Plan of Care  Goal: Plan of Care Review  Description: The Plan of Care Review/Shift note should be completed every shift.  The Outcome Evaluation is a brief statement about your assessment that the patient is improving, declining, or no change.  This information will be displayed automatically on your shift  note.  Outcome: Not Progressing  Flowsheets (Taken 9/17/2024 2019)  Outcome Evaluation: New pain LLE.  XR & CT done.  Plan of Care Reviewed With:   patient   family  Overall Patient Progress: declining  Goal: Patient-Specific Goal (Individualized)  Description: You can add care plan individualizations to a care plan. Examples of Individualization might be:  \"Parent requests to be called daily at 9am for status\", \"I have a hard time hearing out of my right ear\", or \"Do not touch me to wake me up as it startles  me\".  Outcome: Not Progressing  Goal: Absence of Hospital-Acquired Illness or Injury  Outcome: Not Progressing  Intervention: Identify and Manage Fall Risk  Recent Flowsheet Documentation  Taken 9/17/2024 0830 by Brian Franklin RN  Safety Promotion/Fall Prevention:   activity supervised   assistive device/personal items within reach   clutter free environment maintained   nonskid shoes/slippers when out of bed   safety round/check completed   room organization consistent   supervised activity   patient and family education   mobility aid in reach   lighting adjusted  Intervention: Prevent Skin Injury  Recent Flowsheet Documentation  Taken 9/17/2024 1353 by Brian Franklin, " RN  Body Position:   supine, head elevated   legs elevated  Taken 9/17/2024 1005 by Brian Franklin RN  Body Position: legs elevated  Taken 9/17/2024 0830 by Brian Franklin RN  Skin Protection:   adhesive use limited   incontinence pads utilized  Device Skin Pressure Protection:   absorbent pad utilized/changed   adhesive use limited   positioning supports utilized   pressure points protected   tubing/devices free from skin contact  Intervention: Prevent and Manage VTE (Venous Thromboembolism) Risk  Recent Flowsheet Documentation  Taken 9/17/2024 0830 by Brian Franklin RN  VTE Prevention/Management: SCDs on (sequential compression devices)  Intervention: Prevent Infection  Recent Flowsheet Documentation  Taken 9/17/2024 0830 by Brian Franklin RN  Infection Prevention:   hand hygiene promoted   equipment surfaces disinfected  Goal: Optimal Comfort and Wellbeing  Outcome: Not Progressing  Intervention: Monitor Pain and Promote Comfort  Recent Flowsheet Documentation  Taken 9/17/2024 1813 by Brian Franklin RN  Pain Management Interventions: rest  Taken 9/17/2024 1610 by Brian Franklin RN  Pain Management Interventions: rest  Taken 9/17/2024 1516 by Brian Franklin RN  Pain Management Interventions: medication (see MAR)  Taken 9/17/2024 1404 by Brian Franklin RN  Pain Management Interventions: rest  Taken 9/17/2024 1353 by Brian Franklin RN  Pain Management Interventions: (paged Hospitalist (Dr. Ramirez))   MD notified (comment)   rest   repositioned   pillow support provided   emotional support   therapeutic presence  Goal: Readiness for Transition of Care  Outcome: Not Progressing     Problem: Infection  Goal: Absence of Infection Signs and Symptoms  Outcome: Not Progressing     Problem: Fall Injury Risk  Goal: Absence of Fall and Fall-Related Injury  Outcome: Not Progressing  Intervention: Identify and Manage Contributors  Recent Flowsheet Documentation  Taken 9/17/2024 0830 by Brian Franklin RN  Medication  Review/Management: medications reviewed  Intervention: Promote Injury-Free Environment  Recent Flowsheet Documentation  Taken 9/17/2024 0830 by Brian Franklin RN  Safety Promotion/Fall Prevention:   activity supervised   assistive device/personal items within reach   clutter free environment maintained   nonskid shoes/slippers when out of bed   safety round/check completed   room organization consistent   supervised activity   patient and family education   mobility aid in reach   lighting adjusted     Problem: Skin Injury Risk Increased  Goal: Skin Health and Integrity  Outcome: Not Progressing  Intervention: Optimize Skin Protection  Recent Flowsheet Documentation  Taken 9/17/2024 1353 by Brian Franklin, DIANA  Activity Management: back to bed  Head of Bed (HOB) Positioning: HOB at 30-45 degrees  Taken 9/17/2024 1005 by Brian Franklin RN  Activity Management: up in chair  Taken 9/17/2024 0830 by Brian Franklin RN  Pressure Reduction Techniques:   frequent weight shift encouraged   heels elevated off bed   pressure points protected   positioned off wounds   rest period provided between sit times   weight shift assistance provided  Pressure Reduction Devices: positioning supports utilized  Skin Protection:   adhesive use limited   incontinence pads utilized  Activity Management: activity encouraged  Intervention: Promote and Optimize Oral Intake  Recent Flowsheet Documentation  Taken 9/17/2024 0830 by Brian Franklin RN  Oral Nutrition Promotion:   physical activity promoted   rest periods promoted   social interaction promoted     Problem: Comorbidity Management  Goal: Blood Pressure in Desired Range  Outcome: Not Progressing  Intervention: Maintain Blood Pressure Management  Recent Flowsheet Documentation  Taken 9/17/2024 0830 by Brian Franklin RN  Medication Review/Management: medications reviewed

## 2024-09-18 NOTE — PROGRESS NOTES
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  Wound vac applied started 9/16.  Changed VAC today.      Patient History (according to provider note(s):      Summary of Stay: Gregory Zhong is a 83 year old male with a history of htn/hlp, AF/flutter s/p ablation chronically on warfarin, most recent echo 2020 with EF 60% with benedicto, CKD 3b with baseline creat 1.7-1.9, mild cognitive impairment, hx of bladder cancer and bilateral renal masses s/p cryoablation, chronic right foot ulcer admitted on 9/9/2024 with a mechanical fall and found to have sepsis relate to unknown Right foot/leg cellulitis around his chronic ulcer and a dislocated left 3rd finger     Areas Assessed:      Areas visualized during today's visit: RLE    Negative pressure wound therapy applied to: Right dorsal foot     Last photo: 9-18-24 9-18-24 plantar/medial foot       Wound due to: Surgical Wound  Wound history/plan of care: chronic ulcers per report, hx idiopathic neuropathy  Surgical date: 9-14-24 Dr. Almendarez DPM   Service following: [facility], Podiatry  Date Negative Pressure Wound Therapy initiated: 9-16-24   Interventions in place: offloading and elevation  Is patient s nutritional status compromised? no   If yes, what interventions are in place? N/A  Reason for initiating vac therapy?  High risk of infections, and Need for accelerated granulation tissue  Which?of?the?following?co-morbidities?apply? N/A  If diabetic is patient on a diabetic management program? N/A   Is osteomyelitis present in wound? no   If yes what treatments are in place? N/A  Wound base: moist red tissue     Palpation of the wound bed: normal      Drainage: moderate     Description of drainage: serosanguinous     Measurements (length x width x depth, in cm): 2.5 x 2.5 x 1.2cm     Tunneling:  "N/A     Undermining: N/A  Periwound skin: mostly intact, several areas of sutures adjacent to wound      Color: pink      Temperature: normal   Odor: none  Pain: mild, tender  Pain interventions prior to dressing change: slow and gentle cares   Treatment goal: Heal , Drainage control, Infection control/prevention, and Increase granulation  STATUS: initial assessment  Supplies ordered: at bedside       Number of foam pieces removed from a wound (excluding foam for bridge) : 1 GranuFoam   Verified this matched the number of foam pieces applied last dressing change: Yes  Number of foam pieces packed into wound (excluding foam for bridge) : 1 GranuFoam Black      Treatment Plan:     Negative pressure wound therapy plan:  Wound location: Right dorsal foot   Change Days: Mon/Wed/Fri by WOC RN    Supplies (including all accessories) used: small  Black foam   Cleanse with Vashe prior to replacing NPWT  Suction setting: -125   Methods used: Bridged trac pad off bony prominences and Placed barrier ring into periwound creases to improve seal    Staff RN to assess integrity of dressing and ensure suction is set at appropriate level every shift.   Date canister. Chart canister output every shift. Change cannister weekly and PRN if full/occluded     Remove foam dressing and replace with BID normal saline moist gauze dressing if:   -a dressing failure which cannot be repaired within 2 hours   -patient is discharging to home without a home pump   -patient is discharging to a facility outside the local area   -if a dressing is a \"Silver Foam\", remove before Radiation Therapy or MRI     The hospital VAC pump is not to be discharged with the patient.?Ensure to disconnect patient from machine prior to discharge. Then,    - If a home KCI VAC pump has been delivered, connect home cannister to dressing tubing then connect cannister to home pump and turn on machine    - If transferring to a nearby facility with a KCI vac, can disconnect " and clamp tubing then cover end with glove so can be reconnected within 2 hours        Orders: Reviewed    RECOMMEND PRIMARY TEAM ORDER: None, at this time  Education provided: plan of care  Discussed plan of care with: Patient, Family, and Nurse  WO nurse follow-up plan: Monday/WednesdayFriday  Notify WOC if wound(s) deteriorate.  Nursing to notify the Provider(s) and re-consult the WOC Nurse if new skin concern.    DATA:     Current support surface: Standard  Standard gel mattress (Isoflex)  Containment of urine/stool:  not assessed  BMI: Body mass index is 27.1 kg/m .   Active diet order: Orders Placed This Encounter      Regular Diet Adult     Output: I/O last 3 completed shifts:  In: 240 [P.O.:240]  Out: 50 [Drains:50]     Labs:   Recent Labs   Lab 09/18/24  1417 09/18/24  1238 09/18/24  0626   HGB 9.9*   < > 11.1*  10.8*   INR  --   --  1.50*   WBC 24.9*  --  21.5*    < > = values in this interval not displayed.     Pressure injury risk assessment:   Sensory Perception: 3-->slightly limited  Moisture: 3-->occasionally moist  Activity: 3-->walks occasionally  Mobility: 3-->slightly limited  Nutrition: 3-->adequate  Friction and Shear: 2-->potential problem  Ty Score: 17    Cecil Albert RN CWOCN  Contact [facility] Nurse (Natalie)  Dept. Office Number: [phone]

## 2024-09-18 NOTE — PROVIDER NOTIFICATION
Alfred page: Pt just had near faint episode sitting on edge of bed, we were able to get him back to bed safely, he is now alert and talking. /61-HR 60's a fib controlled.    180.3

## 2024-09-19 ENCOUNTER — APPOINTMENT (OUTPATIENT)
Dept: PHYSICAL THERAPY | Facility: CLINIC | Age: 84
DRG: 853 | End: 2024-09-19
Payer: COMMERCIAL

## 2024-09-19 LAB
ANION GAP SERPL CALCULATED.3IONS-SCNC: 10 MMOL/L (ref 7–15)
BACTERIA TISS BX CULT: ABNORMAL
BACTERIA TISS BX CULT: ABNORMAL
BUN SERPL-MCNC: 29.4 MG/DL (ref 8–23)
CALCIUM SERPL-MCNC: 9.7 MG/DL (ref 8.8–10.4)
CHLORIDE SERPL-SCNC: 108 MMOL/L (ref 98–107)
CREAT SERPL-MCNC: 1.6 MG/DL (ref 0.67–1.17)
EGFRCR SERPLBLD CKD-EPI 2021: 43 ML/MIN/1.73M2
ERYTHROCYTE [DISTWIDTH] IN BLOOD BY AUTOMATED COUNT: 14.5 % (ref 10–15)
GLUCOSE SERPL-MCNC: 102 MG/DL (ref 70–99)
HCO3 SERPL-SCNC: 22 MMOL/L (ref 22–29)
HCT VFR BLD AUTO: 28 % (ref 40–53)
HGB BLD-MCNC: 8.8 G/DL (ref 13.3–17.7)
INR PPP: 1.45 (ref 0.85–1.15)
MCH RBC QN AUTO: 28.8 PG (ref 26.5–33)
MCHC RBC AUTO-ENTMCNC: 31.4 G/DL (ref 31.5–36.5)
MCV RBC AUTO: 92 FL (ref 78–100)
PLATELET # BLD AUTO: 358 10E3/UL (ref 150–450)
POTASSIUM SERPL-SCNC: 4.2 MMOL/L (ref 3.4–5.3)
RBC # BLD AUTO: 3.06 10E6/UL (ref 4.4–5.9)
SODIUM SERPL-SCNC: 140 MMOL/L (ref 135–145)
WBC # BLD AUTO: 22.7 10E3/UL (ref 4–11)

## 2024-09-19 PROCEDURE — 85610 PROTHROMBIN TIME: CPT | Performed by: INTERNAL MEDICINE

## 2024-09-19 PROCEDURE — 250N000013 HC RX MED GY IP 250 OP 250 PS 637: Performed by: INTERNAL MEDICINE

## 2024-09-19 PROCEDURE — 97530 THERAPEUTIC ACTIVITIES: CPT | Mod: GP

## 2024-09-19 PROCEDURE — 250N000013 HC RX MED GY IP 250 OP 250 PS 637: Performed by: PODIATRIST

## 2024-09-19 PROCEDURE — 97116 GAIT TRAINING THERAPY: CPT | Mod: GP

## 2024-09-19 PROCEDURE — 99232 SBSQ HOSP IP/OBS MODERATE 35: CPT | Performed by: INTERNAL MEDICINE

## 2024-09-19 PROCEDURE — 36415 COLL VENOUS BLD VENIPUNCTURE: CPT | Performed by: INTERNAL MEDICINE

## 2024-09-19 PROCEDURE — 80048 BASIC METABOLIC PNL TOTAL CA: CPT | Performed by: INTERNAL MEDICINE

## 2024-09-19 PROCEDURE — 99222 1ST HOSP IP/OBS MODERATE 55: CPT | Performed by: SPECIALIST

## 2024-09-19 PROCEDURE — 85027 COMPLETE CBC AUTOMATED: CPT | Performed by: INTERNAL MEDICINE

## 2024-09-19 PROCEDURE — 120N000001 HC R&B MED SURG/OB

## 2024-09-19 RX ORDER — TAMSULOSIN HYDROCHLORIDE 0.4 MG/1
0.4 CAPSULE ORAL DAILY
Status: DISCONTINUED | OUTPATIENT
Start: 2024-09-19 | End: 2024-09-20 | Stop reason: HOSPADM

## 2024-09-19 RX ADMIN — THERA TABS 1 TABLET: TAB at 08:41

## 2024-09-19 RX ADMIN — SENNOSIDES AND DOCUSATE SODIUM 1 TABLET: 8.6; 5 TABLET ORAL at 21:06

## 2024-09-19 RX ADMIN — ACETAMINOPHEN 650 MG: 325 TABLET, FILM COATED ORAL at 21:06

## 2024-09-19 RX ADMIN — TAMSULOSIN HYDROCHLORIDE 0.4 MG: 0.4 CAPSULE ORAL at 18:04

## 2024-09-19 RX ADMIN — SENNOSIDES AND DOCUSATE SODIUM 1 TABLET: 8.6; 5 TABLET ORAL at 08:40

## 2024-09-19 RX ADMIN — ROSUVASTATIN 10 MG: 10 TABLET, FILM COATED ORAL at 08:40

## 2024-09-19 RX ADMIN — AMOXICILLIN AND CLAVULANATE POTASSIUM 1 TABLET: 875; 125 TABLET, FILM COATED ORAL at 08:40

## 2024-09-19 RX ADMIN — AMOXICILLIN AND CLAVULANATE POTASSIUM 1 TABLET: 875; 125 TABLET, FILM COATED ORAL at 21:06

## 2024-09-19 RX ADMIN — POLYETHYLENE GLYCOL 3350 17 G: 17 POWDER, FOR SOLUTION ORAL at 08:40

## 2024-09-19 RX ADMIN — FAMOTIDINE 20 MG: 20 TABLET, FILM COATED ORAL at 08:40

## 2024-09-19 ASSESSMENT — ACTIVITIES OF DAILY LIVING (ADL)
ADLS_ACUITY_SCORE: 44
ADLS_ACUITY_SCORE: 40
ADLS_ACUITY_SCORE: 39
ADLS_ACUITY_SCORE: 44
ADLS_ACUITY_SCORE: 39
ADLS_ACUITY_SCORE: 41
ADLS_ACUITY_SCORE: 40
ADLS_ACUITY_SCORE: 39
ADLS_ACUITY_SCORE: 40
ADLS_ACUITY_SCORE: 44
ADLS_ACUITY_SCORE: 40
ADLS_ACUITY_SCORE: 40
ADLS_ACUITY_SCORE: 44
ADLS_ACUITY_SCORE: 40
ADLS_ACUITY_SCORE: 39
ADLS_ACUITY_SCORE: 39
ADLS_ACUITY_SCORE: 44
ADLS_ACUITY_SCORE: 44

## 2024-09-19 NOTE — PLAN OF CARE
"Pt is alert and oriented x 2. Pt denies any pain or shortness of breath and on room air.    Dressing to right foot is CDI. See flowsheet for CMS. Wound vac intact (see flowsheet).Heel touch weight bearing and 2 assist karen steady.    Pt straight cath for 600ml urine output; same tolerated well.    Tele: SR.     Fall mats x 2 in place. Ongoing monitoring.    Plan: Continue POC. Discharge TBD.     BP (!) 144/58 (BP Location: Left arm)   Pulse 64   Temp 97.4  F (36.3  C) (Temporal)   Resp 16   Ht 1.905 m (6' 3\")   Wt 98.3 kg (216 lb 12.8 oz)   SpO2 99%   BMI 27.10 kg/m       Problem: Adult Inpatient Plan of Care  Goal: Plan of Care Review  Description: The Plan of Care Review/Shift note should be completed every shift.  The Outcome Evaluation is a brief statement about your assessment that the patient is improving, declining, or no change.  This information will be displayed automatically on your shift  note.  Outcome: Progressing  Flowsheets (Taken 9/19/2024 0630)  Outcome Evaluation: Pt denies any pain.  Plan of Care Reviewed With: patient  Overall Patient Progress: improving  Goal: Patient-Specific Goal (Individualized)  Description: You can add care plan individualizations to a care plan. Examples of Individualization might be:  \"Parent requests to be called daily at 9am for status\", \"I have a hard time hearing out of my right ear\", or \"Do not touch me to wake me up as it startles  me\".  Outcome: Progressing  Goal: Absence of Hospital-Acquired Illness or Injury  Outcome: Progressing  Intervention: Identify and Manage Fall Risk  Recent Flowsheet Documentation  Taken 9/19/2024 0600 by Loree Rubio RN  Safety Promotion/Fall Prevention: safety round/check completed  Taken 9/19/2024 0500 by Loree Rubio RN  Safety Promotion/Fall Prevention: safety round/check completed  Taken 9/19/2024 0400 by Loree Rubio RN  Safety Promotion/Fall Prevention: safety round/check completed  Taken 9/19/2024 0300 by " Ramiro, Olubukola N, RN  Safety Promotion/Fall Prevention: safety round/check completed  Taken 9/19/2024 0200 by Loree Rubio RN  Safety Promotion/Fall Prevention: safety round/check completed  Taken 9/19/2024 0100 by Loree Rubio RN  Safety Promotion/Fall Prevention: safety round/check completed  Taken 9/19/2024 0047 by Loree Rubio RN  Safety Promotion/Fall Prevention:   safety round/check completed   patient and family education   nonskid shoes/slippers when out of bed   lighting adjusted   assistive device/personal items within reach   activity supervised   clutter free environment maintained   room organization consistent  Taken 9/18/2024 2300 by Loree Rubio RN  Safety Promotion/Fall Prevention: safety round/check completed  Intervention: Prevent Skin Injury  Recent Flowsheet Documentation  Taken 9/19/2024 0047 by Loree Rubio RN  Skin Protection: adhesive use limited  Device Skin Pressure Protection: adhesive use limited  Intervention: Prevent and Manage VTE (Venous Thromboembolism) Risk  Recent Flowsheet Documentation  Taken 9/19/2024 0047 by Loree Rubio RN  VTE Prevention/Management: SCDs off (sequential compression devices)  Goal: Optimal Comfort and Wellbeing  Outcome: Progressing  Goal: Readiness for Transition of Care  Outcome: Progressing     Problem: Infection  Goal: Absence of Infection Signs and Symptoms  Outcome: Progressing     Problem: Fall Injury Risk  Goal: Absence of Fall and Fall-Related Injury  Outcome: Progressing  Intervention: Identify and Manage Contributors  Recent Flowsheet Documentation  Taken 9/19/2024 0047 by Loree Rubio RN  Medication Review/Management: medications reviewed  Intervention: Promote Injury-Free Environment  Recent Flowsheet Documentation  Taken 9/19/2024 0600 by Loree Rubio RN  Safety Promotion/Fall Prevention: safety round/check completed  Taken 9/19/2024 0500 by Loree Rubio RN  Safety Promotion/Fall Prevention: safety  round/check completed  Taken 9/19/2024 0400 by Loree Rubio RN  Safety Promotion/Fall Prevention: safety round/check completed  Taken 9/19/2024 0300 by Loree Rubio RN  Safety Promotion/Fall Prevention: safety round/check completed  Taken 9/19/2024 0200 by Loree Rubio RN  Safety Promotion/Fall Prevention: safety round/check completed  Taken 9/19/2024 0100 by Loree Rubio RN  Safety Promotion/Fall Prevention: safety round/check completed  Taken 9/19/2024 0047 by Loree Rubio RN  Safety Promotion/Fall Prevention:   safety round/check completed   patient and family education   nonskid shoes/slippers when out of bed   lighting adjusted   assistive device/personal items within reach   activity supervised   clutter free environment maintained   room organization consistent  Taken 9/18/2024 2300 by Loree Rubio RN  Safety Promotion/Fall Prevention: safety round/check completed     Problem: Skin Injury Risk Increased  Goal: Skin Health and Integrity  Outcome: Progressing  Intervention: Plan: Nurse Driven Intervention: Moisture Management  Recent Flowsheet Documentation  Taken 9/19/2024 0047 by Loree Rubio RN  Moisture Interventions: Encourage regular toileting  Intervention: Plan: Nurse Driven Intervention: Friction and Shear  Recent Flowsheet Documentation  Taken 9/19/2024 0047 by Loree Rubio RN  Friction/Shear Interventions: HOB 30 degrees or less  Intervention: Optimize Skin Protection  Recent Flowsheet Documentation  Taken 9/19/2024 0047 by Loree Rubio RN  Pressure Reduction Techniques: frequent weight shift encouraged  Pressure Reduction Devices:   positioning supports utilized   heel offloading device utilized  Skin Protection: adhesive use limited     Problem: Comorbidity Management  Goal: Blood Pressure in Desired Range  Outcome: Progressing  Intervention: Maintain Blood Pressure Management  Recent Flowsheet Documentation  Taken 9/19/2024 0047 by Loree Rubio  RN  Medication Review/Management: medications reviewed   Goal Outcome Evaluation:      Plan of Care Reviewed With: patient    Overall Patient Progress: improvingOverall Patient Progress: improving    Outcome Evaluation: Pt denies any pain.

## 2024-09-19 NOTE — PROGRESS NOTES
Care Management Follow Up    Length of Stay (days): 9    Expected Discharge Date: 09/20/2024     Concerns to be Addressed: discharge planning     Patient plan of care discussed at interdisciplinary rounds: Yes    Anticipated Discharge Disposition: Skilled Nursing Facility, Transitional Care              Anticipated Discharge Services: None  Anticipated Discharge DME: None    Patient/family educated on Medicare website which has current facility and service quality ratings: yes  Education Provided on the Discharge Plan: Yes  Patient/Family in Agreement with the Plan: yes    Referrals Placed by CM/SW: Post Acute Facilities  Private pay costs discussed: transportation costs    Discussed  Partnership in Safe Discharge Planning  document with patient/family: No     Handoff Completed: No, handoff not indicated or clinically appropriate    Additional Information:  [facility] is able to accept patient for TCU placement tomorrow. Updated MD.   Will update patient and son.   Provided [facility] with [employer] auth #. Will plan for discharge late morning or early afternoon. They are ordering the wound vac and use 3M wound vac as well.     Next Steps: PAS needed, arrange transport    Trudy Randhawa RN  Care Coordinator  [facility]

## 2024-09-19 NOTE — CONSULTS
Infectious Disease Consultation     Date of Admission:  9/9/2024  Date of Consult : 09/19/24    Assessment:  83YM with multiple medical conditions, who was admitted on 9/9 following a fall , and was subsequently found to have right foot cellulitis with osteomyelitis of the sesamoid and 3rd digit. He is s/p right partial first ray excision and partial 3rd digit amputation on 9/12, and excisional debridement of the right foot on 9/14. Cxs grew strep intermedius, actinomyces sp and corynebacteria sp. Course has been complicated by new left hip pain related to a retroperitoneal hematoma of the psoas muscle     -Right foot / leg cellulitis  -Right foot infection/osteomyelitis of the right foot. S/p right partial first ray excision and partial 3rd digit amputation on 9/12, and excisional debridement of the right foot on 9/14. Cxs grew strep intermedius, actinomyces sp and corynebacteria sp.  -Retroperitoneal bleed within the left psoas muscle causing left hip pain  -Mechanical fall with multiple injuries - left 3rd finger dislocation, mildly displaced fractures of the sacrum at S5 and coccyx 1 level, retroperitoneal bleed.  -Idiopathic peripheral neuropathy  -Chronic medical conditions - hypertension, hypercholesteremia, atrial fibrillation/flutter s/p ablation , CKD , cognitive impairment, history of bladder cancer,  bilateral renal masses s/p cryoablation       Recommendations:  Suspect leukocytosis is reactive related to retroperitoneal bleed, continue to monitor  Continue Augmentin for foot infection.      Ellen Donovan MD    Reason for Consult   Reason for consult: I was asked to evaluate this patient for osteomyelitis.    Primary Care Physician   [facility]    Chief Complaint   Fall with multiple injuries, right foot infection and cellulitis    History is obtained from the patient and medical records    History of Present Illness   Gregory Zhong is a 83 year  old male  with multiple medical conditions, who lives alone, and was admitted on 9/9 following a fall .    He sustained multiple I juries including dislocation of left 3rd finger, and was subsequently found to have right foot cellulitis with osteomyelitis of the sesamoid and 3rd digit. He is s/p right partial first ray excision and partial 3rd digit amputation on 9/12, and excisional debridement of the right foot on 9/14. Cxs grew strep intermedius, actinomyces sp and corynebacteria sp. Course has been complicated by new left hip pain related to a retroperitoneal hematoma of the psoas muscle     Patient was on zosyn and has been transitioned to Augmentin. He developed leukocytosis along with hip pain which lead to the diagnosis of retroperitoneal hemtaoma. Leukocytosis is improving now.  ID has been asked to assist with further management.    Antimicrobial therapy  9/9 zosyn  Past Medical History   I have reviewed this patient's medical history and updated it with pertinent information if needed.   Past Medical History:   Diagnosis Date    Atrial flutter (H)     Bladder cancer (H)     Chronic atrial fibrillation (H)     Chronic kidney disease, stage III (moderate) (H)     Dyslipidemia     Gastroesophageal reflux disease     History of basal cell carcinoma     HLD (hyperlipidemia)     Hyperparathyroidism (H24)     Hypertension     Mild cognitive impairment     Prostate cancer (H)     Tremor        Past Surgical History   I have reviewed this patient's surgical history and updated it with pertinent information if needed.  Past Surgical History:   Procedure Laterality Date    AMPUTATE TOE(S) Right 9/12/2024    Procedure: Right foot partial first ray resection, right foot partial third digit amputation, right foot excisional debridement down to and including tendon, site measuring 7 cm x 4 cm x 2 cm;  Surgeon: Nedra Workman DPM;  Location: [facility] OR    APPENDECTOMY      Cryoablation of left and right renal masses       CYSTOSCOPY      Excision of basal cell carcinoma      IRRIGATION AND DEBRIDEMENT FOOT, COMBINED Right 9/14/2024    Procedure: Excisional debridement of necrotic tissue right foot;  Surgeon: Catarina Almendarez DPM, Podiatry/Foot and Ankle Surgery;  Location: [facility] OR    MOHS MICROGRAPHIC PROCEDURE      TONSILLECTOMY         Prior to Admission Medications   Prior to Admission Medications   Prescriptions Last Dose Informant Patient Reported? Taking?   Multiple Vitamins-Minerals (PRESERVISION AREDS 2) CHEW 9/9/2024  Yes Yes   Sig: Take 1 tablet by mouth 2 times daily.   alendronate (FOSAMAX) 70 MG tablet 9/7/2024  Yes Yes   Sig: Take 70 mg by mouth every 7 days. On Saturdays   amLODIPine (NORVASC) 10 MG tablet 9/9/2024  Yes Yes   Sig: Take 10 mg by mouth daily.   famotidine (PEPCID) 20 MG tablet 9/9/2024 at am  Yes Yes   Sig: Take 20 mg by mouth 2 times daily.   hypromellose-dextran (HYPROMELLOSE-DEXTRAN 0.3-0.1%) 0.1-0.3 % ophthalmic solution prn  Yes Yes   Sig: Place 1-2 drops into both eyes 4 times daily as needed for dry eyes.   isosorbide mononitrate (IMDUR) 60 MG 24 hr tablet 9/9/2024  Yes Yes   Sig: Take 60 mg by mouth daily.   metoprolol succinate ER (TOPROL XL) 25 MG 24 hr tablet 9/9/2024  Yes Yes   Sig: Take 25 mg by mouth daily.   patiromer (VELTASSA) 8.4 g packet 9/9/2024  Yes Yes   Sig: Take 8.4 g by mouth. on Mon Wed Fri at 1500   rosuvastatin (CRESTOR) 10 MG tablet 9/9/2024  Yes Yes   Sig: Take 10 mg by mouth daily.   vitamin D3 (CHOLECALCIFEROL) 50 mcg (2000 units) tablet 9/9/2024  Yes Yes   Sig: Take 1 tablet by mouth daily.   warfarin ANTICOAGULANT (COUMADIN) 5 MG tablet 7.5mg on 9/8/2024 at pm  Yes Yes   Sig: Take 5 mg by mouth daily.      Facility-Administered Medications: None     Allergies   No Known Allergies    Immunization History   Immunization History   Administered Date(s) Administered    COVID-19 12+ (Pfizer) 12/12/2023    COVID-19 Bivalent 12+ (Pfizer) 09/27/2022    COVID-19 MONOVALENT 12+  (Pfizer) 01/22/2021, 02/12/2021, 10/26/2021       Social History   I have reviewed this patient's social history and updated it with pertinent information if needed. Gregory Zhong  reports that he has never smoked. He does not have any smokeless tobacco history on file.    Family History   I have reviewed this patient's family history and updated it with pertinent information if needed.   History reviewed. No pertinent family history.    Review of Systems   The 10 point Review of Systems is as per HPI    Physical Exam   Temp: 97.5  F (36.4  C) Temp src: Temporal BP: 130/54 Pulse: 62   Resp: 20 SpO2: 96 % O2 Device: None (Room air)    Vital Signs with Ranges  Temp:  [96.7  F (35.9  C)-97.7  F (36.5  C)] 97.5  F (36.4  C)  Pulse:  [58-67] 62  Resp:  [16-20] 20  BP: (115-151)/(54-68) 130/54  SpO2:  [96 %-99 %] 96 %  216 lbs 12.8 oz  Body mass index is 27.1 kg/m .    GENERAL APPEARANCE:  awake, a little confused  EYES: Eyes grossly normal to inspection  NECK: no adenopathy  RESP: lungs clear   CV: S1S2, irregular  ABDOMEN: soft, nontender  MS: r foto wound vac in placel  SKIN: no suspicious lesions or rashes    Data   All laboratory data reviewed  Component      Latest Ref Rng 9/19/2024  9:10 AM   Sodium      135 - 145 mmol/L 140    Potassium      3.4 - 5.3 mmol/L 4.2    Chloride      98 - 107 mmol/L 108 (H)    Carbon Dioxide (CO2)      22 - 29 mmol/L 22    Anion Gap      7 - 15 mmol/L 10    Urea Nitrogen      8.0 - 23.0 mg/dL 29.4 (H)    Creatinine      0.67 - 1.17 mg/dL 1.60 (H)    GFR Estimate      >60 mL/min/1.73m2 43 (L)    Calcium      8.8 - 10.4 mg/dL 9.7    Glucose      70 - 99 mg/dL 102 (H)    WBC      4.0 - 11.0 10e3/uL 22.7 (H)    RBC Count      4.40 - 5.90 10e6/uL 3.06 (L)    Hemoglobin      13.3 - 17.7 g/dL 8.8 (L)    Hematocrit      40.0 - 53.0 % 28.0 (L)    MCV      78 - 100 fL 92    MCH      26.5 - 33.0 pg 28.8    MCHC      31.5 - 36.5 g/dL 31.4 (L)    RDW      10.0 - 15.0 % 14.5    Platelet Count       150 - 450 10e3/uL 358        Microbiology     09/14/2024 0815 09/19/2024 1031 Anaerobic Bacterial Culture Routine [[id number]]   Tissue from Foot, Right    Preliminary result Component Value   Culture No anaerobic organisms isolated after 5 days P             09/14/2024 0815 09/14/2024 1055 Gram Stain [[id number]]   Tissue from Foot, Right    Final result Component Value   GS Culture See corresponding culture for results   Gram Stain Result No organisms seen   Gram Stain Result 4+ WBC seen   Gram Stain Result 3+ PMNs Seen   Gram Stain Result 4+ Red blood cells seen          09/14/2024 0815 09/19/2024 1031 Fungal or Yeast Culture Routine [[id number]]   Tissue from Foot, Right    Preliminary result Component Value   Culture No growth after 5 days P             09/14/2024 0815 09/16/2024 1234 Tissue Aerobic Bacterial Culture Routine [[id number]]   (Abnormal)   Tissue from Foot, Right    Final result Component Value   Culture 1+ Corynebacterium species Abnormal     Identification obtained by MALDI-TOF mass spectrometry research use only database. Test characteristics determined and verified by the [facility]ious Diseases Diagnostic Laboratory.  Susceptibilities not routinely done, refer to antibiogram to view typical susceptibility profiles        Imaging  Exam: MRI of the right foot without contrast dated 9/10/2024.     COMPARISON: None.     CLINICAL HISTORY: Plantar foot ulcer with sepsis.     TECHNIQUE: Multiplanar, multisequence MR imaging of the right foot was  obtained using standard sequences in 3 orthogonal planes without the  use of intravenous or intra-articular gadolinium and contrast.     FINDINGS:     There is an ulcerative defect along the plantar aspect of the distal  first metatarsal. Adjacent to the ulcer, there is low T1 signal with  increased T2 signal in the tibial greater than fibular sesamoid.  Contrast was not administered. Mild increased T2 signal without  increased T1 signal in the great toe  proximal phalanx.     Low T1 signal with increased T2 signal in the distal aspect of the  right third toe to phalanx, adjacent to a soft tissue defect.     Atrophy within the musculature about the forefoot with diffuse edema  within the musculature. No discrete abscess although contrast was not  administered. There is diffuse subcutaneous soft tissue edema.     No full-thickness tendon tear or tendon retraction. The Lisfranc  ligament is intact.                                                                      IMPRESSION:  1. Ulcerative lesion along the plantar aspect of the distal first  metatarsal. There is low T1 signal, with increased T2 signal in the  adjacent sesamoids, tibial greater than fibular, MRI findings most  consistent with osteomyelitis. There is subtle increased bone marrow  edema in the great toe proximal phalanx without low T1 signal,  presumed reactive osteitis.     2. Low T1 signal with increased T2 signal in the distal aspect of the  third toe distal phalanx, MRI findings most consistent with  osteomyelitis, this is adjacent to a soft tissue defect, sagittal  series 4 image 9.     3. Diffuse soft tissue edema within the musculature, likely  representing myositis. No discrete abscess although contrast was not  administered.     4. Diffuse subcutaneous soft tissue edema along dorsal aspect of the  foot, findings most consistent with cellulitis.

## 2024-09-19 NOTE — PROGRESS NOTES
Hospitalist Medicine Progress Note   [facility]       Gregory Zhong is a 83 year old gentleman who lives alone and is home with hypertension, hypercholesteremia, atrial fibrillation/flutter s/p ablation chronically on warfarin therapy stage III CKD with baseline creatinine around 1.7-1.9, mild cognitive impairment, history of bladder cancer bilateral renal masses s/p cryoablation, chronic right foot ulceration was admitted to [facility] 9/9/2024 with a mechanical fall when he slipped and fell without loss of consciousness and was found to have sepsis related to unknown right foot/leg cellulitis around his chronic ulceration and dislocation of the third left finger.  His potassium was elevated at 6.1 creatinine was 1.66 calcium was elevated at 10.6 CRP was 72.4.  Left finger dislocation was reduced in the emergency department and a splint was applied.  He was started on Zosyn.  On 9/12/2024 he underwent right foot partial first ray resection, 3rd digit amputation, with excision dry debridement down to and including tendon of the right foot by Nedra Workman DPM.  On 9/14/2024 patient had right foot excision and debridement for the ulceration of the right foot with fat layer exposed byCatarina Almendarez DPM, Podiatry.  Culture from the wound grew Streptococcus intermedius wound VAC was applied.  Patient had bradycardia and pauses in the setting of beta-blocker therapy which has been weaned off.  On 9/17/2024 patient had significant left hip pain and his heart rate jumped in 140s without any antecedent trauma CT scan of the abdomen without contrast done on 9/17/2024 showed partially imaged moderate retroperitoneal hematoma within the psoas muscle and mildly displaced transversely oriented fracture of the sacrum at the S5 and C1 level there was no hip fracture seen.   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 Repeat I&D 9/14  -wound cultures: strep intermedius, shaalia odontolytica (prior actinomycetes), and most recent with corynebacterium   -MRSA swab negative  -rec;d single dose of vanco on admission, on day 7 pip-tazo.  Duration of treatment TBD       Sepsis   Fever/leukocytosis/elevated lactate  -now resolved but WBC count has increased which ID thinks is due to retroperitoneal hemorrhage  -Appreciate ID consult     Left 3rd finger dislocation   S/p reduction in the ER   Appreciate ortho input, splint in place and should be worn for 2 weeks so can be removed on the 23rd or [place]th [place]  Peripheral neurophathy-idiopathic  PT consultation      Left hip pain  Hurts with lifting and with flexion but not with internal or external rotation.  I checked an XRay showing  : Linear lucency through the left intertrochanteric femur seen only on the AP view, which may represent a nondisplaced fracture and could be further evaluated by CT so will ck CT this evening   Ordered prn muscle relaxant      Htn, hlp  AF/Flutter chronically on warfarin  Bradycardia   Pta regimen is metop xl 25 mg every day, isosorbide mono 60 mg every day,  amlodipine 10 mg every day,  rosuvastatin 10 mg every day, and warfarin   HR dipping down into the 40's with frequent 2 second pauses   -decreased metop xl to 12.5 mg every day but still with pauses and one was 4 seconds long as he briefly went into aflutter.  Also with bradycardia into the high 30's so stopped metop all together 9/15, or had planned to but I forgot to order, stopped this am after his am dose was given-cont tele   -resumed isosorbide at 60 mg  BPs look good, will cont to hold amlodipine at this time  -back on rosuvastatin  -not clear if he'll need additional procedures or not.  His warfarin has been on hold or only intermittently given and he's on day 6 so getting to the end of comfortable zone without AC.    Will not give warfarin or Lovenox because of retroperitoneal bleed     CKD 3 baseline creat 1.7-1.9  Remains at baseline     Mild cognitive impairment  delirium   Did have some delirium on night of admission and tends to have some mild confusion in the afternoon.  No significant delirium, no need for medications for agitation      Hx of bladder cancer  Bilateral renal masses  S/p cysto and cryoablation      Normocytic anemia   First noticed 7/2024, B12 wnl.  Does not appear to have been worked up further  Given progression will ck iron studies but suspect more likely due to chronic kidney disease               Plan:   Discharge in am   Discuss with podiatry when they dressed the right leg wound  CBC in a.m.    Diet: Regular Diet Adult  Snacks/Supplements Adult: Ensure Enlive; With Meals    DVT Prophylaxis: Patient has retroperitoneal bleed therefore warfarin and Lovenox are withheld  Galloway Catheter: Not present  Code Status: Full Code         Medically Ready for Discharge: Anticipated in 2-4 Days    Clinically Significant Risk Factors                      # Acute Hypoxic Respiratory Failure: Documented O2 saturation < 90%. Continue supplemental oxygen as needed         # Overweight: Estimated  "body mass index is 27.1 kg/m  as calculated from the following:    Height as of this encounter: 1.905 m (6' 3\").    Weight as of this encounter: 98.3 kg (216 lb 12.8 oz).                   The patient's care was discussed with the Patient and his 2 sons who are present in the room with him.    Bradford Gates MD  Hospitalist Service  [facility]    ______________________________________________________________________    Interval History     Symptoms   No new symptoms patient denies any abdominal pain    Review of Systems:   No fever or chills  Data reviewed today: I reviewed all medications, new labs and imaging results over the last 24 hours.     Physical Exam   Vital Signs: Temp: 97.4  F (36.3  C) Temp src: Temporal BP: (!) 144/58 Pulse: 64   Resp: 16 SpO2: 99 % O2 Device: None (Room air)    Weight: 216 lbs 12.8 oz      GENERAL: Patient is not in acute distress  HEENT: EOM+,Conjunctiva is clear   NECK:  no Jugular Venous distention  HEART: S1 S2 regular Rate and Rhythm, there is  no murmur,   LUNGS: Respirations are not laboured, Lungs decreased breath sounds in the lung bases to auscultation without Crepitations or Wheezing   ABDOMEN: Soft , there is no tenderness, Bowel Sounds are Positive   LOWER LIMBS: I did not open the dressing on the right lower extremity  CNS:  Alert,  Oriented x 3, Moving all the Four Limbs     Data   Recent Labs   Lab 09/18/24  1417 09/18/24  1238 09/18/24  0626 09/18/24  0156 09/17/24 2025 09/17/24 2012 09/17/24  0557 09/16/24  1327 09/16/24  0627   WBC 24.9*  --  21.5*  --   --   --   --   --  9.6   HGB 9.9* 10.6* 11.1*  10.8*   < >  --    < >  --   --  11.1*   MCV 91  --  89  --   --   --   --   --  90     --  479*  --   --   --   --   --  260   INR  --   --  1.50*  --   --   --  1.56* 1.42* 1.52*   NA  --   --  140  --  139  --   --   --  139   POTASSIUM  --   --  4.2  --  4.4  --   --   --  4.2   CHLORIDE  --   --  107  --  106  --   --   --  110* "   CO2  --   --  18*  --  19*  --   --   --  19*   BUN  --   --  25.2*  --  24.4*  --   --   --  29.3*   CR  --   --  1.64*  --  1.54*  --   --   --  1.75*   ANIONGAP  --   --  15  --  14  --   --   --  10   DAVID  --   --  10.2  --  9.8  --   --   --  9.8   GLC  --   --  130*  --  138*  --   --   --  84    < > = values in this interval not displayed.         Recent Results (from the past 24 hour(s))   XR Chest Port 1 View    Narrative    CHEST ONE VIEW  9/18/2024 2:23 PM     HISTORY: increased WBC    COMPARISON: None.      Impression    IMPRESSION: No acute disease.    ALYSE ROOT MD         SYSTEM ID:  [id number]

## 2024-09-19 NOTE — PLAN OF CARE
"Goal Outcome Evaluation:      Outcome Evaluation: pt A&O, forgetful at times. denies pain. Wound Vac in place. 0 ouput.  up assist of 2 GB and walker with ortho boot. Heel WB. Page to Dr. Garcia at 1750 bladder scan was 601 ml. Told him I was going to cath him for the third time. Dr. Garcia said not to leave the catheter in, he ordered po Flowmax to start this evening. St cath done for 650ml at 1845.     Plan of Care Reviewed With:   patient   family  Overall Patient Progress: improving      Problem: Adult Inpatient Plan of Care  Goal: Plan of Care Review  Description: The Plan of Care Review/Shift note should be completed every shift.  The Outcome Evaluation is a brief statement about your assessment that the patient is improving, declining, or no change.  This information will be displayed automatically on your shift  note.  Outcome: Progressing  Flowsheets (Taken 9/19/2024 1412)  Outcome Evaluation: pt A&O, forgetful at times. denies pain. Wound Vac in place. up assist of 2 GB and walker.  Plan of Care Reviewed With:   patient   family  Overall Patient Progress: improving  Goal: Patient-Specific Goal (Individualized)  Description: You can add care plan individualizations to a care plan. Examples of Individualization might be:  \"Parent requests to be called daily at 9am for status\", \"I have a hard time hearing out of my right ear\", or \"Do not touch me to wake me up as it startles  me\".  Outcome: Progressing  Goal: Absence of Hospital-Acquired Illness or Injury  Outcome: Progressing  Intervention: Identify and Manage Fall Risk  Recent Flowsheet Documentation  Taken 9/19/2024 8430 by Leilani Bundy RN  Safety Promotion/Fall Prevention:   activity supervised   assistive device/personal items within reach   clutter free environment maintained   lighting adjusted   mobility aid in reach   nonskid shoes/slippers when out of bed   patient and family education   safety round/check completed   supervised " activity  Intervention: Prevent Skin Injury  Recent Flowsheet Documentation  Taken 9/19/2024 0830 by Leilani Bundy RN  Body Position: (heels offloaded)   supine, head elevated   heels elevated   other (see comments)  Skin Protection: adhesive use limited  Device Skin Pressure Protection: adhesive use limited  Intervention: Prevent Infection  Recent Flowsheet Documentation  Taken 9/19/2024 0830 by Leilani Bundy RN  Infection Prevention:   environmental surveillance performed   hand hygiene promoted   rest/sleep promoted  Goal: Optimal Comfort and Wellbeing  Outcome: Progressing  Intervention: Monitor Pain and Promote Comfort  Recent Flowsheet Documentation  Taken 9/19/2024 0830 by Leilani Bundy RN  Pain Management Interventions: pain management plan reviewed with patient/caregiver  Goal: Readiness for Transition of Care  Outcome: Progressing     Problem: Infection  Goal: Absence of Infection Signs and Symptoms  Outcome: Progressing  Intervention: Prevent or Manage Infection  Recent Flowsheet Documentation  Taken 9/19/2024 0830 by Leilani Bundy RN  Infection Management: aseptic technique maintained     Problem: Fall Injury Risk  Goal: Absence of Fall and Fall-Related Injury  Outcome: Progressing  Intervention: Identify and Manage Contributors  Recent Flowsheet Documentation  Taken 9/19/2024 0830 by Leilani Bundy RN  Self-Care Promotion:   independence encouraged   BADL personal objects within reach   meal set-up provided   adaptive equipment use encouraged  Medication Review/Management: medications reviewed  Intervention: Promote Injury-Free Environment  Recent Flowsheet Documentation  Taken 9/19/2024 0830 by Leilani Bundy RN  Safety Promotion/Fall Prevention:   activity supervised   assistive device/personal items within reach   clutter free environment maintained   lighting adjusted   mobility aid in reach   nonskid shoes/slippers when out of bed   patient and family education    safety round/check completed   supervised activity     Problem: Skin Injury Risk Increased  Goal: Skin Health and Integrity  Outcome: Progressing  Intervention: Plan: Nurse Driven Intervention: Moisture Management  Recent Flowsheet Documentation  Taken 9/19/2024 0830 by Leilani Bundy RN  Moisture Interventions: Encourage regular toileting  Intervention: Plan: Nurse Driven Intervention: Friction and Shear  Recent Flowsheet Documentation  Taken 9/19/2024 0830 by Leilani Bundy RN  Friction/Shear Interventions: HOB 30 degrees or less  Intervention: Optimize Skin Protection  Recent Flowsheet Documentation  Taken 9/19/2024 0830 by Leilani Bundy RN  Pressure Reduction Techniques: frequent weight shift encouraged  Pressure Reduction Devices: positioning supports utilized  Skin Protection: adhesive use limited  Activity Management: activity encouraged  Head of Bed (HOB) Positioning: HOB at 30-45 degrees     Problem: Comorbidity Management  Goal: Blood Pressure in Desired Range  Outcome: Progressing  Intervention: Maintain Blood Pressure Management  Recent Flowsheet Documentation  Taken 9/19/2024 0830 by Leilani Bundy RN  Medication Review/Management: medications reviewed

## 2024-09-20 VITALS
SYSTOLIC BLOOD PRESSURE: 120 MMHG | WEIGHT: 216.8 LBS | RESPIRATION RATE: 16 BRPM | HEART RATE: 73 BPM | DIASTOLIC BLOOD PRESSURE: 63 MMHG | OXYGEN SATURATION: 97 % | TEMPERATURE: 98 F | HEIGHT: 75 IN | BODY MASS INDEX: 26.96 KG/M2

## 2024-09-20 LAB
ERYTHROCYTE [DISTWIDTH] IN BLOOD BY AUTOMATED COUNT: 14.6 % (ref 10–15)
HCT VFR BLD AUTO: 26.5 % (ref 40–53)
HGB BLD-MCNC: 8.4 G/DL (ref 13.3–17.7)
HOLD SPECIMEN: NORMAL
INR PPP: 1.33 (ref 0.85–1.15)
MCH RBC QN AUTO: 29 PG (ref 26.5–33)
MCHC RBC AUTO-ENTMCNC: 31.7 G/DL (ref 31.5–36.5)
MCV RBC AUTO: 91 FL (ref 78–100)
PLATELET # BLD AUTO: 333 10E3/UL (ref 150–450)
RBC # BLD AUTO: 2.9 10E6/UL (ref 4.4–5.9)
WBC # BLD AUTO: 16.4 10E3/UL (ref 4–11)

## 2024-09-20 PROCEDURE — 250N000013 HC RX MED GY IP 250 OP 250 PS 637: Performed by: INTERNAL MEDICINE

## 2024-09-20 PROCEDURE — 85014 HEMATOCRIT: CPT | Performed by: INTERNAL MEDICINE

## 2024-09-20 PROCEDURE — 250N000013 HC RX MED GY IP 250 OP 250 PS 637: Performed by: PODIATRIST

## 2024-09-20 PROCEDURE — 97605 NEG PRS WND THER DME<=50SQCM: CPT

## 2024-09-20 PROCEDURE — 36415 COLL VENOUS BLD VENIPUNCTURE: CPT | Performed by: INTERNAL MEDICINE

## 2024-09-20 PROCEDURE — 99239 HOSP IP/OBS DSCHRG MGMT >30: CPT | Performed by: INTERNAL MEDICINE

## 2024-09-20 PROCEDURE — 85610 PROTHROMBIN TIME: CPT | Performed by: INTERNAL MEDICINE

## 2024-09-20 PROCEDURE — 99232 SBSQ HOSP IP/OBS MODERATE 35: CPT | Performed by: SPECIALIST

## 2024-09-20 RX ORDER — TAMSULOSIN HYDROCHLORIDE 0.4 MG/1
0.4 CAPSULE ORAL DAILY
Qty: 30 CAPSULE | Refills: 0 | Status: SHIPPED | OUTPATIENT
Start: 2024-09-21 | End: 2024-10-21

## 2024-09-20 RX ORDER — POLYETHYLENE GLYCOL 3350 17 G/17G
17 POWDER, FOR SOLUTION ORAL DAILY
Qty: 510 G | Refills: 0 | Status: SHIPPED | OUTPATIENT
Start: 2024-09-20 | End: 2024-10-20

## 2024-09-20 RX ADMIN — POLYETHYLENE GLYCOL 3350 17 G: 17 POWDER, FOR SOLUTION ORAL at 08:44

## 2024-09-20 RX ADMIN — SENNOSIDES AND DOCUSATE SODIUM 1 TABLET: 8.6; 5 TABLET ORAL at 08:44

## 2024-09-20 RX ADMIN — TAMSULOSIN HYDROCHLORIDE 0.4 MG: 0.4 CAPSULE ORAL at 08:44

## 2024-09-20 RX ADMIN — AMOXICILLIN AND CLAVULANATE POTASSIUM 1 TABLET: 875; 125 TABLET, FILM COATED ORAL at 08:44

## 2024-09-20 RX ADMIN — ROSUVASTATIN 10 MG: 10 TABLET, FILM COATED ORAL at 08:44

## 2024-09-20 RX ADMIN — FAMOTIDINE 20 MG: 20 TABLET, FILM COATED ORAL at 08:44

## 2024-09-20 RX ADMIN — THERA TABS 1 TABLET: TAB at 08:49

## 2024-09-20 ASSESSMENT — ACTIVITIES OF DAILY LIVING (ADL)
ADLS_ACUITY_SCORE: 41
ADLS_ACUITY_SCORE: 41
ADLS_ACUITY_SCORE: 32
ADLS_ACUITY_SCORE: 32
ADLS_ACUITY_SCORE: 41
ADLS_ACUITY_SCORE: 33
ADLS_ACUITY_SCORE: 32
ADLS_ACUITY_SCORE: 41
ADLS_ACUITY_SCORE: 41
ADLS_ACUITY_SCORE: 40
ADLS_ACUITY_SCORE: 41

## 2024-09-20 NOTE — PLAN OF CARE
Physical Therapy Discharge Summary    Reason for therapy discharge:    Discharged to transitional care facility.    Progress towards therapy goal(s). See goals on Care Plan in [facility] electronic health record for goal details.  Goals not met.  Barriers to achieving goals:   discharge from facility.    Therapy recommendation(s):    Continued therapy is recommended.  Rationale/Recommendations:  TCU recommended for further progression of strength and IND mobility.

## 2024-09-20 NOTE — PROGRESS NOTES
Care Management Discharge Note    Discharge Date: 09/20/2024       Discharge Disposition: Skilled Nursing Facility, Transitional Care    Discharge Services: None    Discharge DME: None    Discharge Transportation: agency    Private pay costs discussed: transportation costs    Does the patient's insurance plan have a 3 day qualifying hospital stay waiver?  Yes     Which insurance plan 3 day waiver is available? Alternative insurance waiver    Will the waiver be used for post-acute placement? No    PAS Confirmation Code: [id number]  Patient/family educated on Medicare website which has current facility and service quality ratings: yes    Education Provided on the Discharge Plan: Yes  Persons Notified of Discharge Plans: patient, family  Patient/Family in Agreement with the Plan: yes    Handoff Referral Completed: Yes, [facility] PCP: Internal handoff referral completed    Additional Information:  Patient discharging to [facility].  transport set up for today at [phone]. Care team updated.   Discharge orders faxed to [facility]. Updated admissions that orders faxed and transport time.   Updated patient and family at bedside of transport time.     Trudy Randhawa RN  Care Coordinator  [facility]

## 2024-09-20 NOTE — PROGRESS NOTES
"Podiatry / Foot and Ankle Surgery Progress Note    September 20, 2024    Subject: Patient was seen at bedside for right foot. Had further debridement on 9/14. Hospital course complicated by retroperitoneal bleeding. Currently patient states he feels well and that hip pain is improved. Sitting up and eating breakfast.     Objective:  Vitals: BP (!) 141/59 (BP Location: Left arm)   Pulse 69   Temp 97.3  F (36.3  C) (Temporal)   Resp 20   Ht 1.905 m (6' 3\")   Wt 98.3 kg (216 lb 12.8 oz)   SpO2 96%   BMI 27.10 kg/m    BMI= Body mass index is 27.1 kg/m .    WBC Count   Date Value Ref Range Status   09/20/2024 16.4 (H) 4.0 - 11.0 10e3/uL Final     A1C: 5.2 (9/9/2024)    General:  Patient is alert and orientated.  NAD.    Vascular:  DP and PT pulses are palpable.  No varicosities noted  CFT's < 3secs.  Skin temp is normal.     Neuro:  Light and gross touch sensation diminished to feet.    Derm: Outter layer of dressing removed. No cellulitis to foot or leg. Pictures reviewed from wound vac changes, wound bed granular dorsally and filling in. All incisions intact without dehiscence.    Musculoskeletal:  Previously amputated right great toe and partial right 3rd toe.     Imaging: right foot post op xray -I personally reviewed images.  Partial first ray amputation through the distal first  metatarsal. Additional partial amputation of the third toe through the  distal middle phalanx. Expected postoperative soft tissue edema and  scattered foci of gas    Cultures:      3+ Streptococcus intermedius Abnormal        Assessment: 83 yr old male with chronic kidney disease, s/p partial right 1st ray and partial right 3rd toe amputations and repeat Excisional debridement right foot wound on 9/14    Plan:    -Patient seen at bedside for follow up on right foot.   -Noted increase in wbc following surgery, suspect reactive due to retroperitoneal bleed and left sided hip fracture.   -Right foot appears to be healing and without " signs of infection.   -On augmentin for foot infection following amputation and debridement.   -Cont wound vac to right side. Cont to WB to heel.   -Okay to discharge when medically stable. Will sign off. Vocera text with questions.       LINDA ReynosoM

## 2024-09-20 NOTE — CARE PLAN
09/20/24 1208   Fall Event   Patient Assessed By nurse;provider   Name of Provider Notified Dr. Gates   Family/Designated Caregiver Notified of Fall Yes   Fall Prevention Plan Updated Yes   Name of Family/Designated Caregiver Notified Son in room-Lane

## 2024-09-20 NOTE — PLAN OF CARE
Occupational Therapy Discharge Summary    Reason for therapy discharge:    Discharged to transitional care facility.    Progress towards therapy goal(s). See goals on Care Plan in [facility] electronic health record for goal details.  Goals partially met.  Barriers to achieving goals:   discharge from facility.    Therapy recommendation(s):    Continued therapy is recommended.  Rationale/Recommendations:  recommend ongoing skilled OT at TCU.

## 2024-09-20 NOTE — PLAN OF CARE
"CARE FROM [id number]    /58 (BP Location: Right arm)   Pulse 55   Temp 98.1  F (36.7  C) (Temporal)   Resp 18   Ht 1.905 m (6' 3\")   Wt 98.3 kg (216 lb 12.8 oz)   SpO2 96%   BMI 27.10 kg/m       Goal Outcome Evaluation:      Plan of Care Reviewed With: patient, child    Overall Patient Progress: improvingOverall Patient Progress: improving    Outcome Evaluation: A&O x4, with intermittent confusion and forgetfulness noted. VSS, RA. Tele. Up w/ A2 gbw and orthotic boot. Voiding adequately throughout night-- 0630  mls. RLE elevated w/ pillows. Wound VAC in place. Recieved tyl for mild pain-- effective per pt report. Plan to discharge to TCU today.    Problem: Adult Inpatient Plan of Care  Goal: Plan of Care Review  Description: The Plan of Care Review/Shift note should be completed every shift.  The Outcome Evaluation is a brief statement about your assessment that the patient is improving, declining, or no change.  This information will be displayed automatically on your shift  note.  Outcome: Progressing  Flowsheets (Taken 9/20/2024 0137)  Outcome Evaluation: A&O x4, with intermittent confusion and forgetfulness noted. VSS, RA. Tele. Up w/ A2 gbw and orthotic boot. Voiding. RLE elevated w/ pillows. Wound VAC in place. Recieved tyl for mild pain-- effective per pt report. Plan for discharge to TCU today.  Plan of Care Reviewed With:   patient   child  Overall Patient Progress: improving  Goal: Patient-Specific Goal (Individualized)  Description: You can add care plan individualizations to a care plan. Examples of Individualization might be:  \"Parent requests to be called daily at 9am for status\", \"I have a hard time hearing out of my right ear\", or \"Do not touch me to wake me up as it startles  me\".  Outcome: Progressing  Goal: Absence of Hospital-Acquired Illness or Injury  Outcome: Progressing  Intervention: Identify and Manage Fall Risk  Recent Flowsheet Documentation  Taken 9/19/2024 2143 by " Hina Leonardo, RN  Safety Promotion/Fall Prevention:   activity supervised   assistive device/personal items within reach   clutter free environment maintained   lighting adjusted   mobility aid in reach   nonskid shoes/slippers when out of bed   patient and family education   safety round/check completed   supervised activity   treat reversible contributory factors   treat underlying cause  Goal: Optimal Comfort and Wellbeing  Outcome: Progressing  Intervention: Monitor Pain and Promote Comfort  Recent Flowsheet Documentation  Taken 9/19/2024 2106 by Hina Leonardo, RN  Pain Management Interventions: medication (see MAR)  Goal: Readiness for Transition of Care  Outcome: Progressing

## 2024-09-20 NOTE — PLAN OF CARE
"Pt A&O x4-forgetful @ times. Denies pain. CMS: baseline neuropathy to BLEs. Dressing changed by podiatry-[facility]. Tubing for wound vac clamped for transport. Up w/ Ax1-2 using gait belt, and walker. CAM boot on when OOB. HWB. Voiding. Tolerating regular diet.     Reviewed discharge instructions with patient and son. Questions answered. [facility], and belongings.    Problem: Adult Inpatient Plan of Care  Goal: Plan of Care Review  Description: The Plan of Care Review/Shift note should be completed every shift.  The Outcome Evaluation is a brief statement about your assessment that the patient is improving, declining, or no change.  This information will be displayed automatically on your shift  note.  Outcome: Adequate for Care Transition  Goal: Patient-Specific Goal (Individualized)  Description: You can add care plan individualizations to a care plan. Examples of Individualization might be:  \"Parent requests to be called daily at 9am for status\", \"I have a hard time hearing out of my right ear\", or \"Do not touch me to wake me up as it startles  me\".  Outcome: Adequate for Care Transition  Goal: Absence of Hospital-Acquired Illness or Injury  Outcome: Adequate for Care Transition  Intervention: Identify and Manage Fall Risk  Recent Flowsheet Documentation  Taken 9/20/2024 0858 by Maru Lan RN  Safety Promotion/Fall Prevention:   activity supervised   assistive device/personal items within reach   nonskid shoes/slippers when out of bed   safety round/check completed  Intervention: Prevent Skin Injury  Recent Flowsheet Documentation  Taken 9/20/2024 0858 by Maru Lan, RN  Skin Protection: incontinence pads utilized  Device Skin Pressure Protection: positioning supports utilized  Intervention: Prevent and Manage VTE (Venous Thromboembolism) Risk  Recent Flowsheet Documentation  Taken 9/20/2024 0858 by Maur Lan, DIANA  VTE " Prevention/Management: SCDs on (sequential compression devices)  Intervention: Prevent Infection  Recent Flowsheet Documentation  Taken 9/20/2024 0858 by Maru Lan RN  Infection Prevention: rest/sleep promoted  Goal: Optimal Comfort and Wellbeing  Outcome: Adequate for Care Transition  Goal: Readiness for Transition of Care  Outcome: Adequate for Care Transition     Problem: Infection  Goal: Absence of Infection Signs and Symptoms  Outcome: Adequate for Care Transition     Problem: Fall Injury Risk  Goal: Absence of Fall and Fall-Related Injury  Outcome: Adequate for Care Transition  Intervention: Identify and Manage Contributors  Recent Flowsheet Documentation  Taken 9/20/2024 0858 by Maru Lan RN  Medication Review/Management: medications reviewed  Intervention: Promote Injury-Free Environment  Recent Flowsheet Documentation  Taken 9/20/2024 0858 by Maru Lan RN  Safety Promotion/Fall Prevention:   activity supervised   assistive device/personal items within reach   nonskid shoes/slippers when out of bed   safety round/check completed     Problem: Skin Injury Risk Increased  Goal: Skin Health and Integrity  Outcome: Adequate for Care Transition  Intervention: Optimize Skin Protection  Recent Flowsheet Documentation  Taken 9/20/2024 0858 by Maru Lan RN  Pressure Reduction Techniques: frequent weight shift encouraged  Pressure Reduction Devices: positioning supports utilized  Skin Protection: incontinence pads utilized  Activity Management: ambulated to bathroom  Intervention: Promote and Optimize Oral Intake  Recent Flowsheet Documentation  Taken 9/20/2024 0858 by Maru Lan RN  Oral Nutrition Promotion: physical activity promoted     Problem: Comorbidity Management  Goal: Blood Pressure in Desired Range  Outcome: Adequate for Care Transition  Intervention: Maintain Blood Pressure Management  Recent Flowsheet Documentation  Taken 9/20/2024 0858 by Maru Lan  RN  Medication Review/Management: medications reviewed   Goal Outcome Evaluation:

## 2024-09-20 NOTE — PROGRESS NOTES
[facility]    Infectious Disease Progress Note    Date of Service: 09/20/2024     Assessment:  83YM with multiple medical conditions, who was admitted on 9/9 following a fall , and was subsequently found to have right foot cellulitis with osteomyelitis of the sesamoid and 3rd digit. He is s/p right partial first ray excision and partial 3rd digit amputation on 9/12, and excisional debridement of the right foot on 9/14. Cxs grew strep intermedius, actinomyces sp and corynebacteria sp. Course has been complicated by new left hip pain related to a retroperitoneal hematoma of the psoas muscle and leukocytosis is reactive.     -Right foot / leg cellulitis resolved  -Right foot infection/osteomyelitis of the right foot. S/p right partial first ray excision and partial 3rd digit amputation on 9/12, and excisional debridement of the right foot on 9/14. Cxs grew strep intermedius, actinomyces sp and corynebacteria sp.  -Retroperitoneal bleed within the left psoas muscle causing left hip pain  -Leukocytosis appears to be reactive and is improving  -Mechanical fall with multiple injuries - left 3rd finger dislocation, mildly displaced fractures of the sacrum at S5 and coccyx 1 level, retroperitoneal bleed.  -Idiopathic peripheral neuropathy  -Chronic medical conditions - hypertension, hypercholesteremia, atrial fibrillation/flutter s/p ablation , CKD , cognitive impairment, history of bladder cancer,  bilateral renal masses s/p cryoablation         Recommendations:  Leukocytosis is improving and is likely reactive to retroperitoneal bleed  Continue Augmentin for another  6 days then discontinue  Further follow up with podiatry  Discharge is planned today, ID will sign off    Ellen Donovan MD    Interval History   Feels better, no new complaints, tolerating antibiotics without side effects. Foot pain is controlled, leukocytosis is improving      Physical Exam   Temp: 97.3  F (36.3  C) Temp src: Temporal BP:  (!) 141/59 Pulse: 69   Resp: 20 SpO2: 96 % O2 Device: None (Room air)    Vitals:    09/10/24 0036 09/10/24 2146   Weight: 94 kg (207 lb 3.7 oz) 98.3 kg (216 lb 12.8 oz)     Vital Signs with Ranges  Temp:  [97.3  F (36.3  C)-98.1  F (36.7  C)] 97.3  F (36.3  C)  Pulse:  [55-69] 69  Resp:  [18-20] 20  BP: (115-141)/(54-66) 141/59  SpO2:  [96 %] 96 %    Constitutional: Awake, alert, cooperative, no apparent distress  Lungs: non labored breathing  Skin: No rash  MS : R foot in dressing    Other:    Medications   Current Facility-Administered Medications   Medication Dose Route Frequency Provider Last Rate Last Admin     Current Facility-Administered Medications   Medication Dose Route Frequency Provider Last Rate Last Admin    amoxicillin-clavulanate (AUGMENTIN) 875-125 MG per tablet 1 tablet  1 tablet Oral Q12H [facility] (08/20) Irene Ramirez MD   1 tablet at 09/20/24 0844    [Held by provider] enoxaparin ANTICOAGULANT (LOVENOX) injection 80 mg  80 mg Subcutaneous Q12H Irene Ramirez MD   80 mg at 09/17/24 1004    famotidine (PEPCID) tablet 20 mg  20 mg Oral Daily Catarina Almendarez DPM, Podiatry/Foot and Ankle Surgery   20 mg at 09/20/24 0844    multivitamin, therapeutic (THERA-VIT) tablet 1 tablet  1 tablet Oral Daily Irene Ramirez MD   1 tablet at 09/20/24 0849    polyethylene glycol (MIRALAX) Packet 17 g  17 g Oral Daily Catarina Almendarez DPM, Podiatry/Foot and Ankle Surgery   17 g at 09/20/24 0844    rosuvastatin (CRESTOR) tablet 10 mg  10 mg Oral Daily Catarina Almendarez DPM, Podiatry/Foot and Ankle Surgery   10 mg at 09/20/24 0844    senna-docusate (SENOKOT-S/PERICOLACE) 8.6-50 MG per tablet 1 tablet  1 tablet Oral BID Tanesha, Catarina J, DPM, Podiatry/Foot and Ankle Surgery   1 tablet at 09/20/24 0844    sodium chloride (PF) 0.9% PF flush 3 mL  3 mL Intracatheter Q8H Catarina Almendarez DPM, Podiatry/Foot and Ankle Surgery   3 mL at 09/20/24 0846    tamsulosin (FLOMAX) capsule 0.4 mg  0.4 mg Oral Daily Bradford Gates MD   0.4 mg at  09/20/24 0844    [Held by provider] Warfarin Dose Required Daily - Pharmacist Managed  1 each Oral See Admin Instructions Irene Ramirez MD           Data   All microbiology laboratory data reviewed.  Recent Labs   Lab Test 09/20/24  0708 09/19/24  0910 09/18/24  1417   WBC 16.4* 22.7* 24.9*   HGB 8.4* 8.8* 9.9*   HCT 26.5* 28.0* 31.5*   MCV 91 92 91    358 374     Recent Labs   Lab Test 09/19/24  0910 09/18/24  0626 09/17/24 2025   CR 1.60* 1.64* 1.54*     Recent Labs   Lab Test 09/10/24  0243   SED 14

## 2024-09-20 NOTE — PHARMACY-ANTICOAGULATION SERVICE
Clinical Pharmacy- Warfarin Discharge Note  This patient is currently on warfarin for the treatment of Atrial fibrillation.  INR Goal= 2-3  Expected length of therapy lifetime.    Warfarin PTA Regimen: 5mg daily      Anticoagulation Dose History  More data exists         Latest Ref Rng & Units 9/14/2024 9/15/2024 9/16/2024 9/17/2024 9/18/2024 9/19/2024 9/20/2024   Recent Dosing and Labs   warfarin ANTICOAGULANT (COUMADIN) 5 MG tablet - 5 mg, $Given - 5 mg, $Given - - - -   INR 0.85 - 1.15 1.91  1.60  1.42  1.52  1.56  1.50  1.45  1.33       Details          Multiple values from one day are sorted in reverse-chronological order               Vitamin K doses administered during the last 7 days: none    Reviewed PTA, inpt and discharge meds.  Pt on warfarin PTA for afib.  Has been held this admission due to retorperitoneal bleed.  Per MD note, cont to hold warfarin on discharge, can be resumed at later date assuming hgb remains stable.

## 2024-09-20 NOTE — PROGRESS NOTES
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  Wound vac applied started 9/16.  Changed VAC today.      Patient History (according to provider note(s):      Summary of Stay: Gregory Zhong is a 83 year old male with a history of htn/hlp, AF/flutter s/p ablation chronically on warfarin, most recent echo 2020 with EF 60% with benedicto, CKD 3b with baseline creat 1.7-1.9, mild cognitive impairment, hx of bladder cancer and bilateral renal masses s/p cryoablation, chronic right foot ulcer admitted on 9/9/2024 with a mechanical fall and found to have sepsis relate to unknown Right foot/leg cellulitis around his chronic ulcer and a dislocated left 3rd finger     Areas Assessed:      Areas visualized during today's visit: RLE    Negative pressure wound therapy applied to: Right dorsal foot     Last photo: 9-20-24 9-20-24 plantar/medial foot       Wound due to: Surgical Wound  Wound history/plan of care: chronic ulcers per report, hx idiopathic neuropathy  Surgical date: 9-14-24 Dr. Almendarez DPM   Service following: [facility], Podiatry  Date Negative Pressure Wound Therapy initiated: 9-16-24   Interventions in place: offloading and elevation  Is patient s nutritional status compromised? no   If yes, what interventions are in place? N/A  Reason for initiating vac therapy?  High risk of infections, and Need for accelerated granulation tissue  Which?of?the?following?co-morbidities?apply? N/A  If diabetic is patient on a diabetic management program? N/A   Is osteomyelitis present in wound? no   If yes what treatments are in place? N/A  Wound base: 80% moist red tissue, 20% fibrin and subcutaneous tissue     Palpation of the wound bed: normal      Drainage: small     Description of drainage: serosanguinous     Measurements (length x width x depth, in  "cm): 2.5 x 2.2 x 1cm     Tunneling: N/A     Undermining: N/A  Periwound skin: mostly intact, several areas of sutures adjacent to wound      Color: pink      Temperature: normal   Odor: none  Pain: mild, tender  Pain interventions prior to dressing change: slow and gentle cares   Treatment goal: Heal , Drainage control, Infection control/prevention, and Increase granulation  STATUS: evolving  Supplies ordered: at bedside       Number of foam pieces removed from a wound (excluding foam for bridge) : 1 GranuFoam   Verified this matched the number of foam pieces applied last dressing change: Yes  Number of foam pieces packed into wound (excluding foam for bridge) : 1 GranuFoam Black      Treatment Plan:     Negative pressure wound therapy plan:  Wound location: Right dorsal foot   Change Days: Mon/Wed/Fri by WOC RN    Supplies (including all accessories) used: small  Black foam   Cleanse with Vashe prior to replacing NPWT  Suction setting: -125   Methods used: Bridged trac pad off bony prominences and Placed barrier ring into periwound creases to improve seal    Staff RN to assess integrity of dressing and ensure suction is set at appropriate level every shift.   Date canister. Chart canister output every shift. Change cannister weekly and PRN if full/occluded     Remove foam dressing and replace with BID normal saline moist gauze dressing if:   -a dressing failure which cannot be repaired within 2 hours   -patient is discharging to home without a home pump   -patient is discharging to a facility outside the local area   -if a dressing is a \"Silver Foam\", remove before Radiation Therapy or MRI     The hospital VAC pump is not to be discharged with the patient.?Ensure to disconnect patient from machine prior to discharge. Then,    - If a home KCI VAC pump has been delivered, connect home cannister to dressing tubing then connect cannister to home pump and turn on machine    - If transferring to a nearby facility with a " KCI vac, can disconnect and clamp tubing then cover end with glove so can be reconnected within 2 hours        Orders: Reviewed    RECOMMEND PRIMARY TEAM ORDER: None, at this time  Education provided: plan of care  Discussed plan of care with: Patient, Family, and Nurse  WO nurse follow-up plan: Monday/WednesdayFriday  Notify WOC if wound(s) deteriorate.  Nursing to notify the Provider(s) and re-consult the WOC Nurse if new skin concern.    DATA:     Current support surface: Standard  Standard gel mattress (Isoflex)  BMI: Body mass index is 27.1 kg/m .   Active diet order: Orders Placed This Encounter      Regular Diet Adult      Diet     Output: I/O last 3 completed shifts:  In: 120 [P.O.:120]  Out: 2095 [Urine:2095]     Labs:   Recent Labs   Lab 09/20/24  0708   HGB 8.4*   INR 1.33*   WBC 16.4*     Pressure injury risk assessment:   Sensory Perception: 3-->slightly limited  Moisture: 3-->occasionally moist  Activity: 3-->walks occasionally  Mobility: 3-->slightly limited  Nutrition: 3-->adequate  Friction and Shear: 2-->potential problem  Ty Score: 17    Cecil Albert RN CWOCN  Contact Via [facility] Nurse (Natalie)  Dept. Office Number: [phone]

## 2024-09-20 NOTE — DISCHARGE SUMMARY
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    Weight as of this encounter: 98.3 kg (216 lb 12.8 oz).       Follow-ups Needed After Discharge   Follow-up Appointments     Follow Up and recommended labs and tests      Follow up with Nedra Workman DPM at the [facility], located at   [address] [place] [address] [address], [place] [address]. Phone number is   [phone]. Call to schedule appt in 1-2 weeks.        Follow Up and recommended labs and tests      Follow up with jail physician.  The following labs/tests are   recommended: CBC, BMP  in 3-4 days .    Patient had been on metoprolol and warfarin for atrial fibrillation   -metoprolol stopped because of sinus pauses and warfarin was stopped   because of retroperitoneal hematoma    Beta-blockade could be restarted with cautious monitoring of heart rates  Warfarin could be restarted later if hemoglobin is stable            Unresulted Labs Ordered in the Past 30 Days of this Admission       Date and Time Order Name Status Description    9/14/2024  8:16 AM Fungal " or Yeast Culture Routine Preliminary     9/14/2024  8:16 AM Anaerobic Bacterial Culture Routine Preliminary         These results will be followed up by [facility]      Discharge Disposition   Discharged to transitional care unit  Condition at discharge: Good    Hospital Course   Gregory Zhong is a 83 year old gentleman who lives alone and is home with hypertension, hypercholesteremia, atrial fibrillation/flutter s/p ablation chronically on warfarin therapy stage III CKD with baseline creatinine around 1.7-1.9, mild cognitive impairment, history of bladder cancer bilateral renal masses s/p cryoablation, chronic right foot ulceration was admitted to [facility] 9/9/2024 with a mechanical fall when he slipped and fell without loss of consciousness and was found to have sepsis related to unknown right foot/leg cellulitis around his chronic ulceration and dislocation of the third left finger.  His potassium was elevated at 6.1 creatinine was 1.66 calcium was elevated at 10.6 CRP was 72.4.  Left finger dislocation was reduced in the emergency department and a splint was applied.  He was started on Zosyn.  On 9/12/2024 he underwent right foot partial first ray resection, 3rd digit amputation, with excision dry debridement down to and including tendon of the right foot by Nedra Workman DPM.  On 9/14/2024 patient had right foot excision and debridement for the ulceration of the right foot with fat layer exposed byCatarina Almendarez DPM, Podiatry.  Culture from the wound grew Streptococcus intermedius wound VAC was applied.  Patient had bradycardia and pauses in the setting of beta-blocker therapy which has been weaned off.  On 9/17/2024 patient had significant left hip pain and his heart rate jumped in 140s without any antecedent trauma CT scan of the abdomen without contrast done on 9/17/2024 showed partially imaged moderate retroperitoneal hematoma within the psoas muscle and mildly  displaced transversely oriented fracture of the sacrum at the S5 and C1 level there was no hip fracture seen.   He had urinary retention on 9/19/2024 for which Flomax was started    Retroperitoneal bleed  This was seen on noncontrast CT of the abdomen  With CKD contrast was not used as this would not change the diagnosis  Warfarin and Lovenox are on hold -this was discussed with the patient     Orthostatic hypotension  Patient's systolic blood pressure decreased from 113/63 to 72/41  500 mL of normal saline bolus will be given  Probably on the basis of orthostatic hypotension patient is having falls     Right leg/foot cellulitis  Chronic right plantar ulcer of unclear duration   Idiopathic Peripheral neuropathy with impaired sensation   -Blood culture positive on 2nd day of incubation-corynebacterium consistent with contaminant   -Underwent I and D with right foot with partial 1st ray amputation and partial 3rd amputation 9/12, culture results with 1+ GPB, 2+ GPC. Repeat I&D 9/14  -wound cultures: strep intermedius, shaalia odontolytica (prior actinomycetes), and most recent with corynebacterium   -MRSA swab negative  -rec;d single dose of vanco on admission, on day 7 pip-tazo.  Duration of treatment TBD       Consultations This Hospital Stay   PHARMACY TO DOSE VANCO  PHARMACY TO DOSE VANCO  PODIATRY IP CONSULT  ORTHOPEDIC SURGERY IP CONSULT  PHYSICAL THERAPY ADULT IP CONSULT  CARE MANAGEMENT / SOCIAL WORK IP CONSULT  OCCUPATIONAL THERAPY ADULT IP CONSULT  PHARMACY TO DOSE WARFARIN  WOUND OSTOMY CONTINENCE NURSE  IP CONSULT  CARE MANAGEMENT / SOCIAL WORK IP CONSULT  PHARMACY TO DOSE WARFARIN  SPIRITUAL HEALTH SERVICES IP CONSULT  INFECTIOUS DISEASES IP CONSULT  PHYSICAL THERAPY ADULT IP CONSULT  OCCUPATIONAL THERAPY ADULT IP CONSULT    Code Status   Full Code    Time Spent on this Encounter   Bradford SANTANA MD, personally saw the patient today and spent greater than 30 minutes discharging this patient.       Bradford  MELIZA Gates MD  [facility]TH[facility]  [address]  [facility] [address]  Phone: [phone]  Fax: [phone]  ______________________________________________________________________    Physical Exam   Vital Signs: Temp: 97.3  F (36.3  C) Temp src: Temporal BP: (!) 141/59 Pulse: 69   Resp: 20 SpO2: 96 % O2 Device: None (Room air)    Weight: 216 lbs 12.8 oz  GENERAL: Patient is not in acute distress  HEENT: EOM+,Conjunctiva is clear   NECK:  no Jugular Venous distention  HEART: S1 S2 regular Rate and Rhythm, there is  no murmur,   LUNGS: Respirations are not laboured, Lungs decreased breath sounds in the lung bases to auscultation without Crepitations or Wheezing   ABDOMEN: Soft , there is no tenderness, Bowel Sounds are Positive   LOWER LIMBS: I did not open the dressing on the right lower extremity  CNS:  Alert,  Oriented x 3, Moving all the Four Limbs        Primary Care Physician   [facility] [place], operated by [facility]    Discharge Orders      Follow Up and recommended labs and tests    Follow up with Nedra Workman DPM at the [facility], located at [address], [place] [address]. Phone number is [phone]. Call to schedule appt in 1-2 weeks.     Weight bearing status    WB to heel only, in CAM boot. Elevate while at rest. Place ice behind knee for 15 minutes at a time, for swelling or pain.  Leave dressing to foot  at all times. Do not get wet in the shower.     General info for SNF    Length of Stay Estimate: Short Term Care: Estimated # of Days <30  Condition at Discharge: Improving  Level of care:skilled   Rehabilitation Potential: Good  Admission H&P remains valid and up-to-date: Yes  Recent Chemotherapy: N/A  Use Nursing Home Standing Orders: Yes     Mantoux instructions    Give two-step Mantoux (PPD) Per Facility Policy Yes     Follow Up and recommended labs and tests    Follow up with [facility] physician.  The following labs/tests are recommended: CBC,  BMP  in 3-4 days .    Patient had been on metoprolol and warfarin for atrial fibrillation -metoprolol stopped because of sinus pauses and warfarin was stopped because of retroperitoneal hematoma    Beta-blockade could be restarted with cautious monitoring of heart rates  Warfarin could be restarted later if hemoglobin is stable     Reason for your hospital stay    dmitted to [facility] 9/9/2024 with a mechanical fall when he slipped and fell without loss of consciousness and was found to have sepsis related to unknown right foot/leg cellulitis around his chronic ulceration and dislocation of the third left finger.     Activity - Up with assistive device     Activity - Up with nursing assistance     Physical Therapy Adult Consult    Evaluate and treat as clinically indicated.    Reason: Generalized weakness, history of fall, right foot surgery but can bear weight on right heel     Occupational Therapy Adult Consult    Evaluate and treat as clinically indicated.    Reason:  Generalized weakness, history of fall, right foot surgery but can bear weight on right heel     Fall precautions     Diet    Follow this diet upon discharge: Current Diet:Orders Placed This Encounter      Snacks/Supplements Adult: Ensure Enlive; With Meals      Regular Diet Adult       Significant Results and Procedures   Most Recent 3 CBC's:  Recent Labs   Lab Test 09/20/24  0708 09/19/24  0910 09/18/24  1417   WBC 16.4* 22.7* 24.9*   HGB 8.4* 8.8* 9.9*   MCV 91 92 91    358 374     Most Recent 3 BMP's:  Recent Labs   Lab Test 09/19/24  0910 09/18/24  0626 09/17/24 2025    140 139   POTASSIUM 4.2 4.2 4.4   CHLORIDE 108* 107 106   CO2 22 18* 19*   BUN 29.4* 25.2* 24.4*   CR 1.60* 1.64* 1.54*   ANIONGAP 10 15 14   DAVID 9.7 10.2 9.8   * 130* 138*     Most Recent 2 LFT's:  Recent Labs   Lab Test 09/09/24  2157   AST 25   ALT 26   ALKPHOS 116   BILITOTAL 0.6     Most Recent 3 INR's:  Recent Labs   Lab Test  09/20/24  0708 09/19/24  0910 09/18/24  0626   INR 1.33* 1.45* 1.50*     7-Day Micro Results       No results found for the last 168 hours.          Most Recent Urinalysis:  Recent Labs   Lab Test 09/18/24  1256   COLOR Yellow   APPEARANCE Clear   URINEGLC Negative   URINEBILI Negative   URINEKETONE Negative   SG 1.026   UBLD Negative   URINEPH 6.0   PROTEIN 20*   NITRITE Negative   LEUKEST Negative   RBCU 1   WBCU 1   ,   Results for orders placed or performed during the hospital encounter of 09/09/24   Fingers XR, 2-3 views, left    Narrative    EXAM: XR FINGER LEFT G/E 2 VIEWS  LOCATION: [facility]  DATE: 9/9/2024    INDICATION: Suspected dislocation after fall.  COMPARISON: None available.      Impression    IMPRESSION: Severe ulnar and posterior subluxation of the left long finger middle phalanx at the proximal interphalangeal joint.  A definitive fracture is not identified. Attention on postreduction imaging.    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 Mild polyarticular osteoarthritis throughout the remainder of the left hand and wrist. Healed, instrumented fracture of the second metacarpal shaft.     Head CT w/o contrast    Narrative    EXAM: CT HEAD W/O CONTRAST  LOCATION: [facility]  DATE: 9/9/2024    INDICATION: fall, nose bloody, blood thinners  COMPARISON: 7/31/2024.  TECHNIQUE: Routine CT Head without IV contrast. Multiplanar reformats. Dose reduction techniques were used.    FINDINGS:  INTRACRANIAL CONTENTS: No intracranial hemorrhage, extraaxial collection, or mass effect.  No CT evidence of acute infarct. There is scattered low-attenuation within the periventricular and subcortical white matter consistent with diffuse small vessel   ischemic disease. The ventricular system, basal cisterns and cortical sulci are consistent with volume loss.     VISUALIZED ORBITS/SINUSES/MASTOIDS: No intraorbital abnormality.  No paranasal sinus mucosal disease. No middle ear or mastoid effusion.    BONES/SOFT TISSUES: No acute abnormality.      Impression    IMPRESSION:  1.  No CT finding of a mass, hemorrhage or focal area suggestive of acute infarct.  2.  Stable diffuse age related changes.   US Lower Extremity Venous Duplex Right    Narrative    EXAM: US LOWER EXTREMITY VENOUS DUPLEX RIGHT  LOCATION: [facility]  DATE: 9/10/2024    INDICATION: Leg swelling and infection  COMPARISON: None.  TECHNIQUE: Venous Duplex ultrasound of the right lower extremity with and without compression, augmentation and duplex. Color flow and spectral Doppler with waveform analysis performed.    FINDINGS: Exam includes the common femoral, femoral, popliteal, and contralateral common femoral veins as well as segmentally visualized deep calf veins and greater saphenous vein.     RIGHT: No deep vein thrombosis. No superficial thrombophlebitis. No popliteal cyst. Soft tissue edema noted in the right calf.      Impression    IMPRESSION:  1.  No deep venous thrombosis in the right lower extremity.   Fingers XR, 2-3 views, left    Narrative    EXAM: XR FINGER LEFT G/E 2 VIEWS  LOCATION: [facility]  DATE: 9/10/2024    INDICATION: Post reduction  COMPARISON: 09/09/2024      Impression    IMPRESSION: The dislocated middle phalanx of the left third finger has been reduced and now properly articulates at the PIP joint. No fracture. 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 Erosive osteoarthritis at the DIP joint of   the fifth finger. Internal fixation screws left second metacarpal.     MR Foot Right w/o Contrast    Narrative    Exam: MRI of the right foot without contrast dated 9/10/2024.    COMPARISON: None.    CLINICAL HISTORY: Plantar foot ulcer with sepsis.    TECHNIQUE: Multiplanar, multisequence MR imaging of the right foot was  obtained using standard sequences in 3 orthogonal planes  without the  use of intravenous or intra-articular gadolinium and contrast.    FINDINGS:    There is an ulcerative defect along the plantar aspect of the distal  first metatarsal. Adjacent to the ulcer, there is low T1 signal with  increased T2 signal in the tibial greater than fibular sesamoid.  Contrast was not administered. Mild increased T2 signal without  increased T1 signal in the great toe proximal phalanx.    Low T1 signal with increased T2 signal in the distal aspect of the  right third toe to phalanx, adjacent to a soft tissue defect.    Atrophy within the musculature about the forefoot with diffuse edema  within the musculature. No discrete abscess although contrast was not  administered. There is diffuse subcutaneous soft tissue edema.    No full-thickness tendon tear or tendon retraction. The Lisfranc  ligament is intact.      Impression    IMPRESSION:  1. Ulcerative lesion along the plantar aspect of the distal first  metatarsal. There is low T1 signal, with increased T2 signal in the  adjacent sesamoids, tibial greater than fibular, MRI findings most  consistent with osteomyelitis. There is subtle increased bone marrow  edema in the great toe proximal phalanx without low T1 signal,  presumed reactive osteitis.    2. Low T1 signal with increased T2 signal in the distal aspect of the  third toe distal phalanx, MRI findings most consistent with  osteomyelitis, this is adjacent to a soft tissue defect, sagittal  series 4 image 9.    3. Diffuse soft tissue edema within the musculature, likely  representing myositis. No discrete abscess although contrast was not  administered.    4. Diffuse subcutaneous soft tissue edema along dorsal aspect of the  foot, findings most consistent with cellulitis.    SUGEY SKAGGS MD         SYSTEM ID:  [id number]   US DAVID Doppler No Exercise    Narrative    IR DAVID US DAVID DOPPLER NO EXERCISE, 1-2 LEVELS, BILAT   9/10/2024 3:12  PM     HISTORY: Right foot ulcer, evaluate for  PAD    COMPARISON: None.    FINDINGS:  Right DAVID:   DP: 0.99  PT: 0.97.    Left DAVID:   DP: 1.01   PT: 1.10.    Waveforms: Triphasic in the distal tibial arteries      Impression    IMPRESSION: Resting ankle-brachial indices are within normal limits.    DAVID CRITERIA:  >0.95 Normal  0.90 - 0.94 Mild  0.5 - 0.89 Moderate  0.2 - 0.49 Severe  <0.2 Critical    GIL CELESTE MD         SYSTEM ID:  [id number]   XR Foot Port Right 2 Views    Narrative    EXAM: XR FOOT PORT RIGHT 2 VIEWS  DATE/TIME: 9/12/2024 1:50 PM    INDICATION: s/p partial amputations  COMPARISON: MRI 9/10/2024      Impression    IMPRESSION: Partial first ray amputation through the distal first  metatarsal. Additional partial amputation of the third toe through the  distal middle phalanx. Expected postoperative soft tissue edema and  scattered foci of gas.       MEKHI MARTIN DO         SYSTEM ID:  [id number]   XR Pelvis w Hip Left 1 View    Narrative    EXAM: XR PELVIS AND HIP LEFT 1 VIEW  LOCATION: [facility]  DATE: 9/17/2024    INDICATION: acute hip pain with positioning  COMPARISON: None.      Impression    IMPRESSION: Linear lucency through the left intertrochanteric femur seen only on the AP view, which may represent a nondisplaced fracture and could be further evaluated by CT. No acute fracture identified elsewhere in the pelvis. Anatomic alignment of   the left hip. Mild degenerative arthritis of both sacroiliac joints and hips. Scattered arterial atherosclerotic calcifications.   CT Hip Left w/o Contrast    Narrative    EXAM: CT HIP LEFT W/O CONTRAST  LOCATION: [facility]  DATE: 9/17/2024    INDICATION: Acute left hip pain worse with bearing weight.  Hip x-ray with questioned non displaced fracture.  COMPARISON: Radiograph 09/17/2024  TECHNIQUE: Noncontrast. Axial, sagittal and coronal thin-section reconstruction. Dose reduction techniques were used.     FINDINGS:     BONES:  Mildly displaced  transversely oriented fractures of the sacrum at the S5 and Co1 levels.    No fracture of the left hip. Mild left hip osteoarthritis. Degenerative change of the pubic symphysis and left sacroiliac joint. Moderate L5-S1 degenerative disc disease.    SOFT TISSUES:  Partially imaged high-attenuation expansion of the psoas muscle compatible with moderate intramuscular hematoma.    No lymphadenopathy. No free fluid. Mild presacral edema.    Dilatation of the urinary bladder. Peripherally calcified central fat attenuation lesion adjacent to the sigmoid colon compatible sequela of fat necrosis. Additional calcified finding anterior to the bladder could represent a peritoneal body.      Impression    IMPRESSION:  1.  Partially imaged moderate retroperitoneal hematoma within the psoas muscle. Further evaluation with abdomen/pelvis CT is recommended.  2.  Mildly displaced transversely oriented fractures of the sacrum at the S5 and Co1 levels.  3.  No left hip fracture. Mild left hip osteoarthritis.         NOTE: ABNORMAL REPORT    THE DICTATION ABOVE DESCRIBES AN ABNORMALITY FOR WHICH FOLLOW-UP IS NEEDED.    XR Chest Port 1 View    Narrative    CHEST ONE VIEW  9/18/2024 2:23 PM     HISTORY: increased WBC    COMPARISON: None.      Impression    IMPRESSION: No acute disease.    ALYSE ROOT MD         SYSTEM ID:  [id number]       Discharge Medications   Current Discharge Medication List        START taking these medications    Details   polyethylene glycol (MIRALAX) 17 GM/Dose powder Take 17 g by mouth daily.  Qty: 510 g, Refills: 0    Associated Diagnoses: Constipation, unspecified constipation type      tamsulosin (FLOMAX) 0.4 MG capsule Take 1 capsule (0.4 mg) by mouth daily.  Qty: 30 capsule, Refills: 0    Associated Diagnoses: Urinary retention           CONTINUE these medications which have NOT CHANGED    Details   alendronate (FOSAMAX) 70 MG tablet Take 70 mg by mouth every 7 days. On Saturdays      amLODIPine (NORVASC)  10 MG tablet Take 10 mg by mouth daily.      famotidine (PEPCID) 20 MG tablet Take 20 mg by mouth 2 times daily.      hypromellose-dextran (HYPROMELLOSE-DEXTRAN 0.3-0.1%) 0.1-0.3 % ophthalmic solution Place 1-2 drops into both eyes 4 times daily as needed for dry eyes.      isosorbide mononitrate (IMDUR) 60 MG 24 hr tablet Take 60 mg by mouth daily.      Multiple Vitamins-Minerals (PRESERVISION AREDS 2) CHEW Take 1 tablet by mouth 2 times daily.      patiromer (VELTASSA) 8.4 g packet Take 8.4 g by mouth. on Mon Wed Fri at 1500      rosuvastatin (CRESTOR) 10 MG tablet Take 10 mg by mouth daily.      vitamin D3 (CHOLECALCIFEROL) 50 mcg (2000 units) tablet Take 1 tablet by mouth daily.           STOP taking these medications       metoprolol succinate ER (TOPROL XL) 25 MG 24 hr tablet Comments:   Reason for Stopping:         warfarin ANTICOAGULANT (COUMADIN) 5 MG tablet Comments:   Reason for Stopping:             Allergies   No Known Allergies

## 2024-09-20 NOTE — PROVIDER NOTIFICATION
Alfred page: Pt became light headed and fell when getting dressed to leave...it was an assisted fall so he doesnt hurt anywhere, did not hit head. We got him back to bed and BP is 120/63, HR 73...

## 2024-09-21 LAB — BACTERIA TISS BX CULT: NORMAL

## 2024-09-23 ENCOUNTER — TELEPHONE (OUTPATIENT)
Dept: PODIATRY | Facility: CLINIC | Age: 84
End: 2024-09-23
Payer: COMMERCIAL

## 2024-09-23 NOTE — TELEPHONE ENCOUNTER
Other: Patient's TCU is calling in to schedule with Dr Workman, but TCU stated patient needs to be seen for cellulitis, and provider is booked until 10/22. Can patient be added in sooner? TCU declined to leave call back number due to timeframe on call back- but stated she will be calling back in tomorrow, so please send when or where patient can be scheduled for call center.      Could we send this information to you in [employer] or would you prefer to receive a phone call?:   Patient would prefer a phone call   Okay to leave a detailed message?: Yes at Cell number on file:    No relevant phone numbers on file.

## 2024-09-23 NOTE — TELEPHONE ENCOUNTER
Patient had surgery with both Dr. Workman and Dr. Almendarez recently for the cellulitis. They need to be scheduled for a post op 1-2 weeks from discharge from the hospital unless there are new concerns for infection, etc.   They can see Dr. Workman on 10/2/24 in [place].     BRENDEN Woods RN

## 2024-09-25 NOTE — TELEPHONE ENCOUNTER
Patient has been scheduled on 10/2/24 with Dr. Workman.     Closing encounter.     BRENDEN Woods RN

## 2024-10-04 ENCOUNTER — TELEPHONE (OUTPATIENT)
Dept: PODIATRY | Facility: CLINIC | Age: 84
End: 2024-10-04
Payer: COMMERCIAL

## 2024-10-04 NOTE — TELEPHONE ENCOUNTER
Health Call Center    Phone Message    May a detailed message be left on voicemail: yes     Reason for Call:   Sarthak from [facility] asking for Call Back.  He Has Updates about Patient Recovery.(Dos  09/12 )Patient had to Reschedule His Appt.He Asking for Sooner Appt. Sarthak Has Concerns about Wound . Please Call for Details. Thanks  Action Taken: Message routed to:  Other: [facility] POD[facility]RY    Travel Screening: Not Applicable     Date of Service:

## 2024-10-04 NOTE — TELEPHONE ENCOUNTER
Phone call to DIANA Flores, TCU. She denies swelling and states it has improved and there is no redness. Drainage from wound vac is minimal to moderate.   The toe is black from the base of the toe to the nailbed.     Per Dr. Workman, they are to stop the wound vac. Her concern is that tape or dressing may have been too tight to the toe. Use Betadine to incision areas followed by gauze, gauze wrap and Ace bandage and follow up at appointment next week.   If patient shows signs of systemic infection, patient to go to the ED.     Sandra was informed of the above. She asks that orders be faxed to them. She is aware we can send a typed letter but it will not have the doctor's signature as she is not in clinic. She verbalized understanding.     Orders faxed to : [phone] and confirmed they went through via [employer].     BRENDEN Woods RN

## 2024-10-04 NOTE — TELEPHONE ENCOUNTER
Phone call to DIANA Corral, TCU. He states patient missed his appointment on 10/2/24 due to family transportation.   For the past 2 days, patient's right 3rd toe is concerning. The tip of the toe is sutured and the distal end of toe looks normal. However, the proximal section of that toe is black and necrotic. The have been covering with a dry gauze dressing and changing daily.   They have been doing wound vac dressing changes to the right 1st amputation area. The wound vac is not being used on the 3rd toe. Wound vac has been draining yellow drainage and the wound itself looks ok. Wound from 1st ray amputation has been difficult to get the wound vac on.   He denies patient having fever, chills, or nausea. The NP there recommended patient be seen as soon as able.     Will discuss with provider if she recommends patient go to the ED as we do not have any providers in next week until Tuesday.     Call back and ask for the charge nurse Nell Flores: [phone].   To fax orders: [phone].     Please advise if patient should go to the ED or what is recommended.     BRENDEN Woods RN

## 2024-10-04 NOTE — LETTER
October 4, 2024      Gregory RUPERT Zhong  [address] [facility] [address]        To Whom It May Concern,         Wound Care Orders:    Please discontinue wound vac.     Apply Betadine to incisions and wound.   Cover with gauze, gauze roll/wrap and Ace bandage.   Change daily.     If patient has signs of infection such as swelling, redness, fever, nausea, weakness, or fatigue, he should go to the Emergency Room to be evaluated.     If there are any questions, please call our office at: [phone].       Sincerely,        Nedra Workman DPM

## 2024-10-09 ENCOUNTER — OFFICE VISIT (OUTPATIENT)
Dept: PODIATRY | Facility: CLINIC | Age: 84
End: 2024-10-09
Payer: COMMERCIAL

## 2024-10-09 DIAGNOSIS — Z89.431 STATUS POST PARTIAL AMPUTATION OF RIGHT FOOT (H): ICD-10-CM

## 2024-10-09 DIAGNOSIS — Z09 SURGERY FOLLOW-UP EXAMINATION: Primary | ICD-10-CM

## 2024-10-09 DIAGNOSIS — Z89.421 STATUS POST AMPUTATION OF LESSER TOE OF RIGHT FOOT (H): ICD-10-CM

## 2024-10-09 PROCEDURE — 99024 POSTOP FOLLOW-UP VISIT: CPT | Performed by: PODIATRIST

## 2024-10-09 NOTE — PROGRESS NOTES
ASSESSMENT:  Encounter Diagnoses   Name Primary?    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  Dorsal wound is granular without clinical signs of infection.  Orders for his transitional care facility were provided:  Continue Betadine application to all incisions  Topical antibiotic to the dorsal wound  Gauze dressing  Continue offloading boot for ambulation  Follow-up as scheduled with Dr. Workman on 10/22/2024    Disclaimer: This note consists of symbols derived from keyboarding, dictation and/or voice recognition software. As a result, there may be errors in the script that have gone undetected. Please consider this when interpreting information found in this chart.    Aman Vogel DPM, FACLAUREEN, [facility] Department of Podiatry/Foot & Ankle Surgery      ____________________________________________________________________    HPI:       Daniela follows up posthospitalization from 9/9/2024 until 9/20/2024.  He was admitted with multiple diagnoses including sepsis, right lower extremity cellulitis, chronic right foot ulceration, osteomyelitis of the right sesamoids, great toe proximal phalanx and third toe.  On 9/12/2024 he underwent partial right first ray resection, third digit amputation with excisional debridement by Dr. Workman.  On 9/14/2024 he was returned to the operating room for additional excisional debridement by Dr. Almendarez.  He is currently residing at a transitional care facility.  Dr. Workman discontinued the wound VAC  Wound cares include Betadine application followed by gauze and an Ace bandage.  *  Past Medical History:   Diagnosis Date    Atrial flutter (H)     Bladder cancer (H)     Chronic atrial fibrillation (H)     Chronic kidney disease, stage III (moderate) (H)     Dyslipidemia      Gastroesophageal reflux disease     History of basal cell carcinoma     HLD (hyperlipidemia)     Hyperparathyroidism (H)     Hypertension     Mild cognitive impairment     Prostate cancer (H)     Tremor    *  *  Past Surgical History:   Procedure Laterality Date    AMPUTATE TOE(S) Right 9/12/2024    Procedure: Right foot partial first ray resection, right foot partial third digit amputation, right foot excisional debridement down to and including tendon, site measuring 7 cm x 4 cm x 2 cm;  Surgeon: Nedra Workman DPM;  Location: [facility] OR    APPENDECTOMY      Cryoablation of left and right renal masses      CYSTOSCOPY      Excision of basal cell carcinoma      IRRIGATION AND DEBRIDEMENT FOOT, COMBINED Right 9/14/2024    Procedure: Excisional debridement of necrotic tissue right foot;  Surgeon: Catarina Almendarez DPM, Podiatry/Foot and Ankle Surgery;  Location: [facility] OR    MOHS MICROGRAPHIC PROCEDURE      TONSILLECTOMY     *  *  Current Outpatient Medications   Medication Sig Dispense Refill    alendronate (FOSAMAX) 70 MG tablet Take 70 mg by mouth every 7 days. On Saturdays      amLODIPine (NORVASC) 10 MG tablet Take 10 mg by mouth daily.      famotidine (PEPCID) 20 MG tablet Take 20 mg by mouth 2 times daily.      hypromellose-dextran (HYPROMELLOSE-DEXTRAN 0.3-0.1%) 0.1-0.3 % ophthalmic solution Place 1-2 drops into both eyes 4 times daily as needed for dry eyes.      isosorbide mononitrate (IMDUR) 60 MG 24 hr tablet Take 60 mg by mouth daily.      Multiple Vitamins-Minerals (PRESERVISION AREDS 2) CHEW Take 1 tablet by mouth 2 times daily.      patiromer (VELTASSA) 8.4 g packet Take 8.4 g by mouth. on Mon Wed Fri at 1500      polyethylene glycol (MIRALAX) 17 GM/Dose powder Take 17 g by mouth daily. 510 g 0    rosuvastatin (CRESTOR) 10 MG tablet Take 10 mg by mouth daily.      tamsulosin (FLOMAX) 0.4 MG capsule Take 1 capsule (0.4 mg) by mouth daily. 30 capsule 0    vitamin D3 (CHOLECALCIFEROL) 50 mcg (2000 units) tablet  Take 1 tablet by mouth daily.           EXAM:    Vitals: There were no vitals taken for this visit.  BMI: There is no height or weight on file to calculate BMI.    Derm: Incisions remain coapted and dry.  Sutures intact.  Dorsal foot wound approximately the size of a dime with a granular base.  No clinical signs of infection.  Dorsal skin flap appears grossly viable.    See photographs above.

## 2024-10-09 NOTE — PATIENT INSTRUCTIONS
*PLEASE CALL [phone] TO SCHEDULE*  --------------------------------------------------------------------------  [facility]   [address] [place] [address] [address]   [place] [address] [place] [address]   [phone]  [phone]       [address]   [place] [address] [place] [address]   [phone]  [phone]  [phone]     Dr. Catarina Almendarez - [place]  Dr. Nedra Workman - [place]  Dr. Aman Vogel - [facility] / [place]    NEW ORDERS/INSTRUCTIONS FOR CARE FACILITY STAFF:    October 9, 2024      WEIGHT BEARING STATUS/ ACTIVITY:   as tolerated, with any needed assistance, in the CAM walker.   The cam walker can be removed when he is seated for some nonweightbearing ankle range of motion exercises.    DRESSING:  Dressing change recommended daily, now that wound VAC is discontinued.  Paint all incisions and blackened areas with Betadine.  Place a small amount of topical antibiotic like Neosporin or triple antibiotic on the healthy looking open wound on the top of the foot.  Cover the forefoot with gauze and secure with what ever is available.    Note: The blackened areas are not concerning.  The incisions remain well coapted and dry.  Will simply continue to monitor.  Sutures will likely be left in for up to a month.      FOLLOW UP: With Dr. Workman as scheduled on 10/22/2024      ____________________________________________  Signature      Aman Vogel DPM  [facility]

## 2024-10-09 NOTE — LETTER
10/9/2024      Gregory Zhong  [address] [facility] [address]      Dear Colleague,    Thank you for referring your patient, Gregory Zhong, to the [facility]. Please see a copy of my visit note below.    ASSESSMENT:  Encounter Diagnoses   Name Primary?     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  Dorsal wound is granular without clinical signs of infection.  Orders for his transitional care facility were provided:  Continue Betadine application to all incisions  Topical antibiotic to the dorsal wound  Gauze dressing  Continue offloading boot for ambulation  Follow-up as scheduled with Dr. Workman on 10/22/2024    Disclaimer: This note consists of symbols derived from keyboarding, dictation and/or voice recognition software. As a result, there may be errors in the script that have gone undetected. Please consider this when interpreting information found in this chart.    Aman Vogel DPM, FACFAS, [facility] Department of Podiatry/Foot & Ankle Surgery      ____________________________________________________________________    HPI:       Daniela follows up posthospitalization from 9/9/2024 until 9/20/2024.  He was admitted with multiple diagnoses including sepsis, right lower extremity cellulitis, chronic right foot ulceration, osteomyelitis of the right sesamoids, great toe proximal phalanx and third toe.  On 9/12/2024 he underwent partial right first ray resection, third digit amputation with excisional debridement by Dr. Workman.  On 9/14/2024 he was returned to the operating room for additional excisional debridement by Dr. Almendarez.  He is currently residing at a transitional care facility.  Dr. Workman discontinued the wound VAC  Wound cares include Betadine  application followed by gauze and an Ace bandage.  *  Past Medical History:   Diagnosis Date     Atrial flutter (H)      Bladder cancer (H)      Chronic atrial fibrillation (H)      Chronic kidney disease, stage III (moderate) (H)      Dyslipidemia      Gastroesophageal reflux disease      History of basal cell carcinoma      HLD (hyperlipidemia)      Hyperparathyroidism (H)      Hypertension      Mild cognitive impairment      Prostate cancer (H)      Tremor    *  *  Past Surgical History:   Procedure Laterality Date     AMPUTATE TOE(S) Right 9/12/2024    Procedure: Right foot partial first ray resection, right foot partial third digit amputation, right foot excisional debridement down to and including tendon, site measuring 7 cm x 4 cm x 2 cm;  Surgeon: Nedra Workman DPM;  Location: [facility] OR     APPENDECTOMY       Cryoablation of left and right renal masses       CYSTOSCOPY       Excision of basal cell carcinoma       IRRIGATION AND DEBRIDEMENT FOOT, COMBINED Right 9/14/2024    Procedure: Excisional debridement of necrotic tissue right foot;  Surgeon: Catarina Almendarez DPM, Podiatry/Foot and Ankle Surgery;  Location: [facility] OR     MOHS MICROGRAPHIC PROCEDURE       TONSILLECTOMY     *  *  Current Outpatient Medications   Medication Sig Dispense Refill     alendronate (FOSAMAX) 70 MG tablet Take 70 mg by mouth every 7 days. On Saturdays       amLODIPine (NORVASC) 10 MG tablet Take 10 mg by mouth daily.       famotidine (PEPCID) 20 MG tablet Take 20 mg by mouth 2 times daily.       hypromellose-dextran (HYPROMELLOSE-DEXTRAN 0.3-0.1%) 0.1-0.3 % ophthalmic solution Place 1-2 drops into both eyes 4 times daily as needed for dry eyes.       isosorbide mononitrate (IMDUR) 60 MG 24 hr tablet Take 60 mg by mouth daily.       Multiple Vitamins-Minerals (PRESERVISION AREDS 2) CHEW Take 1 tablet by mouth 2 times daily.       patiromer (VELTASSA) 8.4 g packet Take 8.4 g by mouth. on Mon Wed Fri at 1500       polyethylene glycol  (MIRALAX) 17 GM/Dose powder Take 17 g by mouth daily. 510 g 0     rosuvastatin (CRESTOR) 10 MG tablet Take 10 mg by mouth daily.       tamsulosin (FLOMAX) 0.4 MG capsule Take 1 capsule (0.4 mg) by mouth daily. 30 capsule 0     vitamin D3 (CHOLECALCIFEROL) 50 mcg (2000 units) tablet Take 1 tablet by mouth daily.           EXAM:    Vitals: There were no vitals taken for this visit.  BMI: There is no height or weight on file to calculate BMI.    Derm: Incisions remain coapted and dry.  Sutures intact.  Dorsal foot wound approximately the size of a dime with a granular base.  No clinical signs of infection.  Dorsal skin flap appears grossly viable.    See photographs above.        Again, thank you for allowing me to participate in the care of your patient.        Sincerely,        Aman Vogel, OBI

## 2024-10-12 LAB — BACTERIA TISS BX CULT: NO GROWTH

## 2024-10-22 ENCOUNTER — OFFICE VISIT (OUTPATIENT)
Dept: PODIATRY | Facility: CLINIC | Age: 84
End: 2024-10-22
Payer: COMMERCIAL

## 2024-10-22 VITALS
SYSTOLIC BLOOD PRESSURE: 140 MMHG | DIASTOLIC BLOOD PRESSURE: 70 MMHG | WEIGHT: 200 LBS | HEIGHT: 75 IN | BODY MASS INDEX: 24.87 KG/M2

## 2024-10-22 DIAGNOSIS — L97.512 RIGHT FOOT ULCER, WITH FAT LAYER EXPOSED (H): ICD-10-CM

## 2024-10-22 DIAGNOSIS — Z89.431 STATUS POST PARTIAL AMPUTATION OF RIGHT FOOT (H): ICD-10-CM

## 2024-10-22 DIAGNOSIS — Z89.421 STATUS POST AMPUTATION OF LESSER TOE OF RIGHT FOOT (H): Primary | ICD-10-CM

## 2024-10-22 PROCEDURE — 99024 POSTOP FOLLOW-UP VISIT: CPT | Performed by: PODIATRIST

## 2024-10-22 PROCEDURE — 11042 DBRDMT SUBQ TIS 1ST 20SQCM/<: CPT | Mod: 58 | Performed by: PODIATRIST

## 2024-10-22 NOTE — PROGRESS NOTES
ASSESSMENT:                Cultures:      3+ Streptococcus intermedius Abnormal          Assessment: 83 yr old male with chronic kidney disease, s/p partial right 1st ray and partial right 3rd toe amputations and repeat Excisional debridement right foot wound on 9/14     Plan:    -Discussed all findings with patient. Chart and imaging reviewed.   -Overall right foot is improving. Incision sites are healed. Sutures removed.   -Debrided dorsal wound bed as above. Provided new dressing change information. Betadine to wound bed.   -Cont CAM boot when working with PT or outside of his apartment. Okay to change to surgical shoe only when in his unit.  -Follow up in three weeks     Nedra Workman DPM  ____________________________________________________________________    HPI:       Gregory follows up posthospitalization from 9/9/2024 until 9/20/2024.  He was admitted with multiple diagnoses including sepsis, right lower extremity cellulitis, chronic right foot ulceration, osteomyelitis of the right sesamoids, great toe proximal phalanx and third toe.  On 9/12/2024 he underwent partial right first ray resection, third digit amputation with excisional debridement by Dr. Workman.  On 9/14/2024 he was returned to the operating room for additional excisional debridement by Dr. Almendarez.  States is doing well help and is happy the wound vac has been discontinued. Is ready to be out of CAM boot, but okay with it. Is working with PT, but also using a wheelchair. No known issues.   *  Past Medical History:   Diagnosis Date    Atrial flutter (H)     Bladder cancer (H)     Chronic atrial fibrillation (H)     Chronic kidney disease, stage III (moderate) (H)     Dyslipidemia     Gastroesophageal reflux disease     History of basal cell carcinoma     HLD (hyperlipidemia)     Hyperparathyroidism (H)     Hypertension     Mild cognitive impairment     Prostate cancer (H)     Tremor    *  *  Past Surgical History:   Procedure Laterality Date  "   AMPUTATE TOE(S) Right 9/12/2024    Procedure: Right foot partial first ray resection, right foot partial third digit amputation, right foot excisional debridement down to and including tendon, site measuring 7 cm x 4 cm x 2 cm;  Surgeon: Nedra Workman DPM;  Location: [facility] OR    APPENDECTOMY      Cryoablation of left and right renal masses      CYSTOSCOPY      Excision of basal cell carcinoma      IRRIGATION AND DEBRIDEMENT FOOT, COMBINED Right 9/14/2024    Procedure: Excisional debridement of necrotic tissue right foot;  Surgeon: Catarina Almendarez DPM, Podiatry/Foot and Ankle Surgery;  Location: [facility] OR    MOHS MICROGRAPHIC PROCEDURE      TONSILLECTOMY     *  *  Current Outpatient Medications   Medication Sig Dispense Refill    alendronate (FOSAMAX) 70 MG tablet Take 70 mg by mouth every 7 days. On Saturdays      amLODIPine (NORVASC) 10 MG tablet Take 10 mg by mouth daily.      famotidine (PEPCID) 20 MG tablet Take 20 mg by mouth 2 times daily.      hypromellose-dextran (HYPROMELLOSE-DEXTRAN 0.3-0.1%) 0.1-0.3 % ophthalmic solution Place 1-2 drops into both eyes 4 times daily as needed for dry eyes.      isosorbide mononitrate (IMDUR) 60 MG 24 hr tablet Take 60 mg by mouth daily.      Multiple Vitamins-Minerals (PRESERVISION AREDS 2) CHEW Take 1 tablet by mouth 2 times daily.      patiromer (VELTASSA) 8.4 g packet Take 8.4 g by mouth. on Mon Wed Fri at 1500      rosuvastatin (CRESTOR) 10 MG tablet Take 10 mg by mouth daily.      vitamin D3 (CHOLECALCIFEROL) 50 mcg (2000 units) tablet Take 1 tablet by mouth daily.           EXAM:    Vitals: Ht 1.905 m (6' 3\")   BMI 27.10 kg/m    BMI: Body mass index is 27.1 kg/m .    A1C: 5.2 (9/9/2024)     General:  Patient is alert and orientated.  NAD.     Vascular:  DP and PT pulses are palpable.  No varicosities noted  CFT's < 3secs.  Skin temp is normal.      Neuro:  Light and gross touch sensation diminished to feet.     Derm: Incision sites intact and mostly epithelalized " at this point. Distal third digit with small open site. Submet one site also healed. Dorsal ulcer still present, but filling in and primarily granlular. Small amount of fibrous tissue excised.      Musculoskeletal:  Previously amputated right great toe and partial right 3rd toe.       PROCEDURE:   Verbal consent was obtained for debridement. A 15 blade was used to excise the nonivable tissue to the ulcer, down to and including subcutaneous tissue. No noted purulence afterwards. Debrided site  measures 2 cm x 2 cm x .4 cm. No probe to bone. Well tolerated. Site was cleansed with alcohol.

## 2024-10-22 NOTE — LETTER
10/22/2024      Gregory Zhong  [address] [facility] [address]      Dear Colleague,    Thank you for referring your patient, Gregory Zhong, to the [facility]. Please see a copy of my visit note below.    ASSESSMENT:                Cultures:      3+ Streptococcus intermedius Abnormal          Assessment: 83 yr old male with chronic kidney disease, s/p partial right 1st ray and partial right 3rd toe amputations and repeat Excisional debridement right foot wound on 9/14     Plan:    -Discussed all findings with patient. Chart and imaging reviewed.   -Overall right foot is improving. Incision sites are healed. Sutures removed.   -Debrided dorsal wound bed as above. Provided new dressing change information. Betadine to wound bed.   -Cont CAM boot when working with PT or outside of his apartment. Okay to change to surgical shoe only when in his unit.  -Follow up in three weeks     Nedra Workman DPM  ____________________________________________________________________    HPI:       Gregory follows up posthospitalization from 9/9/2024 until 9/20/2024.  He was admitted with multiple diagnoses including sepsis, right lower extremity cellulitis, chronic right foot ulceration, osteomyelitis of the right sesamoids, great toe proximal phalanx and third toe.  On 9/12/2024 he underwent partial right first ray resection, third digit amputation with excisional debridement by Dr. Workman.  On 9/14/2024 he was returned to the operating room for additional excisional debridement by Dr. Almendarez.  States is doing well help and is happy the wound vac has been discontinued. Is ready to be out of CAM boot, but okay with it. Is working with PT, but also using a wheelchair. No known issues.   *  Past Medical History:   Diagnosis Date     Atrial flutter (H)      Bladder cancer (H)      Chronic atrial fibrillation (H)      Chronic kidney disease, stage III (moderate) (H)      Dyslipidemia       "Gastroesophageal reflux disease      History of basal cell carcinoma      HLD (hyperlipidemia)      Hyperparathyroidism (H)      Hypertension      Mild cognitive impairment      Prostate cancer (H)      Tremor    *  *  Past Surgical History:   Procedure Laterality Date     AMPUTATE TOE(S) Right 9/12/2024    Procedure: Right foot partial first ray resection, right foot partial third digit amputation, right foot excisional debridement down to and including tendon, site measuring 7 cm x 4 cm x 2 cm;  Surgeon: Nedra Workman DPM;  Location: [facility] OR     APPENDECTOMY       Cryoablation of left and right renal masses       CYSTOSCOPY       Excision of basal cell carcinoma       IRRIGATION AND DEBRIDEMENT FOOT, COMBINED Right 9/14/2024    Procedure: Excisional debridement of necrotic tissue right foot;  Surgeon: Catarina Almendarez DPM, Podiatry/Foot and Ankle Surgery;  Location: [facility] OR     MOHS MICROGRAPHIC PROCEDURE       TONSILLECTOMY     *  *  Current Outpatient Medications   Medication Sig Dispense Refill     alendronate (FOSAMAX) 70 MG tablet Take 70 mg by mouth every 7 days. On Saturdays       amLODIPine (NORVASC) 10 MG tablet Take 10 mg by mouth daily.       famotidine (PEPCID) 20 MG tablet Take 20 mg by mouth 2 times daily.       hypromellose-dextran (HYPROMELLOSE-DEXTRAN 0.3-0.1%) 0.1-0.3 % ophthalmic solution Place 1-2 drops into both eyes 4 times daily as needed for dry eyes.       isosorbide mononitrate (IMDUR) 60 MG 24 hr tablet Take 60 mg by mouth daily.       Multiple Vitamins-Minerals (PRESERVISION AREDS 2) CHEW Take 1 tablet by mouth 2 times daily.       patiromer (VELTASSA) 8.4 g packet Take 8.4 g by mouth. on Mon Wed Fri at 1500       rosuvastatin (CRESTOR) 10 MG tablet Take 10 mg by mouth daily.       vitamin D3 (CHOLECALCIFEROL) 50 mcg (2000 units) tablet Take 1 tablet by mouth daily.           EXAM:    Vitals: Ht 1.905 m (6' 3\")   BMI 27.10 kg/m    BMI: Body mass index is 27.1 kg/m .    A1C: 5.2 " (9/9/2024)     General:  Patient is alert and orientated.  NAD.     Vascular:  DP and PT pulses are palpable.  No varicosities noted  CFT's < 3secs.  Skin temp is normal.      Neuro:  Light and gross touch sensation diminished to feet.     Derm: Incision sites intact and mostly epithelalized at this point. Distal third digit with small open site. Submet one site also healed. Dorsal ulcer still present, but filling in and primarily granlular. Small amount of fibrous tissue excised.      Musculoskeletal:  Previously amputated right great toe and partial right 3rd toe.       PROCEDURE:   Verbal consent was obtained for debridement. A 15 blade was used to excise the nonivable tissue to the ulcer, down to and including subcutaneous tissue. No noted purulence afterwards. Debrided site  measures 2 cm x 2 cm x .4 cm. No probe to bone. Well tolerated. Site was cleansed with alcohol.         Again, thank you for allowing me to participate in the care of your patient.        Sincerely,        Nedra Workman DPM

## 2024-11-12 ENCOUNTER — OFFICE VISIT (OUTPATIENT)
Dept: PODIATRY | Facility: CLINIC | Age: 84
End: 2024-11-12
Payer: COMMERCIAL

## 2024-11-12 DIAGNOSIS — Z89.431 STATUS POST PARTIAL AMPUTATION OF RIGHT FOOT (H): ICD-10-CM

## 2024-11-12 DIAGNOSIS — Z89.421 STATUS POST AMPUTATION OF LESSER TOE OF RIGHT FOOT (H): Primary | ICD-10-CM

## 2024-11-12 DIAGNOSIS — M20.41 HAMMER TOE OF RIGHT FOOT: ICD-10-CM

## 2024-11-12 DIAGNOSIS — L97.512 RIGHT FOOT ULCER, WITH FAT LAYER EXPOSED (H): ICD-10-CM

## 2024-11-12 PROCEDURE — 99024 POSTOP FOLLOW-UP VISIT: CPT | Performed by: PODIATRIST

## 2024-11-12 NOTE — LETTER
11/12/2024      Gregory Zhong  [address] [facility] [address]      Dear Colleague,    Thank you for referring your patient, Gregory Zhong, to the [facility]. Please see a copy of my visit note below.    ASSESSMENT:                    Cultures:      3+ Streptococcus intermedius Abnormal          Assessment: 83 yr old male with chronic kidney disease, s/p partial right 1st ray and partial right 3rd toe amputations and repeat Excisional debridement right foot wound on 9/14  Second digit hammertoe with preulcerative lesions     Plan:    -Discussed all findings with patient. Chart and imaging reviewed.   -Original right foot infection and ulcer is doing well. Incision site well closed. Dorsal site smaller, drier and closing  -Second digit though with new areas of breakdown dorsally and distally. Discussed with patient and his son. Likely need for flexor tenotomy here to offload and place digit in new rectus position. Discussed offloading in soft slipper today and dispensed toe crest pad to assist.   -Takes coumadin for atrial fibrillation. Gave instructions on holding this. Plan to stop it day prior to next appt.   -Will see patient back in 2 weeks. Plan for flexor tenotomy at that time if needed. Patient and son agree to this plan.    Nedra Workman DPM  ____________________________________________________________________    HPI:       Gregory follows up posthospitalization from 9/9/2024 until 9/20/2024.  He was admitted with multiple diagnoses including sepsis, right lower extremity cellulitis, chronic right foot ulceration, osteomyelitis of the right sesamoids, great toe proximal phalanx and third toe.  On 9/12/2024 he underwent partial right first ray resection, third digit amputation with excisional debridement by Dr. Workman.  On 9/14/2024 he was returned to the operating room for additional excisional debridement by Dr. Almendarez.  States is doing well help and is  happy the wound vac has been discontinued. Is ready to be out of CAM boot, but okay with it. Is working with PT, but also using a wheelchair. No known issues.   11/12: follows up for right foot. IS walking more with PT. Still using CAM boot. Denies pain to his right foot. States drainage is less. Notes new areas that appear open to second digit.  *  Past Medical History:   Diagnosis Date     Atrial flutter (H)      Bladder cancer (H)      Chronic atrial fibrillation (H)      Chronic kidney disease, stage III (moderate) (H)      Dyslipidemia      Gastroesophageal reflux disease      History of basal cell carcinoma      HLD (hyperlipidemia)      Hyperparathyroidism (H)      Hypertension      Mild cognitive impairment      Prostate cancer (H)      Tremor    *  *  Past Surgical History:   Procedure Laterality Date     AMPUTATE TOE(S) Right 9/12/2024    Procedure: Right foot partial first ray resection, right foot partial third digit amputation, right foot excisional debridement down to and including tendon, site measuring 7 cm x 4 cm x 2 cm;  Surgeon: Nedra Workman DPM;  Location: [facility] OR     APPENDECTOMY       Cryoablation of left and right renal masses       CYSTOSCOPY       Excision of basal cell carcinoma       IRRIGATION AND DEBRIDEMENT FOOT, COMBINED Right 9/14/2024    Procedure: Excisional debridement of necrotic tissue right foot;  Surgeon: Catarina Almendarez DPM, Podiatry/Foot and Ankle Surgery;  Location: [facility] OR     MOHS MICROGRAPHIC PROCEDURE       TONSILLECTOMY     *  *  Current Outpatient Medications   Medication Sig Dispense Refill     alendronate (FOSAMAX) 70 MG tablet Take 70 mg by mouth every 7 days. On Saturdays       amLODIPine (NORVASC) 10 MG tablet Take 10 mg by mouth daily.       famotidine (PEPCID) 20 MG tablet Take 20 mg by mouth 2 times daily.       hypromellose-dextran (HYPROMELLOSE-DEXTRAN 0.3-0.1%) 0.1-0.3 % ophthalmic solution Place 1-2 drops into both eyes 4 times daily as needed for dry  eyes.       isosorbide mononitrate (IMDUR) 60 MG 24 hr tablet Take 60 mg by mouth daily.       Multiple Vitamins-Minerals (PRESERVISION AREDS 2) CHEW Take 1 tablet by mouth 2 times daily.       patiromer (VELTASSA) 8.4 g packet Take 8.4 g by mouth. on Mon Wed Fri at 1500       rosuvastatin (CRESTOR) 10 MG tablet Take 10 mg by mouth daily.       vitamin D3 (CHOLECALCIFEROL) 50 mcg (2000 units) tablet Take 1 tablet by mouth daily.           EXAM:    Vitals: There were no vitals taken for this visit.  BMI: There is no height or weight on file to calculate BMI.    A1C: 5.2 (9/9/2024)     General:  Patient is alert and orientated.  NAD.     Vascular:  DP and PT pulses are palpable.  No varicosities noted  CFT's < 3secs.  Skin temp is normal.      Neuro:  Light and gross touch sensation diminished to feet.     Derm: Incision sites intact and mostly epithelalized at this point. Distal third digit with small open site. Submet one site also healed. Dorsal ulcer still present, but filling in and primarily granlular. Small amount of fibrous tissue excised.   Second digit: several preulcerative lesions to distal toe and dorsal toe. Toe is mildly erythematous and elongated. Hammered in nature.     Musculoskeletal:  Previously amputated right great toe and partial right 3rd toe.             Again, thank you for allowing me to participate in the care of your patient.        Sincerely,        Nedra Workman DPM

## 2024-11-12 NOTE — PATIENT INSTRUCTIONS
Thank you for choosing [facility] Podiatry / Foot & Ankle Surgery!    DR. PALOMINO'S CLINIC:  [facility] SCHEDULE SURGERY: [phone] [address] BILLING QUESTIONS: [phone]   [place] [address] APPOINTMENTS: [phone]   PH: [phone] [facility] HERNANDEZ LINE:[phone]   FAX: [phone] RADIOLOGY: [phone]    [facility]: [phone]    PHYSICAL THERAPY: [phone]       Follow up:   -2 weeks    Next steps:   -Follow up in 2 weeks to have second digit reevaluated. Plan for tendon cutting procedure. Hold coumadin the evening before the procedure.   -For the next two weeks, continue to keep foot clean, dry and covered. Cleanse sites with wound cleanser. Pad dry. Paint sites with betadine. Cover with gauze, gauze wrap and an ACE bandage.   -Wear toe crest pad under second toe.   -Begin walking in regular shoe, ideally a soft slipper.   -Okay to be full weightbearing with physical therapy.         Flu vaccines are now available at all [facility] clinics and retail pharmacies across the [facility] and the [facility]. Appointments are required for clinic locations. To schedule an appointment online, please log into [employer] or create an account if you are a new user. You can also call [phone], or simply walk in at one of the [facility] retail pharmacy locations.

## 2024-11-12 NOTE — PROGRESS NOTES
ASSESSMENT:                    Cultures:      3+ Streptococcus intermedius Abnormal          Assessment: 83 yr old male with chronic kidney disease, s/p partial right 1st ray and partial right 3rd toe amputations and repeat Excisional debridement right foot wound on 9/14  Second digit hammertoe with preulcerative lesions     Plan:    -Discussed all findings with patient. Chart and imaging reviewed.   -Original right foot infection and ulcer is doing well. Incision site well closed. Dorsal site smaller, drier and closing  -Second digit though with new areas of breakdown dorsally and distally. Discussed with patient and his son. Likely need for flexor tenotomy here to offload and place digit in new rectus position. Discussed offloading in soft slipper today and dispensed toe crest pad to assist.   -Takes coumadin for atrial fibrillation. Gave instructions on holding this. Plan to stop it day prior to next appt.   -Will see patient back in 2 weeks. Plan for flexor tenotomy at that time if needed. Patient and son agree to this plan.    Nedra Workman DPM  ____________________________________________________________________    HPI:       Gregory follows up posthospitalization from 9/9/2024 until 9/20/2024.  He was admitted with multiple diagnoses including sepsis, right lower extremity cellulitis, chronic right foot ulceration, osteomyelitis of the right sesamoids, great toe proximal phalanx and third toe.  On 9/12/2024 he underwent partial right first ray resection, third digit amputation with excisional debridement by Dr. Workman.  On 9/14/2024 he was returned to the operating room for additional excisional debridement by Dr. Almendarez.  States is doing well help and is happy the wound vac has been discontinued. Is ready to be out of CAM boot, but okay with it. Is working with PT, but also using a wheelchair. No known issues.   11/12: follows up for right foot. IS walking more with PT. Still using CAM boot. Denies pain to  his right foot. States drainage is less. Notes new areas that appear open to second digit.  *  Past Medical History:   Diagnosis Date    Atrial flutter (H)     Bladder cancer (H)     Chronic atrial fibrillation (H)     Chronic kidney disease, stage III (moderate) (H)     Dyslipidemia     Gastroesophageal reflux disease     History of basal cell carcinoma     HLD (hyperlipidemia)     Hyperparathyroidism (H)     Hypertension     Mild cognitive impairment     Prostate cancer (H)     Tremor    *  *  Past Surgical History:   Procedure Laterality Date    AMPUTATE TOE(S) Right 9/12/2024    Procedure: Right foot partial first ray resection, right foot partial third digit amputation, right foot excisional debridement down to and including tendon, site measuring 7 cm x 4 cm x 2 cm;  Surgeon: Nedra Workman DPM;  Location: [facility] OR    APPENDECTOMY      Cryoablation of left and right renal masses      CYSTOSCOPY      Excision of basal cell carcinoma      IRRIGATION AND DEBRIDEMENT FOOT, COMBINED Right 9/14/2024    Procedure: Excisional debridement of necrotic tissue right foot;  Surgeon: Catarina Almendarez DPM, Podiatry/Foot and Ankle Surgery;  Location: [facility] OR    MOHS MICROGRAPHIC PROCEDURE      TONSILLECTOMY     *  *  Current Outpatient Medications   Medication Sig Dispense Refill    alendronate (FOSAMAX) 70 MG tablet Take 70 mg by mouth every 7 days. On Saturdays      amLODIPine (NORVASC) 10 MG tablet Take 10 mg by mouth daily.      famotidine (PEPCID) 20 MG tablet Take 20 mg by mouth 2 times daily.      hypromellose-dextran (HYPROMELLOSE-DEXTRAN 0.3-0.1%) 0.1-0.3 % ophthalmic solution Place 1-2 drops into both eyes 4 times daily as needed for dry eyes.      isosorbide mononitrate (IMDUR) 60 MG 24 hr tablet Take 60 mg by mouth daily.      Multiple Vitamins-Minerals (PRESERVISION AREDS 2) CHEW Take 1 tablet by mouth 2 times daily.      patiromer (VELTASSA) 8.4 g packet Take 8.4 g by mouth. on Mon Wed Fri at 1500       rosuvastatin (CRESTOR) 10 MG tablet Take 10 mg by mouth daily.      vitamin D3 (CHOLECALCIFEROL) 50 mcg (2000 units) tablet Take 1 tablet by mouth daily.           EXAM:    Vitals: There were no vitals taken for this visit.  BMI: There is no height or weight on file to calculate BMI.    A1C: 5.2 (9/9/2024)     General:  Patient is alert and orientated.  NAD.     Vascular:  DP and PT pulses are palpable.  No varicosities noted  CFT's < 3secs.  Skin temp is normal.      Neuro:  Light and gross touch sensation diminished to feet.     Derm: Incision sites intact and mostly epithelalized at this point. Distal third digit with small open site. Submet one site also healed. Dorsal ulcer still present, but filling in and primarily granlular. Small amount of fibrous tissue excised.   Second digit: several preulcerative lesions to distal toe and dorsal toe. Toe is mildly erythematous and elongated. Hammered in nature.     Musculoskeletal:  Previously amputated right great toe and partial right 3rd toe.

## 2024-11-14 ENCOUNTER — APPOINTMENT (OUTPATIENT)
Dept: GENERAL RADIOLOGY | Facility: CLINIC | Age: 84
End: 2024-11-14
Attending: EMERGENCY MEDICINE
Payer: COMMERCIAL

## 2024-11-14 ENCOUNTER — HOSPITAL ENCOUNTER (EMERGENCY)
Facility: CLINIC | Age: 84
Discharge: HOME | End: 2024-11-14
Attending: EMERGENCY MEDICINE | Admitting: EMERGENCY MEDICINE
Payer: COMMERCIAL

## 2024-11-14 VITALS
HEIGHT: 74 IN | BODY MASS INDEX: 25.67 KG/M2 | OXYGEN SATURATION: 100 % | HEART RATE: 64 BPM | DIASTOLIC BLOOD PRESSURE: 103 MMHG | RESPIRATION RATE: 18 BRPM | SYSTOLIC BLOOD PRESSURE: 155 MMHG | WEIGHT: 200 LBS

## 2024-11-14 DIAGNOSIS — S91.114A LACERATION OF LESSER TOE OF RIGHT FOOT WITHOUT FOREIGN BODY PRESENT OR DAMAGE TO NAIL, INITIAL ENCOUNTER: ICD-10-CM

## 2024-11-14 DIAGNOSIS — T14.8XXA CHRONIC WOUND: ICD-10-CM

## 2024-11-14 LAB
ANION GAP SERPL CALCULATED.3IONS-SCNC: 11 MMOL/L (ref 7–15)
BASOPHILS # BLD AUTO: 0.1 10E3/UL (ref 0–0.2)
BASOPHILS NFR BLD AUTO: 0 %
BUN SERPL-MCNC: 36.4 MG/DL (ref 8–23)
CALCIUM SERPL-MCNC: 10.1 MG/DL (ref 8.8–10.4)
CHLORIDE SERPL-SCNC: 110 MMOL/L (ref 98–107)
CREAT SERPL-MCNC: 1.74 MG/DL (ref 0.67–1.17)
CRP SERPL-MCNC: <3 MG/L
EGFRCR SERPLBLD CKD-EPI 2021: 38 ML/MIN/1.73M2
EOSINOPHIL # BLD AUTO: 0.1 10E3/UL (ref 0–0.7)
EOSINOPHIL NFR BLD AUTO: 1 %
ERYTHROCYTE [DISTWIDTH] IN BLOOD BY AUTOMATED COUNT: 14.5 % (ref 10–15)
ERYTHROCYTE [SEDIMENTATION RATE] IN BLOOD BY WESTERGREN METHOD: 9 MM/HR (ref 0–20)
GLUCOSE SERPL-MCNC: 102 MG/DL (ref 70–99)
HCO3 SERPL-SCNC: 22 MMOL/L (ref 22–29)
HCT VFR BLD AUTO: 38.7 % (ref 40–53)
HGB BLD-MCNC: 12.6 G/DL (ref 13.3–17.7)
IMM GRANULOCYTES # BLD: 0.1 10E3/UL
IMM GRANULOCYTES NFR BLD: 1 %
INR PPP: 2.27 (ref 0.85–1.15)
LYMPHOCYTES # BLD AUTO: 1.4 10E3/UL (ref 0.8–5.3)
LYMPHOCYTES NFR BLD AUTO: 11 %
MCH RBC QN AUTO: 29.5 PG (ref 26.5–33)
MCHC RBC AUTO-ENTMCNC: 32.6 G/DL (ref 31.5–36.5)
MCV RBC AUTO: 91 FL (ref 78–100)
MONOCYTES # BLD AUTO: 0.7 10E3/UL (ref 0–1.3)
MONOCYTES NFR BLD AUTO: 5 %
NEUTROPHILS # BLD AUTO: 10.7 10E3/UL (ref 1.6–8.3)
NEUTROPHILS NFR BLD AUTO: 83 %
NRBC # BLD AUTO: 0 10E3/UL
NRBC BLD AUTO-RTO: 0 /100
PLATELET # BLD AUTO: 214 10E3/UL (ref 150–450)
POTASSIUM SERPL-SCNC: 5.1 MMOL/L (ref 3.4–5.3)
RBC # BLD AUTO: 4.27 10E6/UL (ref 4.4–5.9)
SODIUM SERPL-SCNC: 143 MMOL/L (ref 135–145)
WBC # BLD AUTO: 13 10E3/UL (ref 4–11)

## 2024-11-14 PROCEDURE — 87070 CULTURE OTHR SPECIMN AEROBIC: CPT | Performed by: EMERGENCY MEDICINE

## 2024-11-14 PROCEDURE — 85652 RBC SED RATE AUTOMATED: CPT | Performed by: EMERGENCY MEDICINE

## 2024-11-14 PROCEDURE — 99284 EMERGENCY DEPT VISIT MOD MDM: CPT

## 2024-11-14 PROCEDURE — 85014 HEMATOCRIT: CPT | Performed by: EMERGENCY MEDICINE

## 2024-11-14 PROCEDURE — 85025 COMPLETE CBC W/AUTO DIFF WBC: CPT | Performed by: EMERGENCY MEDICINE

## 2024-11-14 PROCEDURE — 36415 COLL VENOUS BLD VENIPUNCTURE: CPT | Performed by: EMERGENCY MEDICINE

## 2024-11-14 PROCEDURE — 12001 RPR S/N/AX/GEN/TRNK 2.5CM/<: CPT | Mod: T1

## 2024-11-14 PROCEDURE — 85610 PROTHROMBIN TIME: CPT | Performed by: EMERGENCY MEDICINE

## 2024-11-14 PROCEDURE — 73630 X-RAY EXAM OF FOOT: CPT | Mod: RT

## 2024-11-14 PROCEDURE — 80048 BASIC METABOLIC PNL TOTAL CA: CPT | Performed by: EMERGENCY MEDICINE

## 2024-11-14 PROCEDURE — 86140 C-REACTIVE PROTEIN: CPT | Performed by: EMERGENCY MEDICINE

## 2024-11-14 RX ORDER — LIDOCAINE HYDROCHLORIDE AND EPINEPHRINE 10; 10 MG/ML; UG/ML
5 INJECTION, SOLUTION INFILTRATION; PERINEURAL ONCE
Status: DISCONTINUED | OUTPATIENT
Start: 2024-11-14 | End: 2024-11-14 | Stop reason: HOSPADM

## 2024-11-14 RX ORDER — CEPHALEXIN 500 MG/1
500 CAPSULE ORAL 3 TIMES DAILY
Qty: 21 CAPSULE | Refills: 0 | Status: SHIPPED | OUTPATIENT
Start: 2024-11-14 | End: 2024-11-21

## 2024-11-14 ASSESSMENT — ACTIVITIES OF DAILY LIVING (ADL)
ADLS_ACUITY_SCORE: 0

## 2024-11-14 NOTE — ED PROVIDER NOTES
Emergency Department Note      History of Present Illness     Chief Complaint   Fall    HPI   Gregory Zhong is a 84 year old male, anticoagulated with warfarin, with history of atrial fibrillation, prostate cancer, and hypertension who presents to the ED via EMS for evaluation of a mechanical fall. Gregory was in the bathroom when he slipped on the wet floor, he landed on his right knee. He did not hit his head. Gregory then crawled to his bedroom where he tried to get up, he was unable to get up on his own and called the staff at his facility who then called EMS. During his crawl from the bathroom to his bedroom he scraped up his toes on his right foot. His right big toe was amputated on 09/12, at the site there is discharge draining, and below one of his toes he has a fresh laceration. Gregory denies chest pain, neck pain, vomiting, diarrhea, fever, or recent illness.    Independent Historian   None    Review of External Notes   I reviewed the telephone encounter from earlier today regarding the patient's wounds and fall.  I reviewed the podiatry office visit from 11/12/2024 he is following up after his amputation and for his chronic wounds.  The picture from that visit appears similar to his foot appearance today except for a new laceration to the second toe.    Past Medical History     Medical History and Problem List   Atrial flutter  Adenomatous polyp of colon   Acute hematogenous osteomyelitis of right foot  Bladder cancer   Cortical age-related cataract of both eyes  Chronic atrial fibrillation   Cervicalgia   Choledochocele   Cellulitis of right foot   Chronic kidney disease, stage IIIb  Dyslipidemia  Gastroesophageal reflux disease  Hereditary and idiopathic peripheral neuropathy  History of basal cell carcinoma  Hyperlipidemia   Hypertrophy of prostate   Hearing loss  Hypercalcemia   Hyperparathyroidism   Hypertension  Impotence of organic origin  Malignant neoplasm of urinary bladder   Multiple gallstones  "  Mild cognitive impairment  Prostate cancer   Pre-syncope   Peripapillary neovascularization of left eye  Tremor  Sinus bradycardia   Severe sepsis   Osteopenia   Ulcer of right foot     Medications   Fosamax   Norvasc  Pepcid   Hypromellose-dextran   Imdur   Veltassa   Crestor   ProAmatine   Miralax   Warfarin     Surgical History   Past Surgical History:   Procedure Laterality Date    AMPUTATE TOE(S) Right 9/12/2024    Procedure: Right foot partial first ray resection, right foot partial third digit amputation, right foot excisional debridement down to and including tendon, site measuring 7 cm x 4 cm x 2 cm;  Surgeon: Nedra Workman DPM;  Location: [facility] OR    APPENDECTOMY      Cryoablation of left and right renal masses      CYSTOSCOPY      Excision of basal cell carcinoma      IRRIGATION AND DEBRIDEMENT FOOT, COMBINED Right 9/14/2024    Procedure: Excisional debridement of necrotic tissue right foot;  Surgeon: Catarina Almendarez DPM, Podiatry/Foot and Ankle Surgery;  Location: [facility] OR    MOHS MICROGRAPHIC PROCEDURE      TONSILLECTOMY       Physical Exam     Patient Vitals for the past 24 hrs:   BP Pulse Resp SpO2 Height Weight   11/14/24 2109 -- -- -- -- 1.88 m (6' 2\") 90.7 kg (200 lb)   11/14/24 1703 (!) 155/103 64 18 100 % -- --     Physical Exam  Vitals and nursing note reviewed.   Constitutional:       General: He is not in acute distress.     Appearance: He is not ill-appearing.   HENT:      Head: Normocephalic and atraumatic.      Right Ear: External ear normal.      Left Ear: External ear normal.      Nose: Nose normal.   Eyes:      Conjunctiva/sclera: Conjunctivae normal.   Cardiovascular:      Rate and Rhythm: Normal rate and regular rhythm.      Heart sounds: No murmur heard.  Pulmonary:      Effort: Pulmonary effort is normal. No respiratory distress.      Breath sounds: No wheezing, rhonchi or rales.   Abdominal:      General: Abdomen is flat. There is no distension.      Palpations: Abdomen is soft. "      Tenderness: There is no abdominal tenderness. There is no guarding or rebound.   Musculoskeletal:         General: No swelling or deformity.      Cervical back: Normal range of motion and neck supple.      Right knee: Ecchymosis present. No deformity or bony tenderness. Normal range of motion. Normal alignment.      Right foot: Swelling and laceration present.        Feet:    Skin:     General: Skin is warm and dry.      Findings: No rash.   Neurological:      Mental Status: He is alert and oriented to person, place, and time.   Psychiatric:         Behavior: Behavior normal.           Diagnostics     Lab Results   Labs Ordered and Resulted from Time of ED Arrival to Time of ED Departure   BASIC METABOLIC PANEL - Abnormal       Result Value    Sodium 143      Potassium 5.1      Chloride 110 (*)     Carbon Dioxide (CO2) 22      Anion Gap 11      Urea Nitrogen 36.4 (*)     Creatinine 1.74 (*)     GFR Estimate 38 (*)     Calcium 10.1      Glucose 102 (*)    INR - Abnormal    INR 2.27 (*)    CBC WITH PLATELETS AND DIFFERENTIAL - Abnormal    WBC Count 13.0 (*)     RBC Count 4.27 (*)     Hemoglobin 12.6 (*)     Hematocrit 38.7 (*)     MCV 91      MCH 29.5      MCHC 32.6      RDW 14.5      Platelet Count 214      % Neutrophils 83      % Lymphocytes 11      % Monocytes 5      % Eosinophils 1      % Basophils 0      % Immature Granulocytes 1      NRBCs per 100 WBC 0      Absolute Neutrophils 10.7 (*)     Absolute Lymphocytes 1.4      Absolute Monocytes 0.7      Absolute Eosinophils 0.1      Absolute Basophils 0.1      Absolute Immature Granulocytes 0.1      Absolute NRBCs 0.0     CRP INFLAMMATION - Normal    CRP Inflammation <3.00     ERYTHROCYTE SEDIMENTATION RATE AUTO - Normal    Erythrocyte Sedimentation Rate 9     AEROBIC BACTERIAL CULTURE ROUTINE     Imaging   Foot  XR, G/E 3 views, right   Final Result   IMPRESSION: There has been a transmetatarsal amputation of the 1st ray since the prior study. The resection  margin was previously straight and smooth, but is now irregular and there is some new adjacent periosteal reaction, which is concerning for    osteomyelitis. No other change.        Independent Interpretation   X-ray right foot shows s/p amputation of 1st toe without any fx's    ED Course      Medications Administered   Medications   lidocaine 1% with EPINEPHrine 1:100,000 injection 5 mL (has no administration in time range)     Procedures   Procedures     Laceration Repair      Procedure: Laceration Repair performed by Juan ACOSTA CNP under my supervision.     Indication: Laceration    Consent: Verbal    Tetanus status reviewed    Location: Right Second toe    Length: 2.5 cm    Preparation: Irrigation with Sterile Saline.    Anesthesia/Sedation: Lidocaine with Epinephrine - 1%      Treatment/Exploration: Wound explored, no foreign bodies found     Closure: The wound was closed with one layer. Skin/superficial layer was closed with 4 x 4-0 Polypropylene (prolene)  using Interrupted sutures.     Patient Status: The patient tolerated the procedure well: Yes. There were no complications.    Discussion of Management   None    ED Course   ED Course as of 11/14/24 2114   Thu Nov 14, 2024 1724 I obtained history and examined the patient as noted above.    2048 I updated and discharged the patient.      Additional Documentation  None    Medical Decision Making / Diagnosis     CMS Diagnoses: None    MIPS       None    MDM   Gregory Zhong is a 84 year old male who presents with a fall and right foot injury.  Patient slipped on a wet floor and landed on his right knee.  He denies hitting his head or sustaining any other injuries.  His right knee has a contusion but he has minimal tenderness and no deformity with no decreased range of motion and he has been able to bear weight on the leg so I doubt that he has a fracture.  He did injure his right foot where he had a recent big toe amputation.  There appears to be a  new laceration at the base of the second toe.  He also has chronic wounds with some drainage and erythema.  However he was seen by his podiatrist yesterday and it sounds like this is fairly stable and chronic.  X-ray was performed and did show some bony erosions but his ESR and CRP are normal so I have low suspicion for osteomyelitis at this point.  Does have a mild leukocytosis.  The wound was repaired as noted above by the NP Juan.  We will cover the patient with Keflex for a week given questionable signs of cellulitis and for wound prophylaxis.  Patient has a follow-up appointment with his podiatrist in about 10 days so I encouraged him to follow-up as planned for wound recheck and suture removal.  We discussed return precautions.    Disposition   The patient was discharged.     Diagnosis     ICD-10-CM    1. Laceration of lesser toe of right foot without foreign body present or damage to nail, initial encounter  S91.114A       2. Chronic wound  T14.8XXA          Discharge Medications   Discharge Medication List as of 11/14/2024  8:50 PM        START taking these medications    Details   cephALEXin (KEFLEX) 500 MG capsule Take 1 capsule (500 mg) by mouth 3 times daily for 7 days., Disp-21 capsule, R-0, Local Print           I, Suzan Cunningham, am serving as a scribe at 5:21 PM on 11/14/2024 to document services personally performed by Shade Youssef MD based on my observations and the provider's statements to me.        Shade Youssef MD  11/14/24 2124       Shade Youssef MD  11/14/24 2125

## 2024-11-17 ENCOUNTER — TELEPHONE (OUTPATIENT)
Dept: NURSING | Facility: CLINIC | Age: 84
End: 2024-11-17
Payer: COMMERCIAL

## 2024-11-17 LAB
BACTERIA WND CULT: ABNORMAL
GRAM STAIN RESULT: ABNORMAL

## 2024-11-17 NOTE — LETTER
November 17, 2024        Gregory Zhong  [address] [address]          Dear Gregory Zhong:    You were seen in the [facility] Emergency Department at [facility] on 11/14/2024.  We are unable to reach you by phone, so we are sending you this letter.     It is important that you call [facility] Emergency Department lab result nurse at [phone], as we have information to relay to you. Best time to call back is between 9AM and 5:30PM, 7 days a week.      Sincerely,     [facility] Emergency Department Lab Result RN  [phone]

## 2024-11-17 NOTE — TELEPHONE ENCOUNTER
[facility]    Reason for call: Lab Result Notification     Lab Result (including Rx patient on, if applicable).  If culture, copy of lab report at bottom.  Lab Result: Wound culture right foot  cephALEXin (KEFLEX) 500 MG capsule (500 mg) by mouth 3 times daily for 7 days.   Creatinine Level (mg/dl)   Creatinine   Date Value Ref Range Status   11/14/2024 1.74 (H) 0.67 - 1.17 mg/dL Final    Creatinine clearance (ml/min), if applicable    Serum creatinine: 1.74 mg/dL (H) 11/14/24 1836  Estimated creatinine clearance: 41.3 mL/min (A)     Patient's current Symptoms:   Unable to assess, left message  ED symptoms=fall with foot laceration, Hx of chronic foot wounds  RN Recommendations/Instructions per [place] ED lab result protocol:   [facility] ED lab result protocol utilized: wound culture      Alycia Mayo RN

## 2024-11-19 ENCOUNTER — TELEPHONE (OUTPATIENT)
Dept: PODIATRY | Facility: CLINIC | Age: 84
End: 2024-11-19
Payer: COMMERCIAL

## 2024-11-19 NOTE — TELEPHONE ENCOUNTER
Brianne would like to know when Gregory is scheduled for his tendon cutting and when to hold his coumadin.     [phone]

## 2024-11-19 NOTE — LETTER
November 25, 2024      Gregory Zhong  [address] [facility] [address] [address]        To Whom It May Concern,     Greogry Zhong is scheduled for an in office procedure on 11/26/24.     Please hold his Coumadin the evening of 11/25/24.     If you have further questions, please contact our office.     Sincerely,         Jennifer Woods RN for Zena Workman DPM

## 2024-11-19 NOTE — TELEPHONE ENCOUNTER
Phone call to [facility]. She states they set up patient's medication and were told he has an appointment coming up involving cutting a tendon. They need to know when that appointment is and what the instructions are for holding his Coumadin.     Patient doesn't have any appointments scheduled. Will contact patient and get back with them. Hilary states the patient's son handles his appointments.     There is no consent to communicate on file for patient or sons.   Left voicemail for patient asking for a return call to the nurse. Scheduling number was provided.     Patient can be scheduled with Dr. Workman on 11/26 24 in the Post op time at 9:00 am. Hold placed. Patient will  need to arrive at 8:45 am.     BRENDEN Woods RN

## 2024-11-19 NOTE — LETTER
November 25, 2024      Gregory Zhong  [address] [address]        {Comm Man dear:[id number]}          Sincerely,        Nedra Workman DPM

## 2024-11-20 NOTE — TELEPHONE ENCOUNTER
"Phone call to son, Vikram Zhong. He was informed we are trying to reach patient regarding an appointment. The home number : [phone] is patient's land line. He is now living at an Assisted Living Facility, so would not get those messages.   He provided patient's cell: [phone]. He states patient does not always answer.   He suggested we wait later today or tomorrow before contacting patient as the patient was currently being very \"paranoid\".     Asked that he also let patient know that we are trying to reach him regarding an appointment. He verbalized understanding.    Please call patient on 11/21/24.     BRENDEN Woods RN        "

## 2024-11-20 NOTE — TELEPHONE ENCOUNTER
2nd message left for patient asking patient to return our call.   Will need to get back with Assisted Living after speaking with patient.     BRENDEN Woods RN

## 2024-11-22 NOTE — TELEPHONE ENCOUNTER
There is no consent on file.   Left voicemail on patient's cell asking for a return call regarding an appointment.     Patient can be scheduled with Dr. Workman on 11/26 24 in the Post op time at 9:00 am. Hold placed. Patient will  need to arrive at 8:45 am.     BRENDEN Woods RN

## 2024-11-25 NOTE — TELEPHONE ENCOUNTER
Phone call to son, Vikram Zhong, and he was informed we have not been able to reach the patient. Asked for his assistance. He attempted to do a conference call but patient did not answer.   He provides transportation for patient and will be bringing him to the appointment. Appointment scheduled for 11/26/24 at 9 am with 8:45 am check in.     He states the nursing home is asking about patient's warfarin. He was informed that writer will be contacting them. Asked that he have patient update his phone numbers at the appointment and to sign a consent to communicate. He verbalized understanding.     BRENDEN Woods RN

## 2024-11-25 NOTE — TELEPHONE ENCOUNTER
Phone call to JOSEPH Diop, [facility] [place].   She was informed that patient has an appointment on 11/26/24 at 9:00 am. Patient is to hold his Coumadin this evening for the procedure per AVS instructions dated 11/12/24. She asks for a written order. She was informed that writer can send a note signed by writer for Dr. Workman, and she is accepting of this.   Order will be faxed. She was very appreciative of the call.     Order was faxed to her at: [phone] and confirmed it went through via legalPAD.     BRENDEN Woods RN

## 2024-11-26 ENCOUNTER — OFFICE VISIT (OUTPATIENT)
Dept: PODIATRY | Facility: CLINIC | Age: 84
End: 2024-11-26
Payer: COMMERCIAL

## 2024-11-26 ENCOUNTER — ANCILLARY PROCEDURE (OUTPATIENT)
Dept: GENERAL RADIOLOGY | Facility: CLINIC | Age: 84
End: 2024-11-26
Attending: PODIATRIST
Payer: COMMERCIAL

## 2024-11-26 ENCOUNTER — TELEPHONE (OUTPATIENT)
Dept: PODIATRY | Facility: CLINIC | Age: 84
End: 2024-11-26

## 2024-11-26 ENCOUNTER — MEDICAL CORRESPONDENCE (OUTPATIENT)
Dept: HEALTH INFORMATION MANAGEMENT | Facility: CLINIC | Age: 84
End: 2024-11-26

## 2024-11-26 VITALS — SYSTOLIC BLOOD PRESSURE: 112 MMHG | WEIGHT: 206 LBS | DIASTOLIC BLOOD PRESSURE: 68 MMHG | BODY MASS INDEX: 26.45 KG/M2

## 2024-11-26 DIAGNOSIS — L97.512 RIGHT FOOT ULCER, WITH FAT LAYER EXPOSED (H): ICD-10-CM

## 2024-11-26 DIAGNOSIS — L97.512 RIGHT SECOND TOE ULCER, WITH FAT LAYER EXPOSED (H): ICD-10-CM

## 2024-11-26 DIAGNOSIS — L97.512 RIGHT SECOND TOE ULCER, WITH FAT LAYER EXPOSED (H): Primary | ICD-10-CM

## 2024-11-26 DIAGNOSIS — S91.104A: ICD-10-CM

## 2024-11-26 DIAGNOSIS — S93.104A: ICD-10-CM

## 2024-11-26 PROCEDURE — 99214 OFFICE O/P EST MOD 30 MIN: CPT | Mod: 25 | Performed by: PODIATRIST

## 2024-11-26 PROCEDURE — 73630 X-RAY EXAM OF FOOT: CPT | Mod: TC | Performed by: RADIOLOGY

## 2024-11-26 PROCEDURE — 11042 DBRDMT SUBQ TIS 1ST 20SQCM/<: CPT | Performed by: PODIATRIST

## 2024-11-26 PROCEDURE — 99024 POSTOP FOLLOW-UP VISIT: CPT | Performed by: PODIATRIST

## 2024-11-26 RX ORDER — CEPHALEXIN 500 MG/1
500 CAPSULE ORAL 2 TIMES DAILY
Qty: 20 CAPSULE | Refills: 11 | OUTPATIENT
Start: 2024-11-26 | End: 2024-12-06

## 2024-11-26 RX ORDER — CEPHALEXIN 500 MG/1
500 CAPSULE ORAL 2 TIMES DAILY
Qty: 20 CAPSULE | Refills: 0 | Status: SHIPPED | OUTPATIENT
Start: 2024-11-26 | End: 2024-12-06

## 2024-11-26 NOTE — PATIENT INSTRUCTIONS
Thank you for choosing [facility] / Foot & Ankle Surgery!    DR. PALOMINO'S LOCATIONS  [place] [facility] [place] APPOINTMENTS: [phone]   [address] TRIAGE NURSE: [phone]   [place] [address] RADIOLOGY: [phone]   FAX: [phone] BILLING QUESTIONS: [phone]    CONSUMER [facility] LINE:[phone]   [facility]    [facility] [id number]    [place] [address]    PH: [phone]    FAX: [phone]        [email or web address] or call [phone]  HAMMERTOE PADS / TOE SPLINTS          Dressing changes: Cont to clean second digit with wound cleanser. Pad dry. Paint betadine to base of the second

## 2024-11-26 NOTE — TELEPHONE ENCOUNTER
Phone call to Rosey at [facility]. They did not get a doctor's signature or date on the nurse communication form that was sent back with them. Rx was sent to the incorrect pharmacy. They will refax form for provider to complete and we will fax back.     BRENDEN Woods RN

## 2024-11-26 NOTE — TELEPHONE ENCOUNTER
Orders were faxed to [facility] by MA and prescription was resent to [facility] by provider. Phone call was made to [facility] to cancel the Cephalexin sent to their pharmacy.     Phone call to [facility]essa [facility] and confirmed they received the fax. She was appreciative of assistance.     BRENDEN Woods RN

## 2024-11-26 NOTE — TELEPHONE ENCOUNTER
Other: Rosey from [facility] just sent over the fax, she would like Jennifer to make sure she got it. Rosey's fax number:  [phone]     Could we send this information to you in [employer] or would you prefer to receive a phone call?:   Patient would prefer a phone call   Okay to leave a detailed message?: Yes at Other phone number:  [phone]

## 2024-11-26 NOTE — TELEPHONE ENCOUNTER
Patient Returning Call    Reason for call:  Ana Paula nurse from [facility] assisting living called. Need orders signed by Dr. Workamn for wound care and antibiotics , keflex for how long and what dosage. Please fax and signed to fax: [phone]      Information relayed to patient:  n/a    Patient has additional questions:  No      Could we send this information to you in [employer] or would you prefer to receive a phone call?:   Patient would prefer a phone call   Okay to leave a detailed message?: Yes at Cell number on file:    Telephone Information:   Mobile [phone]

## 2024-11-26 NOTE — PROGRESS NOTES
ASSESSMENT:       Assessment: 83 yr old male with chronic kidney disease, s/p partial right 1st ray and partial right 3rd toe amputations and repeat Excisional debridement right foot wound on 9/14  Second digit hammertoe with preulcerative lesions  Second digit plantar laceration, s/p recent trauma on 11/14     Plan:    -Discussed all findings with patient. Chart and imaging reviewed.   -Original right foot infection and ulcer is doing well. Dorsal foot site now closed. Third digit amputation site also closed.   -New area of concern is plantar second digit along the sulcus. Appears that patient had a slip and fall with a deep laceration here. Xrays taken today show a dislocation of unknown duration. Xrays from the ER did not show this.   -Discussed options with patient and son. Do not recommend pin fixation as is at high risk for infection, given this is almost two weeks old. Will complete keflex soon. New keflex provided given depth of laceration with some nonviable tissue. No cellulitis to site currently.   -Discussed toe splinting, but they don't think this is possible as the toe crest pad didn't stay after last appt.   -Debrided plantar sulcus today   -Cont dressing changes. Provided instructions. Betadine to site. Also orders provided for PT. WB to heel. Limited activity to essential only.   -Follow up in 2 weeks     Nedra Workman DPM    Greater than 30 minutes were spent today, reviewing previous hospital records, counseling and educating the patient on the ongoing treatment plan and coordinating care.     ____________________________________________________________________    HPI:       Gregory follows up posthospitalization from 9/9/2024 until 9/20/2024.  He was admitted with multiple diagnoses including sepsis, right lower extremity cellulitis, chronic right foot ulceration, osteomyelitis of the right sesamoids, great toe proximal phalanx and third toe.  On 9/12/2024 he underwent partial right first ray  resection, third digit amputation with excisional debridement by Dr. Workman.  On 9/14/2024 he was returned to the operating room for additional excisional debridement by Dr. Almendarez.  States is doing well help and is happy the wound vac has been discontinued. Is ready to be out of CAM boot, but okay with it. Is working with PT, but also using a wheelchair. No known issues.   11/12: follows up for right foot. IS walking more with PT. Still using CAM boot. Denies pain to his right foot. States drainage is less. Notes new areas that appear open to second digit.  11/26: Follows up for his right foot. Had apparent fall at the TCU with bleeding from second toe. States the ER placed sutures in the site. No pain from it now. No ongoing bleeding, but states had a large amount of bleeding initially   *  Past Medical History:   Diagnosis Date    Atrial flutter (H)     Bladder cancer (H)     Chronic atrial fibrillation (H)     Chronic kidney disease, stage III (moderate) (H)     Dyslipidemia     Gastroesophageal reflux disease     History of basal cell carcinoma     HLD (hyperlipidemia)     Hyperparathyroidism (H)     Hypertension     Mild cognitive impairment     Prostate cancer (H)     Tremor    *  *  Past Surgical History:   Procedure Laterality Date    AMPUTATE TOE(S) Right 9/12/2024    Procedure: Right foot partial first ray resection, right foot partial third digit amputation, right foot excisional debridement down to and including tendon, site measuring 7 cm x 4 cm x 2 cm;  Surgeon: Nedra Workman DPM;  Location: [facility] OR    APPENDECTOMY      Cryoablation of left and right renal masses      CYSTOSCOPY      Excision of basal cell carcinoma      IRRIGATION AND DEBRIDEMENT FOOT, COMBINED Right 9/14/2024    Procedure: Excisional debridement of necrotic tissue right foot;  Surgeon: Catarina Almendarez DPM, Podiatry/Foot and Ankle Surgery;  Location: [facility] OR    MOHS MICROGRAPHIC PROCEDURE      TONSILLECTOMY     *  *  Current  Outpatient Medications   Medication Sig Dispense Refill    alendronate (FOSAMAX) 70 MG tablet Take 70 mg by mouth every 7 days. On Saturdays      amLODIPine (NORVASC) 10 MG tablet Take 10 mg by mouth daily.      famotidine (PEPCID) 20 MG tablet Take 20 mg by mouth 2 times daily.      hypromellose-dextran (HYPROMELLOSE-DEXTRAN 0.3-0.1%) 0.1-0.3 % ophthalmic solution Place 1-2 drops into both eyes 4 times daily as needed for dry eyes.      isosorbide mononitrate (IMDUR) 60 MG 24 hr tablet Take 60 mg by mouth daily.      Multiple Vitamins-Minerals (PRESERVISION AREDS 2) CHEW Take 1 tablet by mouth 2 times daily.      patiromer (VELTASSA) 8.4 g packet Take 8.4 g by mouth. on Mon Wed Fri at 1500      rosuvastatin (CRESTOR) 10 MG tablet Take 10 mg by mouth daily.      vitamin D3 (CHOLECALCIFEROL) 50 mcg (2000 units) tablet Take 1 tablet by mouth daily.           EXAM:    Vitals: There were no vitals taken for this visit.  BMI: There is no height or weight on file to calculate BMI.    A1C: 5.2 (9/9/2024)     General:  Patient is alert and orientated.  NAD.     Vascular:  DP and PT pulses are palpable.  No varicosities noted  CFT's < 3secs.  Skin temp is normal.      Neuro:  Light and gross touch sensation diminished to feet.     Derm: Incision sites intact and mostly epithelalized at this point. Distal third digit now healed. Dorsal first ray site also healed  Second digit: distal preulcerative lsion is healed. Toe though grossly edematous and there's a new laceration to the plantar sulcus with nonviable tissue.     Musculoskeletal:  Previously amputated right great toe and partial right 3rd toe.       PROCEDURE:   Verbal consent was obtained for debridement. A 15 blade was used to excise the nonivable tissue to the ulcer, down to and including subcutaneous tissue. No noted purulence afterwards. Debrided site measures 2 cm x .5 x .4 cm. No probe to bone, but probes to deep subcutaneous tissue. Well tolerated. Site was  cleansed with alcohol.

## 2024-11-26 NOTE — LETTER
11/26/2024      Gregory Zhong  [address] [address]      Dear Colleague,    Thank you for referring your patient, Gregory Zhong, to the [facility]. Please see a copy of my visit note below.    ASSESSMENT:       Assessment: 83 yr old male with chronic kidney disease, s/p partial right 1st ray and partial right 3rd toe amputations and repeat Excisional debridement right foot wound on 9/14  Second digit hammertoe with preulcerative lesions  Second digit plantar laceration, s/p recent trauma on 11/14     Plan:    -Discussed all findings with patient. Chart and imaging reviewed.   -Original right foot infection and ulcer is doing well. Dorsal foot site now closed. Third digit amputation site also closed.   -New area of concern is plantar second digit along the sulcus. Appears that patient had a slip and fall with a deep laceration here. Xrays taken today show a dislocation of unknown duration. Xrays from the ER did not show this.   -Discussed options with patient and son. Do not recommend pin fixation as is at high risk for infection, given this is almost two weeks old. Will complete keflex soon. New keflex provided given depth of laceration with some nonviable tissue. No cellulitis to site currently.   -Discussed toe splinting, but they don't think this is possible as the toe crest pad didn't stay after last appt.   -Debrided plantar sulcus today   -Cont dressing changes. Provided instructions. Betadine to site. Also orders provided for PT. WB to heel. Limited activity to essential only.   -Follow up in 2 weeks     Nedra Workman DPM    Greater than 30 minutes were spent today, reviewing previous hospital records, counseling and educating the patient on the ongoing treatment plan and coordinating care.     ____________________________________________________________________    HPI:       Gregory follows up posthospitalization from 9/9/2024 until 9/20/2024.  He  was admitted with multiple diagnoses including sepsis, right lower extremity cellulitis, chronic right foot ulceration, osteomyelitis of the right sesamoids, great toe proximal phalanx and third toe.  On 9/12/2024 he underwent partial right first ray resection, third digit amputation with excisional debridement by Dr. Workman.  On 9/14/2024 he was returned to the operating room for additional excisional debridement by Dr. Almendarez.  States is doing well help and is happy the wound vac has been discontinued. Is ready to be out of CAM boot, but okay with it. Is working with PT, but also using a wheelchair. No known issues.   11/12: follows up for right foot. IS walking more with PT. Still using CAM boot. Denies pain to his right foot. States drainage is less. Notes new areas that appear open to second digit.  11/26: Follows up for his right foot. Had apparent fall at the TCU with bleeding from second toe. States the ER placed sutures in the site. No pain from it now. No ongoing bleeding, but states had a large amount of bleeding initially   *  Past Medical History:   Diagnosis Date     Atrial flutter (H)      Bladder cancer (H)      Chronic atrial fibrillation (H)      Chronic kidney disease, stage III (moderate) (H)      Dyslipidemia      Gastroesophageal reflux disease      History of basal cell carcinoma      HLD (hyperlipidemia)      Hyperparathyroidism (H)      Hypertension      Mild cognitive impairment      Prostate cancer (H)      Tremor    *  *  Past Surgical History:   Procedure Laterality Date     AMPUTATE TOE(S) Right 9/12/2024    Procedure: Right foot partial first ray resection, right foot partial third digit amputation, right foot excisional debridement down to and including tendon, site measuring 7 cm x 4 cm x 2 cm;  Surgeon: Nedra Workman DPM;  Location: [facility] OR     APPENDECTOMY       Cryoablation of left and right renal masses       CYSTOSCOPY       Excision of basal cell carcinoma       IRRIGATION  AND DEBRIDEMENT FOOT, COMBINED Right 9/14/2024    Procedure: Excisional debridement of necrotic tissue right foot;  Surgeon: Catarina Almendarez DPM, Podiatry/Foot and Ankle Surgery;  Location: [facility] OR     MOHS MICROGRAPHIC PROCEDURE       TONSILLECTOMY     *  *  Current Outpatient Medications   Medication Sig Dispense Refill     alendronate (FOSAMAX) 70 MG tablet Take 70 mg by mouth every 7 days. On Saturdays       amLODIPine (NORVASC) 10 MG tablet Take 10 mg by mouth daily.       famotidine (PEPCID) 20 MG tablet Take 20 mg by mouth 2 times daily.       hypromellose-dextran (HYPROMELLOSE-DEXTRAN 0.3-0.1%) 0.1-0.3 % ophthalmic solution Place 1-2 drops into both eyes 4 times daily as needed for dry eyes.       isosorbide mononitrate (IMDUR) 60 MG 24 hr tablet Take 60 mg by mouth daily.       Multiple Vitamins-Minerals (PRESERVISION AREDS 2) CHEW Take 1 tablet by mouth 2 times daily.       patiromer (VELTASSA) 8.4 g packet Take 8.4 g by mouth. on Mon Wed Fri at 1500       rosuvastatin (CRESTOR) 10 MG tablet Take 10 mg by mouth daily.       vitamin D3 (CHOLECALCIFEROL) 50 mcg (2000 units) tablet Take 1 tablet by mouth daily.           EXAM:    Vitals: There were no vitals taken for this visit.  BMI: There is no height or weight on file to calculate BMI.    A1C: 5.2 (9/9/2024)     General:  Patient is alert and orientated.  NAD.     Vascular:  DP and PT pulses are palpable.  No varicosities noted  CFT's < 3secs.  Skin temp is normal.      Neuro:  Light and gross touch sensation diminished to feet.     Derm: Incision sites intact and mostly epithelalized at this point. Distal third digit now healed. Dorsal first ray site also healed  Second digit: distal preulcerative lsion is healed. Toe though grossly edematous and there's a new laceration to the plantar sulcus with nonviable tissue.     Musculoskeletal:  Previously amputated right great toe and partial right 3rd toe.       PROCEDURE:   Verbal consent was obtained for  debridement. A 15 blade was used to excise the nonivable tissue to the ulcer, down to and including subcutaneous tissue. No noted purulence afterwards. Debrided site measures 2 cm x .5 x .4 cm. No probe to bone, but probes to deep subcutaneous tissue. Well tolerated. Site was cleansed with alcohol.         Again, thank you for allowing me to participate in the care of your patient.        Sincerely,        Nedra Workman DPM

## 2024-12-04 ENCOUNTER — HOSPITAL ENCOUNTER (OUTPATIENT)
Dept: MRI IMAGING | Facility: CLINIC | Age: 84
Discharge: HOME | End: 2024-12-04
Attending: PODIATRIST
Payer: COMMERCIAL

## 2024-12-04 ENCOUNTER — OFFICE VISIT (OUTPATIENT)
Dept: PODIATRY | Facility: CLINIC | Age: 84
End: 2024-12-04
Payer: COMMERCIAL

## 2024-12-04 VITALS — BODY MASS INDEX: 26.45 KG/M2 | DIASTOLIC BLOOD PRESSURE: 68 MMHG | WEIGHT: 206 LBS | SYSTOLIC BLOOD PRESSURE: 118 MMHG

## 2024-12-04 DIAGNOSIS — L97.512 RIGHT SECOND TOE ULCER, WITH FAT LAYER EXPOSED (H): Primary | ICD-10-CM

## 2024-12-04 DIAGNOSIS — Z89.431 STATUS POST PARTIAL AMPUTATION OF RIGHT FOOT (H): ICD-10-CM

## 2024-12-04 DIAGNOSIS — L97.512 RIGHT SECOND TOE ULCER, WITH FAT LAYER EXPOSED (H): ICD-10-CM

## 2024-12-04 DIAGNOSIS — M86.9 OSTEOMYELITIS OF SECOND TOE OF RIGHT FOOT (H): ICD-10-CM

## 2024-12-04 PROCEDURE — 73718 MRI LOWER EXTREMITY W/O DYE: CPT | Mod: RT

## 2024-12-04 NOTE — PROGRESS NOTES
ASSESSMENT:  Encounter Diagnoses   Name Primary?    Right second toe ulcer, with fat layer exposed, probing to bone (H) Yes    Suspected osteomyelitis of second toe of right foot (H)     Status post partial amputation of right foot (H)      MEDICAL DECISION MAKING:  I personally reviewed the 10/26/2024 x-ray images.  Complete dislocation of the proximal interphalangeal joint, right second toe.  No obvious lytic or erosive changes to suggest advanced osteomyelitis.    Given that the plantar wound is deep and probes to a hard endpoint, and the extensive edema of the right second toe, I highly suspect osteomyelitis.    Systemically he is stable and there are no signs of an unstable soft tissue infection in the foot.  STAT MRI to evaluate for osteomyelitis and extent of osteomyelitis    I explained that if osteomyelitis is supported by MRI, we will likely recommend surgical intervention.  Given the location of the wound and the dislocation, this will likely involve at least total amputation of the right second toe.    Gregory is to present to the emergency department if he starts to feel ill which might include fever, chills, nausea, vomiting, generalized weakness, loss of appetite.    Follow-up with Dr. Workman on 12/11/2024 as scheduled.    The above information/instructions and wound care instructions were outlined in a signed order form for his care facility.    Greater than 30 minutes was spent on this patient's care, date of service 12/4/2024.  This included review of a fairly extensive recent medical history, clinical exam, personal review of the x-ray images, discussion regarding the MRI and possible need for surgery, completing an order form for his care facility, and documenting today's encounter.    Disclaimer: This note consists of symbols derived from keyboarding, dictation and/or voice recognition software. As a result, there may be errors in the script that have gone undetected. Please consider this when  "interpreting information found in this chart.    Aman Vogel DPM, FACLAUREEN, MS    [facility] of Podiatry/Foot & Ankle Surgery       Media Information    Document Information    Other: Photograph   Right second toe   12/04/2024 9:10 AM   Attached To:   Office Visit on 12/4/24 with Aman Vogel DPM   Source Information    Aman Vogel DPM  [facility]   Document History      ____________________________________________________________________    HPI:       Gregory is accompanied by his son today.  It was requested that he be evaluated in podiatry, as his care facility was concerned about an open wound on the right second toe.  He was evaluated by Dr. Workman recently, on 11/26/2024.  Extensive history related to the right foot as outlined below, taken from Dr. Workman's clinic note.    Gregory denies pain.  He denies nausea, vomiting, fever and chills.  No change in appetite and stating \"sometimes too good.  \"    Relevant history:  Gregory follows up posthospitalization from 9/9/2024 until 9/20/2024.  He was admitted with multiple diagnoses including sepsis, right lower extremity cellulitis, chronic right foot ulceration, osteomyelitis of the right sesamoids, great toe proximal phalanx and third toe.  On 9/12/2024 he underwent partial right first ray resection, third digit amputation with excisional debridement by Dr. Workman.  On 9/14/2024 he was returned to the operating room for additional excisional debridement by Dr. Almendarez.  States is doing well help and is happy the wound vac has been discontinued. Is ready to be out of CAM boot, but okay with it. Is working with PT, but also using a wheelchair. No known issues.   11/12: follows up for right foot. IS walking more with PT. Still using CAM boot. Denies pain to his right foot. States drainage is less. Notes new areas that appear open to second digit.  11/26: Follows up for his right foot. Had apparent fall at the TCU with bleeding from " second toe. States the ER placed sutures in the site. No pain from it now. No ongoing bleeding, but states had a large amount of bleeding initially     Past Medical History:   Diagnosis Date    Atrial flutter (H)     Bladder cancer (H)     Chronic atrial fibrillation (H)     Chronic kidney disease, stage III (moderate) (H)     Dyslipidemia     Gastroesophageal reflux disease     History of basal cell carcinoma     HLD (hyperlipidemia)     Hyperparathyroidism (H)     Hypertension     Mild cognitive impairment     Prostate cancer (H)     Tremor    *  *  Past Surgical History:   Procedure Laterality Date    AMPUTATE TOE(S) Right 9/12/2024    Procedure: Right foot partial first ray resection, right foot partial third digit amputation, right foot excisional debridement down to and including tendon, site measuring 7 cm x 4 cm x 2 cm;  Surgeon: Nedra Workman DPM;  Location: [facility] OR    APPENDECTOMY      Cryoablation of left and right renal masses      CYSTOSCOPY      Excision of basal cell carcinoma      IRRIGATION AND DEBRIDEMENT FOOT, COMBINED Right 9/14/2024    Procedure: Excisional debridement of necrotic tissue right foot;  Surgeon: Catarina Almendarez DPM, Podiatry/Foot and Ankle Surgery;  Location: [facility] OR    MOHS MICROGRAPHIC PROCEDURE      TONSILLECTOMY     *  *  Current Outpatient Medications   Medication Sig Dispense Refill    alendronate (FOSAMAX) 70 MG tablet Take 70 mg by mouth every 7 days. On Saturdays      amLODIPine (NORVASC) 10 MG tablet Take 10 mg by mouth daily.      cephALEXin (KEFLEX) 500 MG capsule Take 1 capsule (500 mg) by mouth 2 times daily for 10 days. 20 capsule 0    cephALEXin (KEFLEX) 500 MG capsule Take 1 capsule (500 mg) by mouth 2 times daily for 10 days. 20 capsule 0    famotidine (PEPCID) 20 MG tablet Take 20 mg by mouth 2 times daily.      hypromellose-dextran (HYPROMELLOSE-DEXTRAN 0.3-0.1%) 0.1-0.3 % ophthalmic solution Place 1-2 drops into both eyes 4 times daily as needed for dry eyes.       isosorbide mononitrate (IMDUR) 60 MG 24 hr tablet Take 60 mg by mouth daily.      Multiple Vitamins-Minerals (PRESERVISION AREDS 2) CHEW Take 1 tablet by mouth 2 times daily.      patiromer (VELTASSA) 8.4 g packet Take 8.4 g by mouth. on Mon Wed Fri at 1500      rosuvastatin (CRESTOR) 10 MG tablet Take 10 mg by mouth daily.      vitamin D3 (CHOLECALCIFEROL) 50 mcg (2000 units) tablet Take 1 tablet by mouth daily.           EXAM:    Vitals: /68   Wt 93.4 kg (206 lb)   BMI 26.45 kg/m    BMI: Body mass index is 26.45 kg/m .    Vasc:    Pulses palpable, CFT minimally delayed  Significant generalized edema of the right second toe    Neuro:    Light touch sensation profoundly diminished to all sensory nerve distributions, right foot    Derm:    Transverse wound at the plantar base of the right second toe.  This probes to a hard endpoint.  No malodor.  No purulence.  No erythema.    MSK:    Status post partial right first ray amputation.  Digital contractures.  Dorsally dislocating right second toe.    Calf:    Neg for redness, swelling or tenderness    X-Ray Findings:  I personally reviewed the 10/26/2024 images.  See comments above.

## 2024-12-04 NOTE — LETTER
12/4/2024      Gregory Zhong  [address] [id number]      Dear Colleague,    Thank you for referring your patient, Gregory Zhong, to the [facility]. Please see a copy of my visit note below.    ASSESSMENT:  Encounter Diagnoses   Name Primary?     Right second toe ulcer, with fat layer exposed, probing to bone (H) Yes     Suspected osteomyelitis of second toe of right foot (H)      Status post partial amputation of right foot (H)      MEDICAL DECISION MAKING:  I personally reviewed the 10/26/2024 x-ray images.  Complete dislocation of the proximal interphalangeal joint, right second toe.  No obvious lytic or erosive changes to suggest advanced osteomyelitis.    Given that the plantar wound is deep and probes to a hard endpoint, and the extensive edema of the right second toe, I highly suspect osteomyelitis.    Systemically he is stable and there are no signs of an unstable soft tissue infection in the foot.  STAT MRI to evaluate for osteomyelitis and extent of osteomyelitis    I explained that if osteomyelitis is supported by MRI, we will likely recommend surgical intervention.  Given the location of the wound and the dislocation, this will likely involve at least total amputation of the right second toe.    Gregory is to present to the emergency department if he starts to feel ill which might include fever, chills, nausea, vomiting, generalized weakness, loss of appetite.    Follow-up with Dr. Workman on 12/11/2024 as scheduled.    The above information/instructions and wound care instructions were outlined in a signed order form for his care facility.    Greater than 30 minutes was spent on this patient's care, date of service 12/4/2024.  This included review of a fairly extensive recent medical history, clinical exam, personal review of the x-ray images, discussion regarding the MRI and possible need for surgery, completing an order form for his care  "facility, and documenting today's encounter.    Disclaimer: This note consists of symbols derived from keyboarding, dictation and/or voice recognition software. As a result, there may be errors in the script that have gone undetected. Please consider this when interpreting information found in this chart.    Aman Vogel DPM, FACFAS, MS    [place] Department of Podiatry/Foot & Ankle Surgery      ____________________________________________________________________    HPI:       Gregory is accompanied by his son today.  It was requested that he be evaluated in podiatry, as his care facility was concerned about an open wound on the right second toe.  He was evaluated by Dr. Workman recently, on 11/26/2024.  Extensive history related to the right foot as outlined below, taken from Dr. Workman's clinic note.    Gregory denies pain.  He denies nausea, vomiting, fever and chills.  No change in appetite and stating \"sometimes too good.  \"    Relevant history:  Gregory follows up posthospitalization from 9/9/2024 until 9/20/2024.  He was admitted with multiple diagnoses including sepsis, right lower extremity cellulitis, chronic right foot ulceration, osteomyelitis of the right sesamoids, great toe proximal phalanx and third toe.  On 9/12/2024 he underwent partial right first ray resection, third digit amputation with excisional debridement by Dr. Workman.  On 9/14/2024 he was returned to the operating room for additional excisional debridement by Dr. Almendarez.  States is doing well help and is happy the wound vac has been discontinued. Is ready to be out of CAM boot, but okay with it. Is working with PT, but also using a wheelchair. No known issues.   11/12: follows up for right foot. IS walking more with PT. Still using CAM boot. Denies pain to his right foot. States drainage is less. Notes new areas that appear open to second digit.  11/26: Follows up for his right foot. Had apparent fall at the TCU with bleeding from second " toe. States the ER placed sutures in the site. No pain from it now. No ongoing bleeding, but states had a large amount of bleeding initially     Past Medical History:   Diagnosis Date     Atrial flutter (H)      Bladder cancer (H)      Chronic atrial fibrillation (H)      Chronic kidney disease, stage III (moderate) (H)      Dyslipidemia      Gastroesophageal reflux disease      History of basal cell carcinoma      HLD (hyperlipidemia)      Hyperparathyroidism (H)      Hypertension      Mild cognitive impairment      Prostate cancer (H)      Tremor    *  *  Past Surgical History:   Procedure Laterality Date     AMPUTATE TOE(S) Right 9/12/2024    Procedure: Right foot partial first ray resection, right foot partial third digit amputation, right foot excisional debridement down to and including tendon, site measuring 7 cm x 4 cm x 2 cm;  Surgeon: Nedra Workman DPM;  Location: [facility] OR     APPENDECTOMY       Cryoablation of left and right renal masses       CYSTOSCOPY       Excision of basal cell carcinoma       IRRIGATION AND DEBRIDEMENT FOOT, COMBINED Right 9/14/2024    Procedure: Excisional debridement of necrotic tissue right foot;  Surgeon: Catarina Almendarez DPM, Podiatry/Foot and Ankle Surgery;  Location: [facility] OR     MOHS MICROGRAPHIC PROCEDURE       TONSILLECTOMY     *  *  Current Outpatient Medications   Medication Sig Dispense Refill     alendronate (FOSAMAX) 70 MG tablet Take 70 mg by mouth every 7 days. On Saturdays       amLODIPine (NORVASC) 10 MG tablet Take 10 mg by mouth daily.       cephALEXin (KEFLEX) 500 MG capsule Take 1 capsule (500 mg) by mouth 2 times daily for 10 days. 20 capsule 0     cephALEXin (KEFLEX) 500 MG capsule Take 1 capsule (500 mg) by mouth 2 times daily for 10 days. 20 capsule 0     famotidine (PEPCID) 20 MG tablet Take 20 mg by mouth 2 times daily.       hypromellose-dextran (HYPROMELLOSE-DEXTRAN 0.3-0.1%) 0.1-0.3 % ophthalmic solution Place 1-2 drops into both eyes 4 times daily as  needed for dry eyes.       isosorbide mononitrate (IMDUR) 60 MG 24 hr tablet Take 60 mg by mouth daily.       Multiple Vitamins-Minerals (PRESERVISION AREDS 2) CHEW Take 1 tablet by mouth 2 times daily.       patiromer (VELTASSA) 8.4 g packet Take 8.4 g by mouth. on Mon Wed Fri at 1500       rosuvastatin (CRESTOR) 10 MG tablet Take 10 mg by mouth daily.       vitamin D3 (CHOLECALCIFEROL) 50 mcg (2000 units) tablet Take 1 tablet by mouth daily.           EXAM:    Vitals: /68   Wt 93.4 kg (206 lb)   BMI 26.45 kg/m    BMI: Body mass index is 26.45 kg/m .    Vasc:    Pulses palpable, CFT minimally delayed  Significant generalized edema of the right second toe    Neuro:    Light touch sensation profoundly diminished to all sensory nerve distributions, right foot    Derm:    Transverse wound at the plantar base of the right second toe.  This probes to a hard endpoint.  No malodor.  No purulence.  No erythema.    MSK:    Status post partial right first ray amputation.  Digital contractures.  Dorsally dislocating right second toe.    Calf:    Neg for redness, swelling or tenderness    X-Ray Findings:  I personally reviewed the 10/26/2024 images.  See comments above.          Again, thank you for allowing me to participate in the care of your patient.        Sincerely,        Aman Vogel DPM

## 2024-12-04 NOTE — Clinical Note
FYI.  A stat MRI is ordered.  I suspect osteomyelitis of the second toe and likely need for amputation.  Systemically stable and the foot is stable.  Has a follow-up with you next Wednesday.  If MRI supports osteomyelitis, presenting to the ED versus trying to schedule something on an outpatient basis reasonable.  I probably will not be able to do outpatient surgery as I leave for out of town next Friday.

## 2024-12-04 NOTE — PATIENT INSTRUCTIONS
Thank you for choosing [facility] Podiatry / Foot & Ankle Surgery!    DR. VERA'S CLINIC LOCATIONS:     [facility] TRIAGE LINE: [phone]   [address] [place] [address] APPOINTMENTS: [phone]   [place] [address] RADIOLOGY: [phone]   (Every other Tues - Wed - Fri PM) SET UP SURGERY: [phone]    PHYSICAL THERAPY: [phone]   [facility] BILLING QUESTIONS: [phone] [address]  [address] FAX: [phone]   [place] [address]    (Thurs & Fri AM)         Please call to schedule your MRI/CT/Ultrasound/Arthrogram appointment.  The number is [phone].    NEW ORDERS/INSTRUCTIONS FOR TCU STAFF:    December 4, 2024      WEIGHT BEARING STATUS/ ACTIVITY: Weightbearing as tolerated in the surgical shoe    DRESSING: Daily -cleanse the right second toe, blot dry, paint with Betadine and cover the wound with a dry dressing.    OTHER: A stat MRI is ordered to evaluate for likely osteomyelitis involving this toe.  If the MRI supports osteomyelitis/bone infection, surgery will be recommended.  If this will be same-day surgery versus hospitalization, to be determined.    Gregory is systemically stable and reports having a good appetite.  Should he start to feel ill with symptoms that might include fever, nausea, vomiting, chills, generalized weakness, he should be taken to the emergency department.    FOLLOW UP: Recommend follow-up with Dr. Workman in 1 week - 12/11/2024.      ____________________________________________  Signature      Aman Vera DPM  [facility]

## 2024-12-05 ENCOUNTER — TELEPHONE (OUTPATIENT)
Dept: PODIATRY | Facility: CLINIC | Age: 84
End: 2024-12-05
Payer: COMMERCIAL

## 2024-12-05 NOTE — TELEPHONE ENCOUNTER
I called the designated phone number that is listed in epic and left a voicemail for Gregory's son, Vikram.    I reviewed the MRI results which support osteomyelitis of the right second toe.    I discussed findings with Dr. Workman.  We both recommend amputation of this toe.    Given his overall stable condition, this could be managed on an outpatient basis in same-day surgery.  The other option is that Gregory present to the emergency department for admission.    I have also sent a [employer] message to them.    Dr. Vogel

## 2024-12-09 ENCOUNTER — TELEPHONE (OUTPATIENT)
Dept: PODIATRY | Facility: CLINIC | Age: 84
End: 2024-12-09
Payer: COMMERCIAL

## 2024-12-09 RX ORDER — MIRTAZAPINE 7.5 MG/1
7.5 TABLET, FILM COATED ORAL AT BEDTIME
Status: ON HOLD | COMMUNITY
Start: 2024-11-27 | End: 2024-12-19

## 2024-12-09 RX ORDER — MIDODRINE HYDROCHLORIDE 2.5 MG/1
2.5 TABLET ORAL DAILY PRN
Status: ON HOLD | COMMUNITY
Start: 2024-10-11 | End: 2024-12-19

## 2024-12-09 RX ORDER — WARFARIN SODIUM 7.5 MG/1
TABLET ORAL
COMMUNITY
Start: 2024-12-06

## 2024-12-09 RX ORDER — WARFARIN SODIUM 5 MG/1
TABLET ORAL
COMMUNITY
Start: 2024-12-06

## 2024-12-09 RX ORDER — POLYETHYLENE GLYCOL 3350 17 G/17G
1 POWDER, FOR SOLUTION ORAL DAILY
COMMUNITY
Start: 2024-10-22

## 2024-12-09 NOTE — TELEPHONE ENCOUNTER
Reason for call: Patient is scheduled for toe amputation on 12/12. They would like to discuss the patients risk for bleeding and recommendations for warfarin.    Other phone number:  [phone]  Can ask to speak with any nurse

## 2024-12-09 NOTE — TELEPHONE ENCOUNTER
Phone call to [employer] include 234 beds at the [facility] INR clinic through patient's primary care provider. She was informed that writer had spoken with [facility]isted [facility] on 12/6/24 and patient is to hold his Warfarin on 12/10 and 12/11. She states they told her they were holding this evening as well.     She was informed that Dr. Workman wants his INR below 2. She states they have different recommendations but she will see if PCP will sign the order based on Dr. Workman's recommendations. If not, she will call us back.     She asks that we contact the Assisted Living as it seems they do not have the correct information of when to start holding Warfarin. Will do so.     BRENDEN Woods RN

## 2024-12-09 NOTE — TELEPHONE ENCOUNTER
Phone call to DIANA Joseph, [facility]. He was informed that there is a miscommunication of when to hold patient's warfarin. He states patient's son, Vikram had received information from PCP and then relayed to them to hold Warfarin starting 12/9/24 evening. Jake was informed that per the surgeon, patient is to wait to hold the warfarin starting 12/10/24 and 12/11/24. He verbalized understanding.     BRENDEN Woods RN

## 2024-12-10 ENCOUNTER — TELEPHONE (OUTPATIENT)
Dept: PODIATRY | Facility: CLINIC | Age: 84
End: 2024-12-10
Payer: COMMERCIAL

## 2024-12-10 NOTE — TELEPHONE ENCOUNTER
Other: Doreen from [facility] [email or web address] is requesting a call back to verify that the clinic received PREOP notes this morning?     Writer verified the correct fax #.    Please call Doreen back to verify and yes a VM can be left.

## 2024-12-12 ENCOUNTER — ANESTHESIA EVENT (OUTPATIENT)
Dept: SURGERY | Facility: CLINIC | Age: 84
End: 2024-12-12
Payer: COMMERCIAL

## 2024-12-12 ENCOUNTER — MEDICAL CORRESPONDENCE (OUTPATIENT)
Dept: HEALTH INFORMATION MANAGEMENT | Facility: CLINIC | Age: 84
End: 2024-12-12

## 2024-12-12 ENCOUNTER — ANESTHESIA (OUTPATIENT)
Dept: SURGERY | Facility: CLINIC | Age: 84
End: 2024-12-12
Payer: COMMERCIAL

## 2024-12-12 ENCOUNTER — HOSPITAL ENCOUNTER (OUTPATIENT)
Facility: CLINIC | Age: 84
Discharge: HOME | End: 2024-12-12
Attending: PODIATRIST | Admitting: PODIATRIST
Payer: COMMERCIAL

## 2024-12-12 VITALS
OXYGEN SATURATION: 100 % | BODY MASS INDEX: 25.76 KG/M2 | HEART RATE: 57 BPM | TEMPERATURE: 97.3 F | DIASTOLIC BLOOD PRESSURE: 82 MMHG | RESPIRATION RATE: 15 BRPM | SYSTOLIC BLOOD PRESSURE: 126 MMHG | WEIGHT: 200.6 LBS

## 2024-12-12 DIAGNOSIS — L03.115 CELLULITIS OF RIGHT FOOT: Primary | ICD-10-CM

## 2024-12-12 LAB
BACTERIA SPEC CULT: ABNORMAL
GRAM STAIN RESULT: ABNORMAL
GRAM STAIN RESULT: ABNORMAL

## 2024-12-12 PROCEDURE — 710N000012 HC RECOVERY PHASE 2, PER MINUTE: Performed by: PODIATRIST

## 2024-12-12 PROCEDURE — 28122 PARTIAL REMOVAL OF FOOT BONE: CPT | Mod: T9 | Performed by: PODIATRIST

## 2024-12-12 PROCEDURE — 87205 SMEAR GRAM STAIN: CPT | Performed by: PODIATRIST

## 2024-12-12 PROCEDURE — 258N000003 HC RX IP 258 OP 636: Performed by: NURSE ANESTHETIST, CERTIFIED REGISTERED

## 2024-12-12 PROCEDURE — 28825 PARTIAL AMPUTATION OF TOE: CPT | Mod: T6 | Performed by: PODIATRIST

## 2024-12-12 PROCEDURE — 250N000009 HC RX 250: Performed by: PODIATRIST

## 2024-12-12 PROCEDURE — 999N000141 HC STATISTIC PRE-PROCEDURE NURSING ASSESSMENT: Performed by: PODIATRIST

## 2024-12-12 PROCEDURE — 250N000011 HC RX IP 250 OP 636: Performed by: PODIATRIST

## 2024-12-12 PROCEDURE — 88311 DECALCIFY TISSUE: CPT | Mod: 26 | Performed by: PATHOLOGY

## 2024-12-12 PROCEDURE — 360N000076 HC SURGERY LEVEL 3, PER MIN: Performed by: PODIATRIST

## 2024-12-12 PROCEDURE — 87070 CULTURE OTHR SPECIMN AEROBIC: CPT | Performed by: PODIATRIST

## 2024-12-12 PROCEDURE — 370N000017 HC ANESTHESIA TECHNICAL FEE, PER MIN: Performed by: PODIATRIST

## 2024-12-12 PROCEDURE — 272N000001 HC OR GENERAL SUPPLY STERILE: Performed by: PODIATRIST

## 2024-12-12 PROCEDURE — 88304 TISSUE EXAM BY PATHOLOGIST: CPT | Mod: 26 | Performed by: PATHOLOGY

## 2024-12-12 PROCEDURE — 88304 TISSUE EXAM BY PATHOLOGIST: CPT | Mod: TC | Performed by: PODIATRIST

## 2024-12-12 PROCEDURE — 250N000011 HC RX IP 250 OP 636: Performed by: NURSE ANESTHETIST, CERTIFIED REGISTERED

## 2024-12-12 PROCEDURE — 250N000011 HC RX IP 250 OP 636: Mod: JW | Performed by: PODIATRIST

## 2024-12-12 PROCEDURE — 88305 TISSUE EXAM BY PATHOLOGIST: CPT | Mod: 26 | Performed by: PATHOLOGY

## 2024-12-12 PROCEDURE — 87076 CULTURE ANAEROBE IDENT EACH: CPT | Performed by: PODIATRIST

## 2024-12-12 RX ORDER — SODIUM CHLORIDE, SODIUM LACTATE, POTASSIUM CHLORIDE, CALCIUM CHLORIDE 600; 310; 30; 20 MG/100ML; MG/100ML; MG/100ML; MG/100ML
INJECTION, SOLUTION INTRAVENOUS CONTINUOUS
Status: DISCONTINUED | OUTPATIENT
Start: 2024-12-12 | End: 2024-12-12 | Stop reason: HOSPADM

## 2024-12-12 RX ORDER — PROPOFOL 10 MG/ML
INJECTION, EMULSION INTRAVENOUS CONTINUOUS PRN
Status: DISCONTINUED | OUTPATIENT
Start: 2024-12-12 | End: 2024-12-12

## 2024-12-12 RX ORDER — DEXAMETHASONE SODIUM PHOSPHATE 4 MG/ML
4 INJECTION, SOLUTION INTRA-ARTICULAR; INTRALESIONAL; INTRAMUSCULAR; INTRAVENOUS; SOFT TISSUE
Status: DISCONTINUED | OUTPATIENT
Start: 2024-12-12 | End: 2024-12-12 | Stop reason: HOSPADM

## 2024-12-12 RX ORDER — CEFAZOLIN SODIUM/WATER 2 G/20 ML
2 SYRINGE (ML) INTRAVENOUS
Status: COMPLETED | OUTPATIENT
Start: 2024-12-12 | End: 2024-12-12

## 2024-12-12 RX ORDER — OXYCODONE HYDROCHLORIDE 5 MG/1
10 TABLET ORAL EVERY 4 HOURS PRN
Status: DISCONTINUED | OUTPATIENT
Start: 2024-12-12 | End: 2024-12-12 | Stop reason: HOSPADM

## 2024-12-12 RX ORDER — ONDANSETRON 2 MG/ML
4 INJECTION INTRAMUSCULAR; INTRAVENOUS EVERY 30 MIN PRN
Status: DISCONTINUED | OUTPATIENT
Start: 2024-12-12 | End: 2024-12-12 | Stop reason: HOSPADM

## 2024-12-12 RX ORDER — PROPOFOL 10 MG/ML
INJECTION, EMULSION INTRAVENOUS PRN
Status: DISCONTINUED | OUTPATIENT
Start: 2024-12-12 | End: 2024-12-12

## 2024-12-12 RX ORDER — BUPIVACAINE HYDROCHLORIDE 5 MG/ML
INJECTION, SOLUTION EPIDURAL; INTRACAUDAL PRN
Status: DISCONTINUED | OUTPATIENT
Start: 2024-12-12 | End: 2024-12-12 | Stop reason: HOSPADM

## 2024-12-12 RX ORDER — ONDANSETRON 4 MG/1
4 TABLET, ORALLY DISINTEGRATING ORAL EVERY 30 MIN PRN
Status: DISCONTINUED | OUTPATIENT
Start: 2024-12-12 | End: 2024-12-12 | Stop reason: HOSPADM

## 2024-12-12 RX ORDER — GLYCOPYRROLATE 0.2 MG/ML
INJECTION, SOLUTION INTRAMUSCULAR; INTRAVENOUS PRN
Status: DISCONTINUED | OUTPATIENT
Start: 2024-12-12 | End: 2024-12-12

## 2024-12-12 RX ORDER — FENTANYL CITRATE 50 UG/ML
25 INJECTION, SOLUTION INTRAMUSCULAR; INTRAVENOUS
Status: DISCONTINUED | OUTPATIENT
Start: 2024-12-12 | End: 2024-12-12 | Stop reason: HOSPADM

## 2024-12-12 RX ORDER — CEPHALEXIN 500 MG/1
500 CAPSULE ORAL 2 TIMES DAILY
Qty: 12 CAPSULE | Refills: 0 | Status: ON HOLD | OUTPATIENT
Start: 2024-12-12 | End: 2024-12-19

## 2024-12-12 RX ORDER — NALOXONE HYDROCHLORIDE 0.4 MG/ML
0.1 INJECTION, SOLUTION INTRAMUSCULAR; INTRAVENOUS; SUBCUTANEOUS
Status: DISCONTINUED | OUTPATIENT
Start: 2024-12-12 | End: 2024-12-12 | Stop reason: HOSPADM

## 2024-12-12 RX ORDER — CEFAZOLIN SODIUM/WATER 2 G/20 ML
2 SYRINGE (ML) INTRAVENOUS SEE ADMIN INSTRUCTIONS
Status: DISCONTINUED | OUTPATIENT
Start: 2024-12-12 | End: 2024-12-12 | Stop reason: HOSPADM

## 2024-12-12 RX ORDER — MAGNESIUM HYDROXIDE 1200 MG/15ML
LIQUID ORAL PRN
Status: DISCONTINUED | OUTPATIENT
Start: 2024-12-12 | End: 2024-12-12 | Stop reason: HOSPADM

## 2024-12-12 RX ORDER — LIDOCAINE 40 MG/G
CREAM TOPICAL
Status: DISCONTINUED | OUTPATIENT
Start: 2024-12-12 | End: 2024-12-12 | Stop reason: HOSPADM

## 2024-12-12 RX ORDER — SODIUM CHLORIDE, SODIUM LACTATE, POTASSIUM CHLORIDE, CALCIUM CHLORIDE 600; 310; 30; 20 MG/100ML; MG/100ML; MG/100ML; MG/100ML
INJECTION, SOLUTION INTRAVENOUS CONTINUOUS PRN
Status: DISCONTINUED | OUTPATIENT
Start: 2024-12-12 | End: 2024-12-12

## 2024-12-12 RX ORDER — OXYCODONE HYDROCHLORIDE 5 MG/1
5 TABLET ORAL EVERY 4 HOURS PRN
Status: DISCONTINUED | OUTPATIENT
Start: 2024-12-12 | End: 2024-12-12 | Stop reason: HOSPADM

## 2024-12-12 RX ORDER — FENTANYL CITRATE 50 UG/ML
INJECTION, SOLUTION INTRAMUSCULAR; INTRAVENOUS PRN
Status: DISCONTINUED | OUTPATIENT
Start: 2024-12-12 | End: 2024-12-12

## 2024-12-12 RX ADMIN — PROPOFOL 50 MCG/KG/MIN: 10 INJECTION, EMULSION INTRAVENOUS at 10:09

## 2024-12-12 RX ADMIN — PROPOFOL 20 MG: 10 INJECTION, EMULSION INTRAVENOUS at 10:50

## 2024-12-12 RX ADMIN — FENTANYL CITRATE 50 MCG: 50 INJECTION INTRAMUSCULAR; INTRAVENOUS at 10:11

## 2024-12-12 RX ADMIN — PROPOFOL 30 MG: 10 INJECTION, EMULSION INTRAVENOUS at 10:14

## 2024-12-12 RX ADMIN — GLYCOPYRROLATE 0.2 MG: 0.2 INJECTION, SOLUTION INTRAMUSCULAR; INTRAVENOUS at 10:18

## 2024-12-12 RX ADMIN — Medication 2 G: at 10:01

## 2024-12-12 RX ADMIN — SODIUM CHLORIDE, POTASSIUM CHLORIDE, SODIUM LACTATE AND CALCIUM CHLORIDE: 600; 310; 30; 20 INJECTION, SOLUTION INTRAVENOUS at 10:01

## 2024-12-12 ASSESSMENT — ACTIVITIES OF DAILY LIVING (ADL)
ADLS_ACUITY_SCORE: 57
ADLS_ACUITY_SCORE: 51
ADLS_ACUITY_SCORE: 51
ADLS_ACUITY_SCORE: 57

## 2024-12-12 ASSESSMENT — ENCOUNTER SYMPTOMS: DYSRHYTHMIAS: 1

## 2024-12-12 NOTE — ANESTHESIA PREPROCEDURE EVALUATION
Anesthesia Pre-Procedure Evaluation    Patient: Gregory Zhong   MRN: [id number] : 1940        Procedure : Procedure(s):  Right foot second digit amputation,  right foot debridement          Past Medical History:   Diagnosis Date    Atrial flutter (H)     Bladder cancer (H)     Chronic atrial fibrillation (H)     Chronic kidney disease, stage III (moderate) (H)     Dyslipidemia     Gastroesophageal reflux disease     History of basal cell carcinoma     HLD (hyperlipidemia)     Hyperparathyroidism (H)     Hypertension     Mild cognitive impairment     Prostate cancer (H)     Tremor       Past Surgical History:   Procedure Laterality Date    AMPUTATE TOE(S) Right 2024    Procedure: Right foot partial first ray resection, right foot partial third digit amputation, right foot excisional debridement down to and including tendon, site measuring 7 cm x 4 cm x 2 cm;  Surgeon: Nedra Workman DPM;  Location: [facility] OR    APPENDECTOMY      Cryoablation of left and right renal masses      CYSTOSCOPY      Excision of basal cell carcinoma      IRRIGATION AND DEBRIDEMENT FOOT, COMBINED Right 2024    Procedure: Excisional debridement of necrotic tissue right foot;  Surgeon: Catarina Almendarez DPM, Podiatry/Foot and Ankle Surgery;  Location: [facility] OR    MOHS MICROGRAPHIC PROCEDURE      TONSILLECTOMY        No Known Allergies   Social History     Tobacco Use    Smoking status: Never    Smokeless tobacco: Never   Substance Use Topics    Alcohol use: Not on file      Wt Readings from Last 1 Encounters:   24 91 kg (200 lb 9.6 oz)        Anesthesia Evaluation   Pt has had prior anesthetic. Type: General.    No history of anesthetic complications       ROS/MED HX  ENT/Pulmonary:  - neg pulmonary ROS     Neurologic:     (+)   dementia,                             Cardiovascular:     (+)  hypertension- -   -  - -                        dysrhythmias, a-fib and a-flutter, Irregular Heartbeat/Palpitations,           "  METS/Exercise Tolerance:     Hematologic:  - neg hematologic  ROS     Musculoskeletal:  - neg musculoskeletal ROS     GI/Hepatic:     (+) GERD, Asymptomatic on medication,                  Renal/Genitourinary:     (+) renal disease, type: CRI,            Endo:  - neg endo ROS  Type I DM: infected second toe.   Psychiatric/Substance Use:  - neg psychiatric ROS     Infectious Disease:       Malignancy:   (+) Malignancy, History of Other.Other CA bladder Remission status post.    Other:  - neg other ROS          Physical Exam    Airway        Mallampati: I   TM distance: > 3 FB   Neck ROM: full   Mouth opening: > 3 cm    Respiratory Devices and Support         Dental  no notable dental history         Cardiovascular   cardiovascular exam normal          Pulmonary   pulmonary exam normal                OUTSIDE LABS:  CBC:   Lab Results   Component Value Date    WBC 13.0 (H) 11/14/2024    WBC 16.4 (H) 09/20/2024    HGB 12.6 (L) 11/14/2024    HGB 8.4 (L) 09/20/2024    HCT 38.7 (L) 11/14/2024    HCT 26.5 (L) 09/20/2024     11/14/2024     09/20/2024     BMP:   Lab Results   Component Value Date     11/14/2024     09/19/2024    POTASSIUM 5.1 11/14/2024    POTASSIUM 4.2 09/19/2024    CHLORIDE 110 (H) 11/14/2024    CHLORIDE 108 (H) 09/19/2024    CO2 22 11/14/2024    CO2 22 09/19/2024    BUN 36.4 (H) 11/14/2024    BUN 29.4 (H) 09/19/2024    CR 1.74 (H) 11/14/2024    CR 1.60 (H) 09/19/2024     (H) 11/14/2024     (H) 09/19/2024     COAGS:   Lab Results   Component Value Date    INR 2.27 (H) 11/14/2024     POC: No results found for: \"BGM\", \"HCG\", \"HCGS\"  HEPATIC:   Lab Results   Component Value Date    ALBUMIN 3.9 09/09/2024    PROTTOTAL 6.7 09/09/2024    ALT 26 09/09/2024    AST 25 09/09/2024    ALKPHOS 116 09/09/2024    BILITOTAL 0.6 09/09/2024     OTHER:   Lab Results   Component Value Date    LACT 1.2 09/10/2024    A1C 5.2 09/09/2024    DAVID 10.1 11/14/2024    MAG 2.3 09/16/2024    SED " "9 11/14/2024       Anesthesia Plan    ASA Status:  2    NPO Status:  NPO Appropriate    Anesthesia Type: MAC.              Consents    Anesthesia Plan(s) and associated risks, benefits, and realistic alternatives discussed. Questions answered and patient/representative(s) expressed understanding.     - Discussed:     - Discussed with:  Patient            Postoperative Care    Pain management: IV analgesics, Oral pain medications, Multi-modal analgesia.   PONV prophylaxis: Ondansetron (or other 5HT-3), Dexamethasone or Solumedrol     Comments:               Lane Carcamo MD    I have reviewed the pertinent notes and labs in the chart from the past 30 days and (re)examined the patient.  Any updates or changes from those notes are reflected in this note.            # Drug Induced Coagulation Defect: home medication list includes an anticoagulant medication              # Overweight: Estimated body mass index is 25.76 kg/m  as calculated from the following:    Height as of 11/14/24: 1.88 m (6' 2\").    Weight as of this encounter: 91 kg (200 lb 9.6 oz).             "

## 2024-12-12 NOTE — PROVIDER NOTIFICATION
12/12/24 0834   ECG   ECG Rhythm Sinus bradycardia;First degree AV block     Dr. Carcamo notified of 12 lead EKG results. No orders received.

## 2024-12-12 NOTE — OP NOTE
PREOPERATIVE DIAGNOSES:     1.  Right foot second digit osteomyelitis   2.  Right foot, concern for first metatarsal osteomyelitis          POSTOPERATIVE DIAGNOSES:   1.  Right foot second digit osteomyelitis   2.  Right foot, concern for first metatarsal osteomyelitis    .        PROCEDURE:     1.  Right foot second digit amputation  2.  Right foot first metatarsal bone biopsy and culture   3.        ANESTHESIA:  MAC with local.       HEMOSTASIS:  Electrocautery.       ESTIMATED BLOOD LOSS:  15 mL.       SPECIMENS:  Soft tissue culture and pathology from second toe, bone biopsy and culture from first metatarsal       MATERIALS:  None    Indications: Gregory Zhong  has an ulcer and active infection to the right foot. He recently had a fall with a deep laceration to his second toe, resulting in a dorsal dislocation. Site has developed osteomyelitis to the site and recommendation was made for an amputation. Patient and his son agreed to this. The MRI also shows possible osteomyelitis to the first metatarsal. Clinically there's no cellulitis, open lesions or swelling here. There's a low suspicion for true infection. Given this, recommendation was made for a debridement and bone biopsy. Patient and his son agree to this plan.  Procedure was discussed with patient at length, including all risks and benefits. Patient agrees to planned procedure.     Procedure: Under mild sedation pt was brought into the OR & placed on the operating table in a supine position. A total of 20 cc of .5% marcaine were injected into the first met in a sampson block formation and base of the second toe.  The foot was scrubbed, prepped and draped in an aseptic manner.  A racquet-shaped incision with slightly more plantar skin than dorsal skin was made to the dorsal forefoot @ the level of the 2nd MPJ. The incision was started dorsally and carried on plantarly to fully encompass the digit.  The incision was deepened through subcutaneous tissue with  care being taken to identify and retract all vital neural and vascular structures. A towel clamp was attached to the distal toe to assist in the removal of the phalanges from the metatarsal. All bleeders were ligated and cauterized as necessary.      Sharp and blunt dissection were used to extend the incision to the level of capsule of the MPJ  ligaments. The towel clamp was used for leverage and then a 15 blade was used to detach the capsule along the MPJ. Once phalanges were successfully removed, the flexor and extensor tendons were removed under tension to insure that no stumps form.      All resected bone was sent to pathology.  All nonviable soft tissue was resected  insuring no necrotic tissue proximal to the amputation remained. Next copious amounts of saline were used to flush the amputation site. Site was then closed with  3.0 prolene suture.      Next an incision was made over the first metatarsal and carried down to bone. There was no deshaun purulence or signs of infection. An 11 korina leelock needle was used to withdraw a piece of bone. This was sent for pathology and culture. The site was then irrigated and closed with 3.0 prolene.    Upon completion of suturing, a non-adhesive sterile dressing was then placed over the surgical site followed by 4 x 4s, kerlix, & an ACE wrap.     The pt tolerated the procedure & anesthesia well.  He was transferred to the recovery room with vital signs stable and vascular status intact to the righ foot.  Following a period of post-op monitoring the pt will return to his hospital bed with the following written and oral post-op instructions:    Keep dressing clean, dry and intact.  Do not remove dressing.  WB to heel of RLE  Elevate foot at rest.  Contact Dr. Workman if any post-op questions or arrise.     Intraop findings: No significant signs of infection to first metatarsal. Second digit site had weakened, nonviable bone.

## 2024-12-12 NOTE — DISCHARGE INSTRUCTIONS
DR. JOSEMANUEL PALOMINO, DPM    Podiatry Triage Phone Number:  [phone], choose option #1 and then option #1 for orthopedic scheduling. They will be able to connect you to the care team.    Patient Care Nurses On Call (After Hours):  [phone]

## 2024-12-12 NOTE — PROGRESS NOTES
"Received telephone call from patient's assisted living facility.  Spoke with DIANA Cotton who stated they were uncomfortable with patient return, citing \"it is not considered safe with patient's delirium, hallucinations and dementia and fall risk.\"  Conveyed to RN that this is an outpatient surgery and that if needed, patient's son Vikram would stay with [facility] for the first 24 hours after procedure.    RN stated she would have her .  Azul called from [facility].  Reviewed discharge instructions via telephone with her.  Manager stated they were under the impression it was an inpatient procedure.  Stated, this is an outpatient procedure and reiterated discharge instructions and son's willingness to stay with patient.  Azul stated they will accept patient and she spoke with Vikram as well.    Instructions for facility were given to Arslan as well as antibiotic.   "

## 2024-12-12 NOTE — ANESTHESIA POSTPROCEDURE EVALUATION
Patient: Gregory Zhong    Procedure: Procedure(s):  Right foot second digit amputation,  right foot debridement       Anesthesia Type:  MAC    Note:     Postop Pain Control: Uneventful            Sign Out: Well controlled pain   PONV: No   Neuro/Psych: Uneventful            Sign Out: Acceptable/Baseline neuro status   Airway/Respiratory: Uneventful            Sign Out: Acceptable/Baseline resp. status   CV/Hemodynamics: Uneventful            Sign Out: Acceptable CV status   Other NRE: NONE   DID A NON-ROUTINE EVENT OCCUR? No           Last vitals:  Vitals Value Taken Time   /104 12/12/24 1215   Temp 95.54  F (35.3  C) 12/12/24 1159   Pulse 71 12/12/24 1215   Resp 15 12/12/24 1115   SpO2 100 % 12/12/24 1222   Vitals shown include unfiled device data.    Electronically Signed By: Lane Carcamo MD  December 12, 2024  12:23 PM

## 2024-12-12 NOTE — ANESTHESIA CARE TRANSFER NOTE
Patient: Gregory Zhong    Procedure: Procedure(s):  Right foot second digit amputation,  right foot debridement       Diagnosis: Other acute osteomyelitis of right foot (H) [M86.171]  Diagnosis Additional Information: No value filed.    Anesthesia Type:   MAC     Note:    Oropharynx: oropharynx clear of all foreign objects and spontaneously breathing  Level of Consciousness: awake  Oxygen Supplementation: room air    Independent Airway: airway patency satisfactory and stable  Dentition: dentition unchanged  Vital Signs Stable: post-procedure vital signs reviewed and stable  Report to RN Given: handoff report given  Patient transferred to: Phase II  Comments: Patients meets criteria for phase 2 recovery. VSS. Report to RN  Handoff Report: Identifed the Patient, Identified the Reponsible Provider, Reviewed the pertinent medical history, Discussed the surgical course, Reviewed Intra-OP anesthesia mangement and issues during anesthesia, Set expectations for post-procedure period and Allowed opportunity for questions and acknowledgement of understanding      Vitals:  Vitals Value Taken Time   /81 12/12/24 1104   Temp 95.54  F (35.3  C) 12/12/24 1105   Pulse 51 12/12/24 1104   Resp     SpO2 100 % 12/12/24 1106   Vitals shown include unfiled device data.    Electronically Signed By: KARLA Yadav CRNA  December 12, 2024  11:07 AM

## 2024-12-14 LAB
BACTERIA BONE ANAEROBE+AEROBE CULT: ABNORMAL
BACTERIA BONE ANAEROBE+AEROBE CULT: ABNORMAL

## 2024-12-15 ENCOUNTER — DOCUMENTATION ONLY (OUTPATIENT)
Dept: OTHER | Facility: CLINIC | Age: 84
End: 2024-12-15
Payer: COMMERCIAL

## 2024-12-15 LAB
BACTERIA TISS BX CULT: ABNORMAL
BACTERIA TISS BX CULT: ABNORMAL

## 2024-12-15 NOTE — PROGRESS NOTES
Podiatry Progress Note - Update:   -Call received from patient's son regarding some concern for surgical site. States that patient is walking a lot and had some drainage from the amputation site.   -Recommended dressing change by assisted living and to continue to monitor. Assuming no further drainage, okay to follow up with myself on Wednesday.   -Was using surgical shoe, plans to change to CAM boot.   -Encouraged minimal activity and walking only for the bathroom. Patient with baseline dementia making following these instructions challenging.       Nedra Workman DPM

## 2024-12-17 LAB
PATH REPORT.COMMENTS IMP SPEC: NORMAL
PATH REPORT.COMMENTS IMP SPEC: NORMAL
PATH REPORT.FINAL DX SPEC: NORMAL
PATH REPORT.GROSS SPEC: NORMAL
PATH REPORT.MICROSCOPIC SPEC OTHER STN: NORMAL
PATH REPORT.RELEVANT HX SPEC: NORMAL
PHOTO IMAGE: NORMAL

## 2024-12-18 ENCOUNTER — MEDICAL CORRESPONDENCE (OUTPATIENT)
Dept: HEALTH INFORMATION MANAGEMENT | Facility: CLINIC | Age: 84
End: 2024-12-18

## 2024-12-18 ENCOUNTER — ANESTHESIA EVENT (OUTPATIENT)
Dept: SURGERY | Facility: CLINIC | Age: 84
End: 2024-12-18
Payer: COMMERCIAL

## 2024-12-18 ENCOUNTER — OFFICE VISIT (OUTPATIENT)
Dept: PODIATRY | Facility: CLINIC | Age: 84
End: 2024-12-18
Payer: COMMERCIAL

## 2024-12-18 VITALS
DIASTOLIC BLOOD PRESSURE: 62 MMHG | WEIGHT: 200.62 LBS | SYSTOLIC BLOOD PRESSURE: 149 MMHG | BODY MASS INDEX: 25.75 KG/M2 | HEIGHT: 74 IN

## 2024-12-18 DIAGNOSIS — S90.31XA HEMATOMA OF RIGHT FOOT: Primary | ICD-10-CM

## 2024-12-18 DIAGNOSIS — Z89.421 STATUS POST AMPUTATION OF LESSER TOE OF RIGHT FOOT (H): ICD-10-CM

## 2024-12-18 DIAGNOSIS — M86.9 OSTEOMYELITIS OF SECOND TOE OF RIGHT FOOT (H): ICD-10-CM

## 2024-12-18 RX ORDER — ALENDRONATE SODIUM 70 MG/1
70 TABLET ORAL
COMMUNITY

## 2024-12-18 ASSESSMENT — ENCOUNTER SYMPTOMS: DYSRHYTHMIAS: 1

## 2024-12-18 NOTE — ANESTHESIA PREPROCEDURE EVALUATION
Anesthesia Pre-Procedure Evaluation    Patient: Gregory Zhong   MRN: [id number] : 1940        Procedure : Procedure(s):  Right foot debridement          Past Medical History:   Diagnosis Date    Acute hematogenous osteomyelitis of right foot (H)     Anemia     Atrial flutter (H)     Bladder cancer (H)     Cellulitis of leg     Cervicalgia     left arm radiculopathy starting 2012    Choledochocele     with sludge; stable    Chronic atrial fibrillation (H)     Chronic kidney disease, stage III (moderate) (H)     Coronary artery disease     Dyslipidemia     Gastroesophageal reflux disease     Hearing loss     Hereditary and idiopathic peripheral neuropathy     History of basal cell carcinoma     HLD (hyperlipidemia)     Hyperparathyroidism (H)     Hypertension     Impotence of organic origin     Macular degeneration of right eye     MCI (mild cognitive impairment)     Mild cognitive impairment     Multiple gallstones     Pre-syncope     Prostate cancer (H)     Sinus bradycardia     Tremor     Tremor       Past Surgical History:   Procedure Laterality Date    AMPUTATE TOE(S) Right 2024    Procedure: Right foot partial first ray resection, right foot partial third digit amputation, right foot excisional debridement down to and including tendon, site measuring 7 cm x 4 cm x 2 cm;  Surgeon: Nedra Workman DPM;  Location: [facility] OR    AMPUTATE TOE(S) Right 2024    Procedure: Right foot second digit amputation;  Surgeon: Nedra Workman DPM;  Location: [facility] OR    APPENDECTOMY      BIOPSY BONE FOOT Right 2024    Procedure: Right foot first metatarsal bone biopsy and culture;  Surgeon: Nedra Workman DPM;  Location: [facility] OR    Cryoablation of left and right renal masses      CYSTOSCOPY      Excision of basal cell carcinoma      IRRIGATION AND DEBRIDEMENT FOOT, COMBINED Right 2024    Procedure: Excisional debridement of necrotic tissue right foot;  Surgeon: Catarina Almendarez DPM,  Podiatry/Foot and Ankle Surgery;  Location: [facility] OR    MOHS MICROGRAPHIC PROCEDURE      TONSILLECTOMY        No Known Allergies   Social History     Tobacco Use    Smoking status: Never    Smokeless tobacco: Never   Substance Use Topics    Alcohol use: Yes     Comment: 1 drink per week with dinner      Wt Readings from Last 1 Encounters:   12/18/24 91 kg (200 lb 9.9 oz)        Anesthesia Evaluation   Pt has had prior anesthetic. Type: MAC and General.    No history of anesthetic complications       ROS/MED HX  ENT/Pulmonary:       Neurologic:     (+)   dementia,                             Cardiovascular:     (+) Dyslipidemia hypertension- Peripheral Vascular Disease-- Other.  CAD -  - -                        dysrhythmias, a-fib,             METS/Exercise Tolerance:     Hematologic:       Musculoskeletal: Comment: Prior toe amputations      GI/Hepatic:     (+) GERD, Asymptomatic on medication,                  Renal/Genitourinary:     (+) renal disease, type: CRI,            Endo:     (+)          thyroid problem (Hyperparathyroid), hypothyroidism,           Psychiatric/Substance Use:       Infectious Disease:       Malignancy:   (+) Malignancy, History of Prostate and Other.Prostate CA Remission status post.  Other CA Bladder Remission status post.    Other:            Physical Exam    Airway        Mallampati: I   TM distance: > 3 FB   Neck ROM: full   Mouth opening: > 3 cm    Respiratory Devices and Support         Dental           Cardiovascular   cardiovascular exam normal          Pulmonary   pulmonary exam normal                OUTSIDE LABS:  CBC:   Lab Results   Component Value Date    WBC 13.0 (H) 11/14/2024    WBC 16.4 (H) 09/20/2024    HGB 12.6 (L) 11/14/2024    HGB 8.4 (L) 09/20/2024    HCT 38.7 (L) 11/14/2024    HCT 26.5 (L) 09/20/2024     11/14/2024     09/20/2024     BMP:   Lab Results   Component Value Date     11/14/2024     09/19/2024    POTASSIUM 5.1 11/14/2024     "POTASSIUM 4.2 09/19/2024    CHLORIDE 110 (H) 11/14/2024    CHLORIDE 108 (H) 09/19/2024    CO2 22 11/14/2024    CO2 22 09/19/2024    BUN 36.4 (H) 11/14/2024    BUN 29.4 (H) 09/19/2024    CR 1.74 (H) 11/14/2024    CR 1.60 (H) 09/19/2024     (H) 11/14/2024     (H) 09/19/2024     COAGS:   Lab Results   Component Value Date    INR 2.27 (H) 11/14/2024     POC: No results found for: \"BGM\", \"HCG\", \"HCGS\"  HEPATIC:   Lab Results   Component Value Date    ALBUMIN 3.9 09/09/2024    PROTTOTAL 6.7 09/09/2024    ALT 26 09/09/2024    AST 25 09/09/2024    ALKPHOS 116 09/09/2024    BILITOTAL 0.6 09/09/2024     OTHER:   Lab Results   Component Value Date    LACT 1.2 09/10/2024    A1C 5.2 09/09/2024    DAVID 10.1 11/14/2024    MAG 2.3 09/16/2024    SED 9 11/14/2024       Anesthesia Plan    ASA Status:  3    NPO Status:  NPO Appropriate    Anesthesia Type: MAC.     - Reason for MAC: immobility needed, straight local not clinically adequate              Consents    Anesthesia Plan(s) and associated risks, benefits, and realistic alternatives discussed. Questions answered and patient/representative(s) expressed understanding.     - Discussed:     - Discussed with:  Patient            Postoperative Care       PONV prophylaxis: Ondansetron (or other 5HT-3), Background Propofol Infusion     Comments:               Elena Escalante MD    I have reviewed the pertinent notes and labs in the chart from the past 30 days and (re)examined the patient.  Any updates or changes from those notes are reflected in this note.                         # Overweight: Estimated body mass index is 25.76 kg/m  as calculated from the following:    Height as of 12/18/24: 1.88 m (6' 2\").    Weight as of 12/18/24: 91 kg (200 lb 9.9 oz).             "

## 2024-12-18 NOTE — PROGRESS NOTES
[place] PODIATRY/FOOT & ANKLE SURGERY  CLINIC NOTE    CHIEF COMPLAINT:  right foot    PATIENT HISTORY:  Gregory Zhong is a 84 year old male  who presents for follow up for his right foot. He had a second digit amputation on 12/12/24 following an open fracture with osteomyelitis to the site. Following return to his assisted living, things had been going well, but per patient's son, on Sunday, he had bleeding from his foot. States that he's unsure what happened. States that he had been walking. In general, the history is hard to obtain given patient's dementia and unclear documentation from the [facility]. Per patient's son, his INR was 1.9 this morning. He's been taking his coumadin. He denies pain. Denies N/F/V/C/D.        Review of Systems:  A 10 point review of systems was performed and is positive for that noted above in the patient history.  All other areas are negative.     PAST MEDICAL HISTORY:   Past Medical History:   Diagnosis Date    Atrial flutter (H)     Bladder cancer (H)     Chronic atrial fibrillation (H)     Chronic kidney disease, stage III (moderate) (H)     Dyslipidemia     Gastroesophageal reflux disease     History of basal cell carcinoma     HLD (hyperlipidemia)     Hyperparathyroidism (H)     Hypertension     Mild cognitive impairment     Prostate cancer (H)     Tremor         PAST SURGICAL HISTORY:   Past Surgical History:   Procedure Laterality Date    AMPUTATE TOE(S) Right 9/12/2024    Procedure: Right foot partial first ray resection, right foot partial third digit amputation, right foot excisional debridement down to and including tendon, site measuring 7 cm x 4 cm x 2 cm;  Surgeon: Nedra Workman DPM;  Location: [facility] OR    AMPUTATE TOE(S) Right 12/12/2024    Procedure: Right foot second digit amputation;  Surgeon: Nedra Workman DPM;  Location: [facility] OR    APPENDECTOMY      BIOPSY BONE FOOT Right 12/12/2024    Procedure: Right foot first metatarsal bone biopsy and culture;   Surgeon: Nedra Workman DPM;  Location: [facility] OR    Cryoablation of left and right renal masses      CYSTOSCOPY      Excision of basal cell carcinoma      IRRIGATION AND DEBRIDEMENT FOOT, COMBINED Right 9/14/2024    Procedure: Excisional debridement of necrotic tissue right foot;  Surgeon: Catarina Almendarez DPM, Podiatry/Foot and Ankle Surgery;  Location: [facility] OR    MOHS MICROGRAPHIC PROCEDURE      TONSILLECTOMY          MEDICATIONS:  Reviewed in Epic.     ALLERGIES:  No Known Allergies     SOCIAL HISTORY:   Social History     Socioeconomic History    Marital status:      Spouse name: Not on file    Number of children: Not on file    Years of education: Not on file    Highest education level: Not on file   Occupational History    Not on file   Tobacco Use    Smoking status: Never    Smokeless tobacco: Never   Substance and Sexual Activity    Alcohol use: Not on file    Drug use: Not on file    Sexual activity: Not on file   Other Topics Concern    Not on file   Social History Narrative    Not on file     Social Drivers of Health     Financial Resource Strain: Low Risk  (9/10/2024)    Financial Resource Strain     Within the past 12 months, have you or your family members you live with been unable to get utilities (heat, electricity) when it was really needed?: No   Food Insecurity: Low Risk  (9/10/2024)    Food Insecurity     Within the past 12 months, did you worry that your food would run out before you got money to buy more?: No     Within the past 12 months, did the food you bought just not last and you didn t have money to get more?: No   Transportation Needs: Low Risk  (9/10/2024)    Transportation Needs     Within the past 12 months, has lack of transportation kept you from medical appointments, getting your medicines, non-medical meetings or appointments, work, or from getting things that you need?: No   Physical Activity: Not on file   Stress: Not on file   Social Connections: Not on file  "  Interpersonal Safety: Low Risk  (12/12/2024)    Interpersonal Safety     Do you feel physically and emotionally safe where you currently live?: Yes     Within the past 12 months, have you been hit, slapped, kicked or otherwise physically hurt by someone?: No     Within the past 12 months, have you been humiliated or emotionally abused in other ways by your partner or ex-partner?: No   Housing Stability: Low Risk  (9/10/2024)    Housing Stability     Do you have housing? : Yes     Are you worried about losing your housing?: No        FAMILY HISTORY: No family history on file.     EXAM:Vitals: BP (!) 149/62   Ht 1.88 m (6' 2\")   Wt 91 kg (200 lb 9.9 oz)   BMI 25.76 kg/m    BMI= Body mass index is 25.76 kg/m .      General appearance: Patient is alert and fully cooperative with history & exam.  No sign of distress is noted during the visit.      Respiratory: Breathing is regular & unlabored while sitting.      HEENT: Hearing is intact to spoken word.  Speech is clear.  No gross evidence of visual impairment that would impact ambulation.      Dermatologic: Right foot: second digit site with hematoma and full incisional dehiscence. Time spent debriding site of hematoma. No cellulitis. Probes close to second metatarsal bone. Metatarsal biopsy site intact.     Vascular: Dorsalis pedis and posterior tibial pulses are intact & regular bilaterally.  CFT and skin temperature is normal to both lower extremities.      Neurologic: Lower extremity sensation is diminished, bilateral foot, to light touch.  No evidence of neurological-based weakness or contracture in the lower extremities.       Musculoskeletal: Patient is ambulatory without an assistive device or brace.  S/p partial first ray, second toe amputation and partial third digit amputation.    Psychiatric: Affect is pleasant & appropriate.      Toe, Right; Bone Biopsy   0 Result Notes  Culture 1+ Corynebacterium striatum Abnormal    Susceptibilities not routinely " done, refer to antibiogram to view typical susceptibility profiles   1+ Gram positive bacilli, resembling diphtheroids Abnormal    No further identification  Susceptibilities not routinely done, refer to antibiogram to view typical susceptibility profiles        Resulting Agency: [facility]     Toe, Right; Bone Biopsy   0 Result Notes  Culture Isolated in broth only Finegoldia magna Abnormal    On day 3 of incubation  Susceptibilities not routinely done, refer to antibiogram to view typical susceptibility profiles        Toe, Right; Tissue   0 Result Notes  Culture 1+ Corynebacterium striatum Abnormal    Susceptibilities not routinely done, refer to antibiogram to view typical susceptibility profiles   1+ Enterococcus faecalis Abnormal         Resulting Agency: [facility]     Susceptibility       Enterococcus faecalis     MAGGY     Ampicillin <=2 ug/mL Susceptible     Gentamicin Synergy Susceptible... Susceptible 1     Vancomycin 1 ug/mL Susceptible                Final Diagnosis   A.  Toe, right second, resection-  Acute inflammatory exudate involving skin with associated chronic osteomyelitis     B.  Bone, right first metatarsal, biopsy-  Nonneoplastic bone, no evidence of osteomyelitis or malignancy       IMAGING:   MRI 12/4  IMPRESSION:  1.  Soft tissue ulceration plantar aspect of the second toe base with extensive ill-defined soft tissue edema throughout the forefoot. No well-defined fluid collection or abscess.     2.  Complete dorsal dislocation of the second toe at the PIP joint. Findings of osteomyelitis involving the second toe proximal phalanx. Patchy marrow edema throughout the second middle and distal phalanges which may reflect changes of reactive osteitis   or early osteomyelitis.     3.  Amputation of the first ray at the distal first metatarsal shaft with findings of osteomyelitis involving the mid - distal residual first metatarsal.    I personally reviewed the images and agree with the reports as  stated.    PROCEDURE:   Verbal consent was obtained for debridement. A 15 blade was used to excise the nonivable tissue to the ulcer, down to and including subcutaneous tissue, on the right foot. No noted purulence afterwards. Large amount of hematoma. Debrided site measures 4 cm x 1 cm x 1cm cm. No probe to bone. Well tolerated. Site was cleansed with alcohol.       INR 12/18: 1.9    ASSESSMENT:  S/p right foot second toe amputation on 12/12 --> incisional dehiscence and hematoma      MEDICAL DECISION MAKING:   -Discussed all findings with patient. Chart and imaging reviewed.   -Time spent cleaning site and removing hematoma. Incision site with dehiscence requiring revision, with debridement and closure. Discussed with patient and his son.   -Overall challenging situation as it sounds that there's confusion with patient's care. Patient should be elevating his feet and not walking other than to pivot from his wheelchair to the bathroom, but doesn't sound that this is happening.   -Right foot without signs of infection, but given incision breakdown, should be debrided and sutured closed again. Will schedule this for tomorrow. Will order an INR prior to surgery to confirm value.   -Cont current antibiotics. Appropriate per previous intra-op cultures. First metatarsal without osteomyelitis.   -Hold coumadin now. Discussed at length with patient and son.       Nedra Workamn DPM   [place] Department of Podiatry/Foot & Ankle Surgery

## 2024-12-18 NOTE — PATIENT INSTRUCTIONS
Thank you for choosing [facility] Podiatry / Foot & Ankle Surgery!    DR. PALOMINO'S LOCATIONS  [facility] APPOINTMENTS: [phone] [address] TRIAGE NURSE: [phone]   [place] [address] RADIOLOGY: [phone]   FAX: [phone] BILLING QUESTIONS: [phone]    [facility] LINE:[phone]   [facility]    [facility] [id number]    [place] [address]    PH: [phone]    FAX: [phone]      Follow up:   -Right foot: continue antibiotics.   -Elevate foot at all times when not up and walking.   -WB to heel only of RLE. Only walk for essential walking (going to the bathroom.)   -Hold coumadin starting today, in anticipate for podiatry procedure.     -Change dressing if needed for drainage only. Cleanse site with wound cleanser. Pad dry. Paint site with betadine. Cover with gauze, gauze wrap, ABD pad and ACE bandage.

## 2024-12-19 ENCOUNTER — MEDICAL CORRESPONDENCE (OUTPATIENT)
Dept: HEALTH INFORMATION MANAGEMENT | Facility: CLINIC | Age: 84
End: 2024-12-19

## 2024-12-19 ENCOUNTER — ANESTHESIA (OUTPATIENT)
Dept: SURGERY | Facility: CLINIC | Age: 84
End: 2024-12-19
Payer: COMMERCIAL

## 2024-12-19 ENCOUNTER — HOSPITAL ENCOUNTER (OUTPATIENT)
Facility: CLINIC | Age: 84
Discharge: HOME | End: 2024-12-19
Attending: PODIATRIST | Admitting: PODIATRIST
Payer: COMMERCIAL

## 2024-12-19 VITALS
RESPIRATION RATE: 16 BRPM | WEIGHT: 198.6 LBS | HEIGHT: 74 IN | TEMPERATURE: 97 F | OXYGEN SATURATION: 97 % | DIASTOLIC BLOOD PRESSURE: 74 MMHG | SYSTOLIC BLOOD PRESSURE: 155 MMHG | BODY MASS INDEX: 25.49 KG/M2 | HEART RATE: 49 BPM

## 2024-12-19 DIAGNOSIS — L03.115 CELLULITIS OF RIGHT FOOT: ICD-10-CM

## 2024-12-19 LAB
BACTERIA BONE ANAEROBE+AEROBE CULT: ABNORMAL
BACTERIA TISS BX CULT: ABNORMAL
BACTERIA TISS BX CULT: ABNORMAL
INR PPP: 1.72 (ref 0.85–1.15)

## 2024-12-19 PROCEDURE — 258N000003 HC RX IP 258 OP 636: Performed by: NURSE ANESTHETIST, CERTIFIED REGISTERED

## 2024-12-19 PROCEDURE — 250N000011 HC RX IP 250 OP 636: Performed by: PODIATRIST

## 2024-12-19 PROCEDURE — 250N000009 HC RX 250: Performed by: PODIATRIST

## 2024-12-19 PROCEDURE — 11043 DBRDMT MUSC&/FSCA 1ST 20/<: CPT | Mod: 78 | Performed by: PODIATRIST

## 2024-12-19 PROCEDURE — 36415 COLL VENOUS BLD VENIPUNCTURE: CPT | Performed by: PODIATRIST

## 2024-12-19 PROCEDURE — 250N000011 HC RX IP 250 OP 636: Performed by: NURSE ANESTHETIST, CERTIFIED REGISTERED

## 2024-12-19 PROCEDURE — 710N000012 HC RECOVERY PHASE 2, PER MINUTE: Performed by: PODIATRIST

## 2024-12-19 PROCEDURE — 250N000009 HC RX 250: Performed by: NURSE ANESTHETIST, CERTIFIED REGISTERED

## 2024-12-19 PROCEDURE — 85610 PROTHROMBIN TIME: CPT | Performed by: PODIATRIST

## 2024-12-19 PROCEDURE — 250N000011 HC RX IP 250 OP 636: Mod: JW | Performed by: PODIATRIST

## 2024-12-19 PROCEDURE — 999N000141 HC STATISTIC PRE-PROCEDURE NURSING ASSESSMENT: Performed by: PODIATRIST

## 2024-12-19 PROCEDURE — 360N000085 HC SURGERY LEVEL 5 W/ FLUORO, PER MIN: Performed by: PODIATRIST

## 2024-12-19 PROCEDURE — 710N000009 HC RECOVERY PHASE 1, LEVEL 1, PER MIN: Performed by: PODIATRIST

## 2024-12-19 PROCEDURE — 370N000017 HC ANESTHESIA TECHNICAL FEE, PER MIN: Performed by: PODIATRIST

## 2024-12-19 PROCEDURE — 272N000001 HC OR GENERAL SUPPLY STERILE: Performed by: PODIATRIST

## 2024-12-19 RX ORDER — PROPOFOL 10 MG/ML
INJECTION, EMULSION INTRAVENOUS CONTINUOUS PRN
Status: DISCONTINUED | OUTPATIENT
Start: 2024-12-19 | End: 2024-12-19

## 2024-12-19 RX ORDER — SODIUM CHLORIDE, SODIUM LACTATE, POTASSIUM CHLORIDE, CALCIUM CHLORIDE 600; 310; 30; 20 MG/100ML; MG/100ML; MG/100ML; MG/100ML
INJECTION, SOLUTION INTRAVENOUS CONTINUOUS PRN
Status: DISCONTINUED | OUTPATIENT
Start: 2024-12-19 | End: 2024-12-19

## 2024-12-19 RX ORDER — BUPIVACAINE HYDROCHLORIDE 5 MG/ML
INJECTION, SOLUTION EPIDURAL; INTRACAUDAL PRN
Status: DISCONTINUED | OUTPATIENT
Start: 2024-12-19 | End: 2024-12-19 | Stop reason: HOSPADM

## 2024-12-19 RX ORDER — ONDANSETRON 2 MG/ML
INJECTION INTRAMUSCULAR; INTRAVENOUS PRN
Status: DISCONTINUED | OUTPATIENT
Start: 2024-12-19 | End: 2024-12-19

## 2024-12-19 RX ORDER — SODIUM CHLORIDE, SODIUM LACTATE, POTASSIUM CHLORIDE, CALCIUM CHLORIDE 600; 310; 30; 20 MG/100ML; MG/100ML; MG/100ML; MG/100ML
INJECTION, SOLUTION INTRAVENOUS CONTINUOUS
Status: DISCONTINUED | OUTPATIENT
Start: 2024-12-19 | End: 2024-12-19 | Stop reason: HOSPADM

## 2024-12-19 RX ORDER — DEXAMETHASONE SODIUM PHOSPHATE 4 MG/ML
4 INJECTION, SOLUTION INTRA-ARTICULAR; INTRALESIONAL; INTRAMUSCULAR; INTRAVENOUS; SOFT TISSUE
Status: DISCONTINUED | OUTPATIENT
Start: 2024-12-19 | End: 2024-12-19 | Stop reason: HOSPADM

## 2024-12-19 RX ORDER — HYDROMORPHONE HCL IN WATER/PF 6 MG/30 ML
0.2 PATIENT CONTROLLED ANALGESIA SYRINGE INTRAVENOUS EVERY 5 MIN PRN
Status: DISCONTINUED | OUTPATIENT
Start: 2024-12-19 | End: 2024-12-19 | Stop reason: HOSPADM

## 2024-12-19 RX ORDER — CEFAZOLIN SODIUM/WATER 2 G/20 ML
2 SYRINGE (ML) INTRAVENOUS
Status: COMPLETED | OUTPATIENT
Start: 2024-12-19 | End: 2024-12-19

## 2024-12-19 RX ORDER — ONDANSETRON 2 MG/ML
4 INJECTION INTRAMUSCULAR; INTRAVENOUS EVERY 30 MIN PRN
Status: DISCONTINUED | OUTPATIENT
Start: 2024-12-19 | End: 2024-12-19 | Stop reason: HOSPADM

## 2024-12-19 RX ORDER — CEFAZOLIN SODIUM/WATER 2 G/20 ML
2 SYRINGE (ML) INTRAVENOUS SEE ADMIN INSTRUCTIONS
Status: DISCONTINUED | OUTPATIENT
Start: 2024-12-19 | End: 2024-12-19 | Stop reason: HOSPADM

## 2024-12-19 RX ORDER — FENTANYL CITRATE 50 UG/ML
25 INJECTION, SOLUTION INTRAMUSCULAR; INTRAVENOUS EVERY 5 MIN PRN
Status: DISCONTINUED | OUTPATIENT
Start: 2024-12-19 | End: 2024-12-19 | Stop reason: HOSPADM

## 2024-12-19 RX ORDER — HYDROMORPHONE HCL IN WATER/PF 6 MG/30 ML
0.4 PATIENT CONTROLLED ANALGESIA SYRINGE INTRAVENOUS EVERY 5 MIN PRN
Status: DISCONTINUED | OUTPATIENT
Start: 2024-12-19 | End: 2024-12-19 | Stop reason: HOSPADM

## 2024-12-19 RX ORDER — CEPHALEXIN 500 MG/1
500 CAPSULE ORAL 2 TIMES DAILY
Qty: 10 CAPSULE | Refills: 0 | Status: SHIPPED | OUTPATIENT
Start: 2024-12-19 | End: 2024-12-24

## 2024-12-19 RX ORDER — BUPIVACAINE HYDROCHLORIDE 5 MG/ML
INJECTION, SOLUTION EPIDURAL; INTRACAUDAL
Status: DISCONTINUED
Start: 2024-12-19 | End: 2024-12-19 | Stop reason: HOSPADM

## 2024-12-19 RX ORDER — ONDANSETRON 4 MG/1
4 TABLET, ORALLY DISINTEGRATING ORAL EVERY 30 MIN PRN
Status: DISCONTINUED | OUTPATIENT
Start: 2024-12-19 | End: 2024-12-19 | Stop reason: HOSPADM

## 2024-12-19 RX ORDER — FENTANYL CITRATE 50 UG/ML
50 INJECTION, SOLUTION INTRAMUSCULAR; INTRAVENOUS EVERY 5 MIN PRN
Status: DISCONTINUED | OUTPATIENT
Start: 2024-12-19 | End: 2024-12-19 | Stop reason: HOSPADM

## 2024-12-19 RX ORDER — LIDOCAINE HYDROCHLORIDE 20 MG/ML
INJECTION, SOLUTION INFILTRATION; PERINEURAL PRN
Status: DISCONTINUED | OUTPATIENT
Start: 2024-12-19 | End: 2024-12-19

## 2024-12-19 RX ORDER — NALOXONE HYDROCHLORIDE 0.4 MG/ML
0.1 INJECTION, SOLUTION INTRAMUSCULAR; INTRAVENOUS; SUBCUTANEOUS
Status: DISCONTINUED | OUTPATIENT
Start: 2024-12-19 | End: 2024-12-19 | Stop reason: HOSPADM

## 2024-12-19 RX ADMIN — ONDANSETRON 4 MG: 2 INJECTION INTRAMUSCULAR; INTRAVENOUS at 15:00

## 2024-12-19 RX ADMIN — PROPOFOL 50 MCG/KG/MIN: 10 INJECTION, EMULSION INTRAVENOUS at 14:29

## 2024-12-19 RX ADMIN — Medication 2 G: at 14:19

## 2024-12-19 RX ADMIN — SODIUM CHLORIDE, POTASSIUM CHLORIDE, SODIUM LACTATE AND CALCIUM CHLORIDE: 600; 310; 30; 20 INJECTION, SOLUTION INTRAVENOUS at 13:52

## 2024-12-19 RX ADMIN — LIDOCAINE HYDROCHLORIDE 40 MG: 20 INJECTION, SOLUTION INFILTRATION; PERINEURAL at 14:29

## 2024-12-19 ASSESSMENT — ACTIVITIES OF DAILY LIVING (ADL)
ADLS_ACUITY_SCORE: 59
ADLS_ACUITY_SCORE: 59
ADLS_ACUITY_SCORE: 63
ADLS_ACUITY_SCORE: 57
ADLS_ACUITY_SCORE: 59

## 2024-12-19 NOTE — OP NOTE
PREOPERATIVE DIAGNOSES:     1.  Right foot hematoma s/p second digit amputation           POSTOPERATIVE DIAGNOSES:   1.  Right foot hematoma s/p second digit amputation      PROCEDURE:     1. Right foot excisional debridement down to and including tendon, site measuring 5 cm x 3 cm x 3 cm       ANESTHESIA:        HEMOSTASIS:  Electrocautery.       ESTIMATED BLOOD LOSS:  5 mL.       SPECIMENS:        MATERIALS: Surgifoam and thrombin     Indications: Patient recently had a second digit amputation as treatment for osteomyelitis. Following procedure, he developed a hematoma, likely related to patient's coumadin use. He also reportedly walks more than is recommended, likely related to patient's baseline dementia. Per patient's son, there's been several times that he's been found out in the hallway walking in the middle of the night, rather than in bed. Given the incisional dehiscence and hematoma, recommendation was made for debridement of the incision with closure. Procedure was discussed with patient at length, including all risks and benefits. Patient agrees to planned procedure.      Procedure: Under mild sedation pt was brought into the OR & placed on the operating table in a supine position. The foot was scrubbed, prepped and draped in an aseptic manner. Following this, the site was sharply debrided using a 15 blade and a . The medial skin edges were excised and debrided to healthy bleeding skin. Time was spent debriding the incision wound bed of hematoma. Site was debrided down to and including tendon, using a rongeur and a 15 blade. There was no deshaun purulence or signs of gross infection. There was no significant arterial bleeders during the procedure.     Following this, the site was irrigated with copious amounts of saline. The site was packed with surgifoam and thrombin, given the history of hematoma. There were no bleeders though that needed electrocautery use. The site was then closed with 2-0 and  3-0 prolene.     The pt tolerated the procedure & anesthesia well.  He was transferred to the recovery room with vital signs stable and vascular status intact to the right foot.  Following a period of post-op monitoring the pt will return to his hospital bed with the following written and oral post-op instructions:     Keep dressing clean, dry and intact.  Do not remove dressing.  Elevate foot at rest.  Contact Dr. Workman if any post-op questions or arrise.      Intraop findings: No significant signs of infection. Noted hemtaoma excised. Devitalized tissue debrided to fascia/capsule.      Post op plan: Provided instructions for patient's TCU. Should be heel WB, with use of wheelchair as much as possible and heel use for transfers. Can restart coumadin on 12/22. Order placed for dressing change on 12/23.

## 2024-12-19 NOTE — ANESTHESIA POSTPROCEDURE EVALUATION
Patient: Gregory Zhong    Procedure: Procedure(s):  Right foot debridement       Anesthesia Type:  MAC    Note:  Disposition: Inpatient   Postop Pain Control: Uneventful            Sign Out: Well controlled pain   PONV: No   Neuro/Psych: Uneventful            Sign Out: Acceptable/Baseline neuro status   Airway/Respiratory: Uneventful            Sign Out: Acceptable/Baseline resp. status   CV/Hemodynamics: Uneventful            Sign Out: Acceptable CV status; No obvious hypovolemia; No obvious fluid overload   Other NRE: NONE   DID A NON-ROUTINE EVENT OCCUR? No           Last vitals:  Vitals Value Taken Time   /74 12/19/24 1600   Temp     Pulse 52 12/19/24 1613   Resp 21 12/19/24 1613   SpO2 96 % 12/19/24 1613   Vitals shown include unfiled device data.    Electronically Signed By: BRITNEY THOMPSON MD  December 19, 2024  4:21 PM

## 2024-12-19 NOTE — ANESTHESIA CARE TRANSFER NOTE
Patient: Gregory Zhong    Procedure: Procedure(s):  Right foot debridement       Diagnosis: Hematoma of right foot [S90.31XA]  Diagnosis Additional Information: No value filed.    Anesthesia Type:   MAC     Note:    Oropharynx: oropharynx clear of all foreign objects and spontaneously breathing  Level of Consciousness: awake  Oxygen Supplementation: face mask  Level of Supplemental Oxygen (L/min / FiO2): 6  Independent Airway: airway patency satisfactory and stable  Dentition: dentition unchanged  Vital Signs Stable: post-procedure vital signs reviewed and stable  Report to RN Given: handoff report given  Patient transferred to: PACU  Comments: Pt to PACU with O2 via mask, airway patent, VSS. Report to RN.  Handoff Report: Identifed the Patient, Identified the Reponsible Provider, Reviewed the pertinent medical history, Discussed the surgical course, Reviewed Intra-OP anesthesia mangement and issues during anesthesia, Set expectations for post-procedure period and Allowed opportunity for questions and acknowledgement of understanding  Vitals:  Vitals Value Taken Time   BP     Temp     Pulse 52 12/19/24 1526   Resp 12 12/19/24 1526   SpO2 100 % 12/19/24 1526   Vitals shown include unfiled device data.    Electronically Signed By: KARLA Khalil CRNA  December 19, 2024  3:28 PM

## 2024-12-19 NOTE — DISCHARGE INSTRUCTIONS
Same Day Surgery Discharge Instructions for  Sedation and General Anesthesia     It's not unusual to feel dizzy, light-headed or faint for up to 24 hours after surgery or while taking pain medication.  If you have these symptoms: sit for a few minutes before standing and have someone assist you when you get up to walk or use the bathroom.    You should rest and relax for the next 24 hours. We recommend you make arrangements to have an adult stay with you for at least 24 hours after your discharge.  Avoid hazardous and strenuous activity.    DO NOT DRIVE any vehicle or operate mechanical equipment for 24 hours following the end of your surgery.  Even though you may feel normal, your reactions may be affected by the medication you have received.    Do not drink alcoholic beverages for 24 hours following surgery.     Slowly progress to your regular diet as you feel able. It's not unusual to feel nauseated and/or vomit after receiving anesthesia.  If you develop these symptoms, drink clear liquids (apple juice, ginger ale, broth, 7-up, etc. ) until you feel better.  If your nausea and vomiting persists for 24 hours, please notify your surgeon.      All narcotic pain medications, along with inactivity and anesthesia, can cause constipation. Drinking plenty of liquids and increasing fiber intake will help.    For any questions of a medical nature, call your surgeon.    Do not make important decisions for 24 hours.    If you had general anesthesia, you may have a sore throat for a couple of days related to the breathing tube used during surgery.  You may use Cepacol lozenges to help with this discomfort.  If it worsens or if you develop a fever, contact your surgeon.     If you feel your pain is not well managed with the pain medications prescribed by your surgeon, please contact your surgeon's office to let them know so they can address your concerns.      **If you have questions or concerns about your procedure, call    Dr. Workman at *

## 2024-12-19 NOTE — PROGRESS NOTES
PTA medications completed by Medication Scribe day of surgery     Med[facility] history sources: [facility]  In the past week, patient estimated taking medication this percent of the time: Greater than 90%      Significant changes made to the medication list:  Patient reports no longer taking the following meds (med scribe removed from PTA med list): Remeron, Proamatine, Imdur      Additional medication history information:   None    Medication reconciliation completed by provider prior to medication history? No    Time spent in this activity: 30 minutes    The information provided in this note is only as accurate as the sources available at the time of update(s)      Prior to Admission medications    Medication Sig Last Dose Taking? Auth Provider Long Term End Date   alendronate (FOSAMAX) 70 MG tablet Take 70 mg by mouth every 7 days. 12/15/2024 at  6:30 AM Yes Reported, Patient No    cephALEXin (KEFLEX) 500 MG capsule Take 1 capsule (500 mg) by mouth 2 times daily for 6 days. 12/18/2024 at  8:00 AM Yes Nedra Workman DPM  12/19/24   melatonin 3 MG tablet Take 3 mg by mouth at bedtime. 12/18/2024 at  8:00 PM Yes Reported, Patient     Multiple Vitamins-Minerals (PRESERVISION AREDS 2) CHEW Take 1 tablet by mouth 2 times daily. 12/19/2024 at  8:00 AM Yes Unknown, Entered By History     patiromer (VELTASSA) 8.4 g packet Take 8.4 g by mouth. on Mon Wed Fri at 1500 12/18/2024 at  3:00 PM Yes Reported, Patient     polyethylene glycol (MIRALAX) 17 GM/Dose powder Take 1 Capful by mouth daily. 12/19/2024 at  8:00 AM Yes Reported, Patient     vitamin D3 (CHOLECALCIFEROL) 50 mcg (2000 units) tablet Take 1 tablet by mouth daily. 12/19/2024 at  8:00 AM Yes Unknown, Entered By History     warfarin ANTICOAGULANT (COUMADIN) 5 MG tablet Take by mouth 7.5 mg every Mon, Wed, Fri; 5 mg all other days or as directed. Next INR due 12/13/24. Call [facility] [phone] with  questions. 12/17/2024 at  8:00 PM Yes Reported, Patient No    warfarin ANTICOAGULANT (COUMADIN) 7.5 MG tablet Take by mouth 7.5 mg every Mon, Wed, Fri; 5 mg all other days or as directed. Next INR due 12/13/24. Call [facility] [phone] with questions. 12/16/2024 at  8:00 PM Yes Reported, Patient No        Medication history completed by: Jose Navarro

## 2024-12-19 NOTE — OR NURSING
Meets criteria for discharge.  Discharge instructions reviewed with pt and pt's designated responsible party.  Pt label on prescription bag from pharmacy matched to pt's wristband. Pharmacy bag opened with 1 prescriptions inside. Medications were reviewed to match pt wristband while pt and significant other agreed with identification. Prescriptions placed back in pharmacy bag resealed with tape and manilla envelope in bag for tcu. Son transporting pt and envelope to tcu per pt request.

## 2024-12-26 ENCOUNTER — OFFICE VISIT (OUTPATIENT)
Dept: PODIATRY | Facility: CLINIC | Age: 84
End: 2024-12-26
Payer: COMMERCIAL

## 2024-12-26 DIAGNOSIS — Z89.421 STATUS POST AMPUTATION OF LESSER TOE OF RIGHT FOOT (H): ICD-10-CM

## 2024-12-26 DIAGNOSIS — T81.30XA WOUND DEHISCENCE: ICD-10-CM

## 2024-12-26 DIAGNOSIS — Z09 SURGERY FOLLOW-UP EXAMINATION: Primary | ICD-10-CM

## 2024-12-26 NOTE — PATIENT INSTRUCTIONS
Thank you for choosing [facility] Podiatry / Foot & Ankle Surgery!    DR. VERA'S CLINIC LOCATIONS:     [facility] TRIAGE LINE: [phone]   [address] [place] [address] APPOINTMENTS: [phone]   [place] [address] RADIOLOGY: [phone]   (Every other Tues - Wed - Fri PM) SET UP SURGERY: [phone]    PHYSICAL THERAPY: [phone]   [place] SPECIAL[facility] BILLING QUESTIONS: [phone]   [address] FAX: [phone]   [place] [address]    (Thurs & Fri AM)         [facility] LOCATIONS  [address]  [place] [address]  Phone: [phone]  Fax: [phone] [facility]   [address]  [place] [address]  Phone: [phone]  Fax: [phone]   [facility] [place]  [address]  [place] [address]  Phone: [phone]  Fax: [phone] [facility]  [address]  [place] [address]  Phone: [phone]   Fax: [phone]   * Please call any location listed to make an appointment for a casting/fitting. Your referral was sent to their central office and they will all have the order on file.      .mrtuc

## 2024-12-26 NOTE — LETTER
12/26/2024      Gregory Zhong  [address] [address]      Dear Colleague,    Thank you for referring your patient, Gregory Zhong, to the [facility] PODIATRY. Please see a copy of my visit note below.    ASSESSMENT:  Encounter Diagnoses   Name Primary?     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  No clinical signs of infection.  Incomplete healing at the dorsal foot, likely an area of previous hematoma described by Dr. Workman.  Sterile redress of the right foot.    Recommendations:  Continue surgical shoe  Daily wound cares: Gentle cleansing, blot dry, paint with Betadine and cover with dry gauze.  Encourage follow-up with Dr. Workman who is more familiar with the recent history and surgical procedures.    Disclaimer: This note consists of symbols derived from keyboarding, dictation and/or voice recognition software. As a result, there may be errors in the script that have gone undetected. Please consider this when interpreting information found in this chart.    Aman Vogel DPM, FACFAS, [facility] [place] Department of Podiatry/Foot & Ankle Surgery      ____________________________________________________________________    HPI:       Gregory follows up with his son after hospitalization from surgical procedures on 12/12/2024 and 12/19/2024.   12/12/2024: Right second toe amputation for treatment of osteomyelitis and right first metatarsal bone biopsy.  Bone biopsy was negative.  12/18/24: Examined in clinic and found to have incisional dehiscence and hematoma prompting return to the OR.  12/19/2024:  Right foot excisional debridement down to and including tendon, site measuring 5 cm x 3 cm x 3 cm   Gregory resides at a nursing home and has assistance with monitoring and wound cares.     Past Medical History:   Diagnosis Date      Acute hematogenous osteomyelitis of right foot (H)      Anemia      Atrial flutter (H)      Bladder cancer (H)      Cellulitis of leg      Cervicalgia     left arm radiculopathy starting July 2012     Choledochocele     with sludge; stable     Chronic atrial fibrillation (H)      Chronic kidney disease, stage III (moderate) (H)      Coronary artery disease      Dyslipidemia      Gastroesophageal reflux disease      Hearing loss      Hereditary and idiopathic peripheral neuropathy      History of basal cell carcinoma      HLD (hyperlipidemia)      Hyperparathyroidism (H)      Hypertension      Impotence of organic origin      Macular degeneration of right eye      MCI (mild cognitive impairment)      Mild cognitive impairment      Multiple gallstones      Pre-syncope      Prostate cancer (H)      Sinus bradycardia      Tremor      Tremor    *  *  Past Surgical History:   Procedure Laterality Date     AMPUTATE TOE(S) Right 9/12/2024    Procedure: Right foot partial first ray resection, right foot partial third digit amputation, right foot excisional debridement down to and including tendon, site measuring 7 cm x 4 cm x 2 cm;  Surgeon: Nedra Workman DPM;  Location: [facility] OR     AMPUTATE TOE(S) Right 12/12/2024    Procedure: Right foot second digit amputation;  Surgeon: Nedra Workman DPM;  Location: [facility] OR     APPENDECTOMY       BIOPSY BONE FOOT Right 12/12/2024    Procedure: Right foot first metatarsal bone biopsy and culture;  Surgeon: Nedra Workman DPM;  Location: [facility] OR     Cryoablation of left and right renal masses       CYSTOSCOPY       Excision of basal cell carcinoma       IRRIGATION AND DEBRIDEMENT FOOT, COMBINED Right 9/14/2024    Procedure: Excisional debridement of necrotic tissue right foot;  Surgeon: Catarina Almendarez DPM, Podiatry/Foot and Ankle Surgery;  Location: [facility] OR     IRRIGATION AND DEBRIDEMENT FOOT, COMBINED Right 12/19/2024    Procedure: Right foot debridement;  Surgeon: Jacinta  Nedra AYON DPM;  Location:  OR     MOHS MICROGRAPHIC PROCEDURE       TONSILLECTOMY     *  *  Current Outpatient Medications   Medication Sig Dispense Refill     alendronate (FOSAMAX) 70 MG tablet Take 70 mg by mouth every 7 days.       melatonin 3 MG tablet Take 3 mg by mouth at bedtime.       Multiple Vitamins-Minerals (PRESERVISION AREDS 2) CHEW Take 1 tablet by mouth 2 times daily.       patiromer (VELTASSA) 8.4 g packet Take 8.4 g by mouth. on Mon Wed Fri at 1500       polyethylene glycol (MIRALAX) 17 GM/Dose powder Take 1 Capful by mouth daily.       vitamin D3 (CHOLECALCIFEROL) 50 mcg (2000 units) tablet Take 1 tablet by mouth daily.       warfarin ANTICOAGULANT (COUMADIN) 5 MG tablet Take by mouth 7.5 mg every Mon, Wed, Fri; 5 mg all other days or as directed. Next INR due 12/13/24. Call [facility] [phone] with questions.       warfarin ANTICOAGULANT (COUMADIN) 7.5 MG tablet Take by mouth 7.5 mg every Mon, Wed, Fri; 5 mg all other days or as directed. Next INR due 12/13/24. Call [facility] [phone] with questions.           EXAM:    Vitals: There were no vitals taken for this visit.  BMI: There is no height or weight on file to calculate BMI.    Derm:    Residual sutures intact.  Biopsy site stable.  Dorsal tissue somewhat boggy with what appears to be a superficial wound.  No clinical signs of infection.  No deep probing.        Again, thank you for allowing me to participate in the care of your patient.        Sincerely,        Aman Vogel DPM    Electronically signed

## 2024-12-26 NOTE — PROGRESS NOTES
ASSESSMENT:  Encounter Diagnoses   Name Primary?    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  No clinical signs of infection.  Incomplete healing at the dorsal foot, likely an area of previous hematoma described by Dr. Workman.  Sterile redress of the right foot.    Recommendations:  Continue surgical shoe  Daily wound cares: Gentle cleansing, blot dry, paint with Betadine and cover with dry gauze.  Encourage follow-up with Dr. Workman who is more familiar with the recent history and surgical procedures.    Disclaimer: This note consists of symbols derived from keyboarding, dictation and/or voice recognition software. As a result, there may be errors in the script that have gone undetected. Please consider this when interpreting information found in this chart.    Aman Vogel DPM, FACFAS, MS    [place] Department of Podiatry/Foot & Ankle Surgery      ____________________________________________________________________    HPI:       Gregory follows up with his son after hospitalization from surgical procedures on 12/12/2024 and 12/19/2024.   12/12/2024: Right second toe amputation for treatment of osteomyelitis and right first metatarsal bone biopsy.  Bone biopsy was negative.  12/18/24: Examined in clinic and found to have incisional dehiscence and hematoma prompting return to the OR.  12/19/2024:  Right foot excisional debridement down to and including tendon, site measuring 5 cm x 3 cm x 3 cm   Gregory resides at a nursing home and has assistance with monitoring and wound cares.     Past Medical History:   Diagnosis Date    Acute hematogenous osteomyelitis of right foot (H)     Anemia     Atrial flutter (H)     Bladder cancer (H)     Cellulitis of leg     Cervicalgia     left arm radiculopathy starting July 2012    Choledochocele     with sludge; stable    Chronic atrial  fibrillation (H)     Chronic kidney disease, stage III (moderate) (H)     Coronary artery disease     Dyslipidemia     Gastroesophageal reflux disease     Hearing loss     Hereditary and idiopathic peripheral neuropathy     History of basal cell carcinoma     HLD (hyperlipidemia)     Hyperparathyroidism (H)     Hypertension     Impotence of organic origin     Macular degeneration of right eye     MCI (mild cognitive impairment)     Mild cognitive impairment     Multiple gallstones     Pre-syncope     Prostate cancer (H)     Sinus bradycardia     Tremor     Tremor    *  *  Past Surgical History:   Procedure Laterality Date    AMPUTATE TOE(S) Right 9/12/2024    Procedure: Right foot partial first ray resection, right foot partial third digit amputation, right foot excisional debridement down to and including tendon, site measuring 7 cm x 4 cm x 2 cm;  Surgeon: Nedra Workman DPM;  Location: [facility] OR    AMPUTATE TOE(S) Right 12/12/2024    Procedure: Right foot second digit amputation;  Surgeon: Nedra Workman DPM;  Location: [facility] OR    APPENDECTOMY      BIOPSY BONE FOOT Right 12/12/2024    Procedure: Right foot first metatarsal bone biopsy and culture;  Surgeon: Nedra Workman DPM;  Location: [facility] OR    Cryoablation of left and right renal masses      CYSTOSCOPY      Excision of basal cell carcinoma      IRRIGATION AND DEBRIDEMENT FOOT, COMBINED Right 9/14/2024    Procedure: Excisional debridement of necrotic tissue right foot;  Surgeon: Catarina Almendarez DPM, Podiatry/Foot and Ankle Surgery;  Location: [facility] OR    IRRIGATION AND DEBRIDEMENT FOOT, COMBINED Right 12/19/2024    Procedure: Right foot debridement;  Surgeon: Nedra Workman DPM;  Location:  OR    MOHS MICROGRAPHIC PROCEDURE      TONSILLECTOMY     *  *  Current Outpatient Medications   Medication Sig Dispense Refill    alendronate (FOSAMAX) 70 MG tablet Take 70 mg by mouth every 7 days.      melatonin 3 MG tablet Take 3 mg by mouth at bedtime.       Multiple Vitamins-Minerals (PRESERVISION AREDS 2) CHEW Take 1 tablet by mouth 2 times daily.      patiromer (VELTASSA) 8.4 g packet Take 8.4 g by mouth. on Mon Wed Fri at 1500      polyethylene glycol (MIRALAX) 17 GM/Dose powder Take 1 Capful by mouth daily.      vitamin D3 (CHOLECALCIFEROL) 50 mcg (2000 units) tablet Take 1 tablet by mouth daily.      warfarin ANTICOAGULANT (COUMADIN) 5 MG tablet Take by mouth 7.5 mg every Mon, Wed, Fri; 5 mg all other days or as directed. Next INR due 12/13/24. Call [facility] [place] [phone] with questions.      warfarin ANTICOAGULANT (COUMADIN) 7.5 MG tablet Take by mouth 7.5 mg every Mon, Wed, Fri; 5 mg all other days or as directed. Next INR due 12/13/24. Call [facility] [phone] with questions.           EXAM:    Vitals: There were no vitals taken for this visit.  BMI: There is no height or weight on file to calculate BMI.    Derm:    Residual sutures intact.  Biopsy site stable.  Dorsal tissue somewhat boggy with what appears to be a superficial wound.  No clinical signs of infection.  No deep probing.

## 2025-01-04 ENCOUNTER — HEALTH MAINTENANCE LETTER (OUTPATIENT)
Age: 85
End: 2025-01-04

## 2025-01-07 ENCOUNTER — TELEPHONE (OUTPATIENT)
Dept: OTOLARYNGOLOGY | Facility: CLINIC | Age: 85
End: 2025-01-07
Payer: COMMERCIAL

## 2025-01-07 NOTE — TELEPHONE ENCOUNTER
Records Requested 01/07/2025 at 6:28 AM  [id number]   Facility [facility]   Referred by: Lary Le MD    Outcome Urgent referral for large possible vestibular schwannoma. Imaging report in CE. Image requested.  1/3/2025 CT head  12/31/2024 MR brain, MR cervical  9/26/2024, 7/31/2024 CT head    Image resolved in pacs

## 2025-01-07 NOTE — TELEPHONE ENCOUNTER
FUTURE VISIT INFORMATION      FUTURE VISIT INFORMATION:  Date: 1/21/25  Time: 2:30 PM  Location: [facility]   REFERRAL INFORMATION:  Referring provider:  Lary Le MD   Referring providers clinic:  [facility]   Reason for visit/diagnosis:  Unilateral vestibular schwannoma- Referred by Dr. Lary Le affiliated with [facility]     RECORDS REQUESTED FROM      Clinic name Comments Records Status Imaging Status   [facility]  1/2/25 telephone referral - Lary Le MD  CE    [facility] 1/3/2025 CT head  12/31/2024 MR brain, MR cervical  9/26/2024, 7/31/2024 CT head CE PACS   Neurology [facility] [place]   12/31/24 E-Visit -Suzan [place] APRN, CNP   CE    [facility] ENT 10/23/24 OV- Bo Valle PARAUL  8/30/24 audiogram CE

## 2025-01-08 ENCOUNTER — TRANSFERRED RECORDS (OUTPATIENT)
Dept: HEALTH INFORMATION MANAGEMENT | Facility: CLINIC | Age: 85
End: 2025-01-08
Payer: COMMERCIAL

## 2025-01-11 NOTE — PROGRESS NOTES
[facility]  Department of Neurosurgery    Name: Gregory Zhong  MRN: [id number]  : 1940  Referring provider: Lary Le     Reason for visit: Left vestibular schwannoma, new patient visit    Dear Dr. Le,    It was a pleasure to see Mr. Zhong in the [facility] today.  I have reviewed the referral notes and imaging and have summarized our visit here. As you recall, Mr. Zhong is a pleasant 84 year-old male with history of atrial fibrillation on warfarin and Alzheimer's dementia.  He underwent a workup that included an MRI scan showing a left vestibular schwannoma with mass effect on the brainstem.  He was seen by neurology who recommended a visit with our clinic to discuss options for management for the suspected vestibular schwannoma.    Review of Systems:   Pertinent items are noted in HPI or as in patient entered ROS below, remainder of complete ROS is negative.     Medications: Alendronate, melatonin, vitamins, patiromer vitamin D, warfarin    Allergies: Patient has no known allergies.      Past Medical History: Alzheimer's disease, right foot osteomyelitis, atrial flutter/fibrillation, bladder cancer, chronic kidney disease, coronary artery disease, dyslipidemia, GERD, hypertension, gallstones, tremor     Family History: Noncontributory     Social History: Lifetime non-smoker    Physical Exam:   General: No acute distress.   Head: No signs of trauma.    Eyes: Conjunctivae are normal.  Mouth/Throat: Oropharynx moist.  Neck: Normal range of motion.    Resp: No respiratory distress.   MSK: Moves all extremities.  No obvious deformity.  Neuro: The patient is fully oriented and quite pleasant. Speech normal. Extraocular movements are intact without nystagmus. Facial sensation is intact in V1, V2, V3 distributions. Facial nerve function is normal, rated as a House Brackmann 1, without synkinesis.  Palate is  symmetric. Shoulders are full strength. Tongue is midline. There is no pronator drift. Full strength in all extremities. Sensation intact throughout. No dysmetria with finger-nose-finger testing.  Psych: Normal mood and affect. Behavior is normal.      Audiogram:  We reviewed the audiogram today which shows:  Right PTA 44 dB, WRS 88 % at 75 dB  Left PTA 66 dB, WRS 0 % at 95 dB    Imaging:  We reviewed the results of the MRI scan performed on 12/31/2024.  This demonstrates a left sided solid and cystic vestibular schwannoma that measures 34 mm across the petrous face, 25 mm into the cistern, and 10 mm out the IAC.  There is a CSF cap.  There is mass effect on the brainstem and cerebellar peduncle but no apparent edema in the parenchyma.  The ventricle size appears age-appropriate.     Assessment:  Left vestibular schwannoma  2.   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   Brainstem compression    Plan:  We reviewed the patient's history, imaging, natural history and expected outcomes of conservative management and intervention.  We discussed the likely diagnosis of vestibular schwannoma in depth, including its benign and often indolent course, with an average growth rate of 1-2 mm/year. Vestibular schwannomas typically are located in the internal auditory canal and cerebellopontine angle.  There are four nerves that are most often involved: two vestibular (balance) nerves, cochlear (hearing) nerve, and the nerve for facial movement. We discussed the prognosis of this pathology and the treatment methods including observation with annual surveillance MRIs, radiation, and surgical resection.   Radiation treatment (in the form of radiotherapy/radiosurgery) often consists of one to five days of targeted treatment, and the risks are often delayed (3-6 months) but most notably include acceleration of hearing loss or issues with vertigo. It was emphasized that radiation therapy does not cure or reduce the size of the tumor, however  it aims to stop the further growth.  This tumor is larger than we typically radiate, since the complication profile is elevated with larger tumors.  This can lead to hydrocephalus, or edema in the brainstem.  Surgical resection was also discussed. Risks associated with surgical resection include the standard risks of surgery (bleeding, infection) however particular risk to hearing, facial movement and vertigo. Additional risks include CSF leak, facial numbness, effect on voice/swallowing, and stroke. With surgical resection if there was any concern that removal may damage the facial nerve, then often times we perform a subtotal resection, leave residual tumor to not cause damage to facial movement and consider post-op surveillance vs post-op radiation treatment.  Given his comorbidities and lack of attributable symptoms to this mass, we do not feel that surgery is warranted.  Given the absence of symptoms and his comorbidities, despite its large size, observation would be very appropriate.  We will monitor for hydrocephalus and/or tumoral hemorrhage on anticoagulation. We would recommend repeat MRI scan and audiogram in 6 months.  He will have a BiCROS evaluation  I encouraged Mr. Zhong to contact with any questions or concerns or if we may be of assistance in any way.      It was a pleasure to participate in the care of your patient. Please feel free to contact me at any point if I can be of any assistance for Mr. Zhong.    Sincerely,  Saurav Banegas MD       45 minutes spent on the date of the encounter doing chart review, review of outside records, review of test results, interpretation of tests, patient visit, documentation and discussion with other provider(s)

## 2025-01-14 DIAGNOSIS — Z01.10 ENCOUNTER FOR HEARING TEST: Primary | ICD-10-CM

## 2025-01-21 ENCOUNTER — PRE VISIT (OUTPATIENT)
Dept: OTOLARYNGOLOGY | Facility: CLINIC | Age: 85
End: 2025-01-21

## 2025-01-21 ENCOUNTER — OFFICE VISIT (OUTPATIENT)
Dept: OTOLARYNGOLOGY | Facility: CLINIC | Age: 85
End: 2025-01-21
Payer: COMMERCIAL

## 2025-01-21 ENCOUNTER — OFFICE VISIT (OUTPATIENT)
Dept: AUDIOLOGY | Facility: CLINIC | Age: 85
End: 2025-01-21
Attending: STUDENT IN AN ORGANIZED HEALTH CARE EDUCATION/TRAINING PROGRAM
Payer: COMMERCIAL

## 2025-01-21 ENCOUNTER — OFFICE VISIT (OUTPATIENT)
Dept: OTOLARYNGOLOGY | Facility: CLINIC | Age: 85
End: 2025-01-21
Attending: STUDENT IN AN ORGANIZED HEALTH CARE EDUCATION/TRAINING PROGRAM
Payer: COMMERCIAL

## 2025-01-21 DIAGNOSIS — D33.3 VESTIBULAR SCHWANNOMA (H): Primary | ICD-10-CM

## 2025-01-21 DIAGNOSIS — H90.3 ASYMMETRICAL SENSORINEURAL HEARING LOSS: ICD-10-CM

## 2025-01-21 DIAGNOSIS — I48.92 ATRIAL FLUTTER WITH CONTROLLED RESPONSE (H): ICD-10-CM

## 2025-01-21 DIAGNOSIS — D33.3 ACOUSTIC NEUROMA (H): Primary | ICD-10-CM

## 2025-01-21 DIAGNOSIS — H90.3 ASYMMETRICAL SENSORINEURAL HEARING LOSS: Primary | ICD-10-CM

## 2025-01-21 DIAGNOSIS — C67.9 MALIGNANT NEOPLASM OF URINARY BLADDER, UNSPECIFIED SITE (H): ICD-10-CM

## 2025-01-21 DIAGNOSIS — N18.32 STAGE 3B CHRONIC KIDNEY DISEASE (CKD) (H): ICD-10-CM

## 2025-01-21 PROCEDURE — 99204 OFFICE O/P NEW MOD 45 MIN: CPT | Performed by: OTOLARYNGOLOGY

## 2025-01-21 PROCEDURE — 99204 OFFICE O/P NEW MOD 45 MIN: CPT | Mod: 24 | Performed by: NEUROLOGICAL SURGERY

## 2025-01-21 ASSESSMENT — PAIN SCALES - GENERAL: PAINLEVEL_OUTOF10: NO PAIN (0)

## 2025-01-21 NOTE — LETTER
[place] [facility]  [facility] [address]  [address] [facility] [address]  Phone: [phone]  Fax: [phone]          2025    RE:   Gregory Zhong  [address] [facility] [address]      Dear Colleague,    Thank you for referring your patient, Gregory Zhong, to the [facility]. Please see a copy of my visit note below.      [facility]  Department of Neurosurgery    Name: Gregory Zhong  MRN: [id number]  : 1940  Referring provider: Lary Le     Reason for visit: Left vestibular schwannoma, new patient visit    Dear Dr. Le,    It was a pleasure to see Mr. Zhong in the [facility] today.  I have reviewed the referral notes and imaging and have summarized our visit here. As you recall, Mr. Zhong is a pleasant 84 year-old male with history of atrial fibrillation on warfarin and Alzheimer's dementia.  He underwent a workup that included an MRI scan showing a left vestibular schwannoma with mass effect on the brainstem.  He was seen by neurology who recommended a visit with our clinic to discuss options for management for the suspected vestibular schwannoma.    Review of Systems:   Pertinent items are noted in HPI or as in patient entered ROS below, remainder of complete ROS is negative.     Medications: Alendronate, melatonin, vitamins, patiromer vitamin D, warfarin    Allergies: Patient has no known allergies.      Past Medical History: Alzheimer's disease, right foot osteomyelitis, atrial flutter/fibrillation, bladder cancer, chronic kidney disease, coronary artery disease, dyslipidemia, GERD, hypertension, gallstones, tremor     Family History: Noncontributory     Social History: Lifetime non-smoker    Physical Exam:   General: No acute distress.   Head: No signs of  trauma.    Eyes: Conjunctivae are normal.  Mouth/Throat: Oropharynx moist.  Neck: Normal range of motion.    Resp: No respiratory distress.   MSK: Moves all extremities.  No obvious deformity.  Neuro: The patient is fully oriented and quite pleasant. Speech normal. Extraocular movements are intact without nystagmus. Facial sensation is intact in V1, V2, V3 distributions. Facial nerve function is normal, rated as a House Brackmann 1, without synkinesis.  Palate is symmetric. Shoulders are full strength. Tongue is midline. There is no pronator drift. Full strength in all extremities. Sensation intact throughout. No dysmetria with finger-nose-finger testing.  Psych: Normal mood and affect. Behavior is normal.      Audiogram:  We reviewed the audiogram today which shows:  Right PTA 44 dB, WRS 88 % at 75 dB  Left PTA 66 dB, WRS 0 % at 95 dB    Imaging:  We reviewed the results of the MRI scan performed on 12/31/2024.  This demonstrates a left sided solid and cystic vestibular schwannoma that measures 34 mm across the petrous face, 25 mm into the cistern, and 10 mm out the IAC.  There is a CSF cap.  There is mass effect on the brainstem and cerebellar peduncle but no apparent edema in the parenchyma.  The ventricle size appears age-appropriate.     Assessment:  Left vestibular schwannoma  2.   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   Brainstem compression    Plan:  We reviewed the patient's history, imaging, natural history and expected outcomes of conservative management and intervention.  We discussed the likely diagnosis of vestibular schwannoma in depth, including its benign and often indolent course, with an average growth rate of 1-2 mm/year. Vestibular schwannomas typically are located in the internal auditory canal and cerebellopontine angle.  There are four nerves that are most often involved: two vestibular (balance) nerves, cochlear (hearing) nerve, and the nerve for facial movement. We discussed the prognosis of this  pathology and the treatment methods including observation with annual surveillance MRIs, radiation, and surgical resection.   Radiation treatment (in the form of radiotherapy/radiosurgery) often consists of one to five days of targeted treatment, and the risks are often delayed (3-6 months) but most notably include acceleration of hearing loss or issues with vertigo. It was emphasized that radiation therapy does not cure or reduce the size of the tumor, however it aims to stop the further growth.  This tumor is larger than we typically radiate, since the complication profile is elevated with larger tumors.  This can lead to hydrocephalus, or edema in the brainstem.  Surgical resection was also discussed. Risks associated with surgical resection include the standard risks of surgery (bleeding, infection) however particular risk to hearing, facial movement and vertigo. Additional risks include CSF leak, facial numbness, effect on voice/swallowing, and stroke. With surgical resection if there was any concern that removal may damage the facial nerve, then often times we perform a subtotal resection, leave residual tumor to not cause damage to facial movement and consider post-op surveillance vs post-op radiation treatment.  Given his comorbidities and lack of attributable symptoms to this mass, we do not feel that surgery is warranted.  Given the absence of symptoms and his comorbidities, despite its large size, observation would be very appropriate.  We will monitor for hydrocephalus and/or tumoral hemorrhage on anticoagulation. We would recommend repeat MRI scan and audiogram in 6 months.  He will have a BiCROS evaluation  I encouraged Mr. Zhnog to contact with any questions or concerns or if we may be of assistance in any way.      It was a pleasure to participate in the care of your patient. Please feel free to contact me at any point if I can be of any assistance for Mr. Zhong.    Sincerely,  Saurav Banegas MD        45 minutes spent on the date of the encounter doing chart review, review of outside records, review of test results, interpretation of tests, patient visit, documentation and discussion with other provider(s)          Again, thank you for allowing me to participate in the care of your patient.      Sincerely,    Saurav Banegas MD

## 2025-01-21 NOTE — PROGRESS NOTES
Neurotology Clinic      Name: Gregory Zhong  MRN: [id number]  Age: 84 year old  : 1940  Referring provider: Lary Le  2025     Chief Complaint:   Consultation    History of Present Illness:   Gregory Zhong is a 84 year old male who presents for consultation regarding unilateral vestibular schwannoma. Consultation was requested by Lary Le.     24 E-Visit -Suzan Montana APRN, CNP: Regarding this visit, Attempted to contact patient x2 regarding large mass found on brain MRI. Suspected schwannoma. Also noted enhancement concerning for subdural collections of indeterminate age. I recommended asap CT scan for evaluation of potential recent subdural bleed. Additionally, I recommend urgent follow up with Neurosurgery given the size and extent of patient's suspected schwannoma. MyChart message since in attempt to get all the patient. No signed release so I am unable to contact family members in attempts to get patient this information. Also send out a message to nursing in the event the patient calls clinic back.    Today, the patient reports he has had hearing loss in the left ear worse than the right for more than 10 years.  He has no double vision, facial numbness, or facial weakness. He has no vertigo or dizziness but has suffered some falls.  He have to have toes amputated related to foot cellulitis/ulcer and suffered related falls and additional surgery and debridements most recently in December.  He is using a wheelchair for mobility in most circumstances, and has a walker for other circumstances.  He has cognitive impairment, notes mentioning hallucinations, with consideration of dementia.  Coumadin clinic is actively involved in his care, used for A Fib.    This visit is for a second opinion.    Review of Systems:   Pertinent items are noted in HPI or as in patient entered ROS below, remainder of complete ROS is negative.       2025     2:49 PM    ENT ROS    Neurology Dizzy spells   Cardiopulmonary Cough        Active Medications:     Current Outpatient Medications:     alendronate (FOSAMAX) 70 MG tablet, Take 70 mg by mouth every 7 days., Disp: , Rfl:     melatonin 3 MG tablet, Take 3 mg by mouth at bedtime., Disp: , Rfl:     Multiple Vitamins-Minerals (PRESERVISION AREDS 2) CHEW, Take 1 tablet by mouth 2 times daily., Disp: , Rfl:     patiromer (VELTASSA) 8.4 g packet, Take 8.4 g by mouth. on Mon Wed Fri at 1500, Disp: , Rfl:     polyethylene glycol (MIRALAX) 17 GM/Dose powder, Take 1 Capful by mouth daily., Disp: , Rfl:     vitamin D3 (CHOLECALCIFEROL) 50 mcg (2000 units) tablet, Take 1 tablet by mouth daily., Disp: , Rfl:     warfarin ANTICOAGULANT (COUMADIN) 5 MG tablet, Take by mouth 7.5 mg every Mon, Wed, Fri; 5 mg all other days or as directed. Next INR due 12/13/24. Call [facility] [phone] with questions., Disp: , Rfl:     warfarin ANTICOAGULANT (COUMADIN) 7.5 MG tablet, Take by mouth 7.5 mg every Mon, Wed, Fri; 5 mg all other days or as directed. Next INR due 12/13/24. Call [facility] [place] [phone] with questions., Disp: , Rfl:       Allergies:   Patient has no known allergies.      Past Medical History:  Past Medical History:   Diagnosis Date    Acute hematogenous osteomyelitis of right foot (H)     Anemia     Atrial flutter (H)     Bladder cancer (H)     Cellulitis of leg     Cervicalgia     left arm radiculopathy starting July 2012    Choledochocele     with sludge; stable    Chronic atrial fibrillation (H)     Chronic kidney disease, stage III (moderate) (H)     Coronary artery disease     Dyslipidemia     Gastroesophageal reflux disease     Hearing loss     Hereditary and idiopathic peripheral neuropathy     History of basal cell carcinoma     HLD (hyperlipidemia)     Hyperparathyroidism     Hypertension     Impotence of organic origin     Macular degeneration of right eye     MCI (mild cognitive  impairment)     Mild cognitive impairment     Multiple gallstones     Pre-syncope     Prostate cancer (H)     Sinus bradycardia     Tremor     Tremor      Patient Active Problem List   Diagnosis    Cellulitis of right foot    Warfarin anticoagulation    Elevated lactic acid level    Closed head injury, initial encounter    Dislocation of finger, initial encounter    Fall, initial encounter    Severe sepsis (H)    Ulcer of right foot with fat layer exposed (H)    Fever, unspecified fever cause    Malignant neoplasm of urinary bladder, unspecified site (H)    Atrial flutter with controlled response (H)    Stage 3b chronic kidney disease (CKD) (H)        Past Surgical History:  Past Surgical History:   Procedure Laterality Date    AMPUTATE TOE(S) Right 9/12/2024    Procedure: Right foot partial first ray resection, right foot partial third digit amputation, right foot excisional debridement down to and including tendon, site measuring 7 cm x 4 cm x 2 cm;  Surgeon: Nedra Workman DPM;  Location: [facility] OR    AMPUTATE TOE(S) Right 12/12/2024    Procedure: Right foot second digit amputation;  Surgeon: Nedra Workman DPM;  Location: [facility] OR    APPENDECTOMY      BIOPSY BONE FOOT Right 12/12/2024    Procedure: Right foot first metatarsal bone biopsy and culture;  Surgeon: Nedra Workman DPM;  Location: [facility] OR    Cryoablation of left and right renal masses      CYSTOSCOPY      Excision of basal cell carcinoma      IRRIGATION AND DEBRIDEMENT FOOT, COMBINED Right 9/14/2024    Procedure: Excisional debridement of necrotic tissue right foot;  Surgeon: Catarina Almendarez DPM, Podiatry/Foot and Ankle Surgery;  Location:  OR    IRRIGATION AND DEBRIDEMENT FOOT, COMBINED Right 12/19/2024    Procedure: Right foot debridement;  Surgeon: Nedra Workman DPM;  Location:  OR    MOHS MICROGRAPHIC PROCEDURE      TONSILLECTOMY         Family History:   No family history on file.      Social History:   Social History     Tobacco  Use    Smoking status: Never    Smokeless tobacco: Never   Substance Use Topics    Alcohol use: Yes     Comment: 1 drink per week with dinner    Drug use: Never        Physical Exam:   Constitutional:  The patient was accompanied by his sons, well-groomed, and in no acute distress.     Skin: Normal:  warm and pink without rash   Neurologic: Alert and oriented x 3.  CN's III-XII within normal limits.  Voice normal.    Psychiatric: The patient's affect was calm, cooperative, and appropriate.     Communication:  Normal; communicates verbally, normal voice quality.   Respiratory: Breathing comfortably without stridor or exertion of accessory muscles.    Head/Face:  No lesions or scars. No sinus tenderness.     Eyes: Pupils were equal and reactive.  Extraocular movement intact.     Ears: Pinnae and tragus non-tender.  EAC's and TM's were clear.          Audiogram:  AUDIOGRAM: He underwent an audiogram today. This demonstrated:  Right mild to mod severe sensorineural hearing loss, 35 dB SRT with 88% WRS  Left severe sensorineural hearing loss, 55dB SRT, 0% WRS    Audiogram was independently reviewed    Imaging was independently reviewed:  12/31/2024 MR brain WO IV Contrast  IMPRESSION:   1. There is a large mass within the left cerebellar pontine angle cistern and extending into the left internal auditory canal. The lesion appears solid and cystic and is favored to reflect a schwannoma. Meningioma is considered less likely.   2.  There is associated mass effect with mass effect upon the left lateral jeyson, brachium pontis, and ventral left cerebellar hemisphere. There is asymmetric volume loss along the ventral lateral aspect of the left cerebellar hemisphere with a small amount of associated FLAIR hyperintensity compatible with edema or gliosis.   3.  There is a thin linear band of FLAIR hyperintensity along the right temporal parietal inner table concerning for a small area of age-indeterminate subdural blood products.  Suggest correlation with CT brain to exclude recent hemorrhage.   4.  Moderate to advanced volume loss and mild presumed sequela of chronic microvascular ischemic change.     1/3/2025 CT head   IMPRESSION:   1. Grossly stable appearance of left cerebellopontine angle cistern mass lesion, within the constraints of noncontrast CT technique. As before, there is edema and/or gliosis with mass effect upon the brainstem and left cerebellar hemisphere.   2.  No evidence for progressive mass effect.   3.  No convincing CT abnormality to correspond with the questioned subdural blood vs dural thickening along the right temporoparietal inner table. This could represent interval resolution of the finding. Alternatively, the finding may not be visible by CT technique, given its subtlety on MRI.   4.  Age-related change.     Assessment and Plan:  Gregory Zhong is a 84 year old male who presents for consultation regarding a large cystic unilateral vestibular schwannoma with non-serviceable hearing.     Treatment options and all risks and benefits of  each were discussed, including observation, radiosurgery, and surgical resection.  His comorbidities and age make surgery less advisable. The tumor is too large for radiosurgery.  Obs, with shunt if worseed - 6 month imagiang a    Discussed BiCROS and will gieve medical clearance ***    Monitor goal to detect hydro, shunt if needed      Follow-up: No follow-ups on file.      Scribe Disclosure:   By signing my name below, I, Cindi Chen, attest that this documentation has been prepared under the direction and in the presence of Lori Coyne MD.  - Electronically Signed: Cindi Chen 01/21/25     Provider Disclosure:  I agree with above History, Review of Systems, Physical exam and Plan.  I have reviewed the content of the documentation and have edited it as needed. I have personally performed the services documented here and the documentation accurately represents those  services and the decisions I have made.      Electronically signed by:    Lori Coyne MD  Otology & Neurotology  [facility]        {ENT ATTESTATION:35410893}    {[facility] 2021 Documentation (Optional):914746}  {2021 E&M time (Optional):614532}  {Provider  Link to [facility] Help Grid :716221}

## 2025-01-21 NOTE — NURSING NOTE
Chief Complaint   Patient presents with    Consult     Vestibular schwannoma    Gabriel Murrell LPN

## 2025-01-21 NOTE — Clinical Note
2025       RE: Gregory Zhong  [address] [facility] [address]     Dear Colleague,    Thank you for referring your patient, Gregory Zhong, to the [facility] EAR NOSE AND THROAT CLINIC [place] at [facility]. Please see a copy of my visit note below.      Neurotology Clinic      Name: Gregory Zhong  MRN: [id number]  Age: 84 year old  : 1940  Referring provider: Lary Le  2025     Chief Complaint:   Consultation    History of Present Illness:   Gregory Zhong is a 84 year old male who presents for consultation regarding unilateral vestibular schwannoma. Consultation was requested by Lary Le.     24 E-Visit -Suzan Montana APRN, CNP: Regarding this visit, Attempted to contact patient x2 regarding large mass found on brain MRI. Suspected schwannoma. Also noted enhancement concerning for subdural collections of indeterminate age. I recommended asap CT scan for evaluation of potential recent subdural bleed. Additionally, I recommend urgent follow up with Neurosurgery given the size and extent of patient's suspected schwannoma. [employer] message since in attempt to get all the patient. No signed release so I am unable to contact family members in attempts to get patient this information. Also send out a message to nursing in the event the patient calls clinic back.    Today, the patient reports he has had hearing loss in the left ear worse than the right for more than 10 years.  He has no double vision, facial numbness, or facial weakness. He has no vertigo or dizziness but has suffered some falls.  He have to have toes amputated related to foot cellulitis/ulcer and suffered related falls and additional surgery and debridements most recently in December.  He is using a wheelchair for mobility in most circumstances, and has a walker for other circumstances.  He has cognitive impairment, notes mentioning  hallucinations, with consideration of dementia.  Coumadin clinic is actively involved in his care, used for A Fib.    This visit is for a second opinion.    Review of Systems:   Pertinent items are noted in HPI or as in patient entered ROS below, remainder of complete ROS is negative.       1/21/2025     2:49 PM    ENT ROS   Neurology Dizzy spells   Cardiopulmonary Cough        Active Medications:     Current Outpatient Medications:     alendronate (FOSAMAX) 70 MG tablet, Take 70 mg by mouth every 7 days., Disp: , Rfl:     melatonin 3 MG tablet, Take 3 mg by mouth at bedtime., Disp: , Rfl:     Multiple Vitamins-Minerals (PRESERVISION AREDS 2) CHEW, Take 1 tablet by mouth 2 times daily., Disp: , Rfl:     patiromer (VELTASSA) 8.4 g packet, Take 8.4 g by mouth. on Mon Wed Fri at 1500, Disp: , Rfl:     polyethylene glycol (MIRALAX) 17 GM/Dose powder, Take 1 Capful by mouth daily., Disp: , Rfl:     vitamin D3 (CHOLECALCIFEROL) 50 mcg (2000 units) tablet, Take 1 tablet by mouth daily., Disp: , Rfl:     warfarin ANTICOAGULANT (COUMADIN) 5 MG tablet, Take by mouth 7.5 mg every Mon, Wed, Fri; 5 mg all other days or as directed. Next INR due 12/13/24. Call [facility] [place] [phone] with questions., Disp: , Rfl:     warfarin ANTICOAGULANT (COUMADIN) 7.5 MG tablet, Take by mouth 7.5 mg every Mon, Wed, Fri; 5 mg all other days or as directed. Next INR due 12/13/24. Call [facility] [place] [phone] with questions., Disp: , Rfl:       Allergies:   Patient has no known allergies.      Past Medical History:  Past Medical History:   Diagnosis Date    Acute hematogenous osteomyelitis of right foot (H)     Anemia     Atrial flutter (H)     Bladder cancer (H)     Cellulitis of leg     Cervicalgia     left arm radiculopathy starting July 2012    Choledochocele     with sludge; stable    Chronic atrial fibrillation (H)     Chronic kidney disease, stage III (moderate) (H)     Coronary  artery disease     Dyslipidemia     Gastroesophageal reflux disease     Hearing loss     Hereditary and idiopathic peripheral neuropathy     History of basal cell carcinoma     HLD (hyperlipidemia)     Hyperparathyroidism     Hypertension     Impotence of organic origin     Macular degeneration of right eye     MCI (mild cognitive impairment)     Mild cognitive impairment     Multiple gallstones     Pre-syncope     Prostate cancer (H)     Sinus bradycardia     Tremor     Tremor      Patient Active Problem List   Diagnosis    Cellulitis of right foot    Warfarin anticoagulation    Elevated lactic acid level    Closed head injury, initial encounter    Dislocation of finger, initial encounter    Fall, initial encounter    Severe sepsis (H)    Ulcer of right foot with fat layer exposed (H)    Fever, unspecified fever cause    Malignant neoplasm of urinary bladder, unspecified site (H)    Atrial flutter with controlled response (H)    Stage 3b chronic kidney disease (CKD) (H)        Past Surgical History:  Past Surgical History:   Procedure Laterality Date    AMPUTATE TOE(S) Right 9/12/2024    Procedure: Right foot partial first ray resection, right foot partial third digit amputation, right foot excisional debridement down to and including tendon, site measuring 7 cm x 4 cm x 2 cm;  Surgeon: Nedra Workman DPM;  Location: [facility] OR    AMPUTATE TOE(S) Right 12/12/2024    Procedure: Right foot second digit amputation;  Surgeon: Nedra Workman DPM;  Location: [facility] OR    APPENDECTOMY      BIOPSY BONE FOOT Right 12/12/2024    Procedure: Right foot first metatarsal bone biopsy and culture;  Surgeon: Nedra Workman DPM;  Location: [facility] OR    Cryoablation of left and right renal masses      CYSTOSCOPY      Excision of basal cell carcinoma      IRRIGATION AND DEBRIDEMENT FOOT, COMBINED Right 9/14/2024    Procedure: Excisional debridement of necrotic tissue right foot;  Surgeon: Catarina Almendarez DPM, Podiatry/Foot and Ankle  Surgery;  Location: [facility] OR    IRRIGATION AND DEBRIDEMENT FOOT, COMBINED Right 12/19/2024    Procedure: Right foot debridement;  Surgeon: Nedra Workman DPM;  Location: [facility] OR    MOHS MICROGRAPHIC PROCEDURE      TONSILLECTOMY         Family History:   No family history on file.      Social History:   Social History     Tobacco Use    Smoking status: Never    Smokeless tobacco: Never   Substance Use Topics    Alcohol use: Yes     Comment: 1 drink per week with dinner    Drug use: Never        Physical Exam:   Constitutional:  The patient was accompanied by his sons, well-groomed, and in no acute distress.     Skin: Normal:  warm and pink without rash   Neurologic: Alert and oriented x 3.  CN's III-XII within normal limits.  Voice normal.    Psychiatric: The patient's affect was calm, cooperative, and appropriate.     Communication:  Normal; communicates verbally, normal voice quality.   Respiratory: Breathing comfortably without stridor or exertion of accessory muscles.    Head/Face:  No lesions or scars. No sinus tenderness.     Eyes: Pupils were equal and reactive.  Extraocular movement intact.     Ears: Pinnae and tragus non-tender.  EAC's and TM's were clear.          Audiogram:  AUDIOGRAM: He underwent an audiogram today. This demonstrated:  Right mild to mod severe sensorineural hearing loss, 35 dB SRT with 88% WRS  Left severe sensorineural hearing loss, 55dB SRT, 0% WRS    Audiogram was independently reviewed    Imaging was independently reviewed:  12/31/2024 MR brain WO IV Contrast  IMPRESSION:   1. There is a large mass within the left cerebellar pontine angle cistern and extending into the left internal auditory canal. The lesion appears solid and cystic and is favored to reflect a schwannoma. Meningioma is considered less likely.   2.  There is associated mass effect with mass effect upon the left lateral jeyson, brachium pontis, and ventral left cerebellar hemisphere. There is asymmetric volume loss  along the ventral lateral aspect of the left cerebellar hemisphere with a small amount of associated FLAIR hyperintensity compatible with edema or gliosis.   3.  There is a thin linear band of FLAIR hyperintensity along the right temporal parietal inner table concerning for a small area of age-indeterminate subdural blood products. Suggest correlation with CT brain to exclude recent hemorrhage.   4.  Moderate to advanced volume loss and mild presumed sequela of chronic microvascular ischemic change.     1/3/2025 CT head   IMPRESSION:   1. Grossly stable appearance of left cerebellopontine angle cistern mass lesion, within the constraints of noncontrast CT technique. As before, there is edema and/or gliosis with mass effect upon the brainstem and left cerebellar hemisphere.   2.  No evidence for progressive mass effect.   3.  No convincing CT abnormality to correspond with the questioned subdural blood vs dural thickening along the right temporoparietal inner table. This could represent interval resolution of the finding. Alternatively, the finding may not be visible by CT technique, given its subtlety on MRI.   4.  Age-related change.     Assessment and Plan:  Gregory Zhong is a 84 year old male who presents for consultation regarding a large cystic unilateral vestibular schwannoma with non-serviceable hearing.     Treatment options and all risks and benefits of  each were discussed, including observation, radiosurgery, and surgical resection.  His comorbidities and age make surgery less advisable. The tumor is too large for radiosurgery.  Obs, with shunt if worseed - 6 month imagiang a    Discussed BiCROS and jonathan cobose medical clearance ***    Monitor goal to detect hydro, shunt if needed      Follow-up: No follow-ups on file.      Scribe Disclosure:   By signing my name below, I, Cindi Chen, attest that this documentation has been prepared under the direction and in the presence of Lori Coyne MD.  -  Electronically Signed: Cindi hCen 01/21/25     Provider Disclosure:  I agree with above History, Review of Systems, Physical exam and Plan.  I have reviewed the content of the documentation and have edited it as needed. I have personally performed the services documented here and the documentation accurately represents those services and the decisions I have made.      Electronically signed by:    Lori Coyne MD  Otology & Neurotology  [facility]        {ENT ATTESTATION:31996895}    {[facility] 2021 Documentation (Optional):352624}  {2021 E&M time (Optional):481130}  {Provider  Link to [facility] Help Grid :973817}          Again, thank you for allowing me to participate in the care of your patient.      Sincerely,    Lori Coyne MD

## 2025-01-21 NOTE — LETTER
Hearing Aid Medical Clearance    Gregory Zhong  January 21, 2025        This patient has received a medical examination and may be considered a suitable candidate for a BiCROS hearing aid.         Physician:__________________________________________________      Dr. Lori Coyne

## 2025-01-21 NOTE — PROGRESS NOTES
AUDIOLOGY REPORT    SUMMARY: Audiology visit completed. See audiogram for results.      RECOMMENDATIONS: Follow-up with ENT.      Darshana Pablo, CCC-A, [facility]  Licensed Audiologist  [place] #[id number]

## 2025-01-21 NOTE — LETTER
2025       RE: Gregory Zhong  [address] [facility] [address]     Dear Colleague,    Thank you for referring your patient, Gregory Zhong, to the [facility] EAR NOSE AND THROAT CLINIC [place] at [facility]. Please see a copy of my visit note below.    [facility]  Department of Neurosurgery    Name: Gregory Zhogn  MRN: [id number]  : 1940  Referring provider: Lary Le     Reason for visit: Left vestibular schwannoma, new patient visit    Dear Dr. Le,    It was a pleasure to see Mr. Zhong in the [facility] today.  I have reviewed the referral notes and imaging and have summarized our visit here. As you recall, Mr. Zhong is a pleasant 84 year-old male with history of atrial fibrillation on warfarin and Alzheimer's dementia.  He underwent a workup that included an MRI scan showing a left vestibular schwannoma with mass effect on the brainstem.  He was seen by neurology who recommended a visit with our clinic to discuss options for management for the suspected vestibular schwannoma.    Review of Systems:   Pertinent items are noted in HPI or as in patient entered ROS below, remainder of complete ROS is negative.     Medications: Alendronate, melatonin, vitamins, patiromer vitamin D, warfarin    Allergies: Patient has no known allergies.      Past Medical History: Alzheimer's disease, right foot osteomyelitis, atrial flutter/fibrillation, bladder cancer, chronic kidney disease, coronary artery disease, dyslipidemia, GERD, hypertension, gallstones, tremor     Family History: Noncontributory     Social History: Lifetime non-smoker    Physical Exam:   General: No acute distress.   Head: No signs of trauma.    Eyes: Conjunctivae are normal.  Mouth/Throat: Oropharynx moist.  Neck: Normal range of motion.    Resp: No respiratory distress.    MSK: Moves all extremities.  No obvious deformity.  Neuro: The patient is fully oriented and quite pleasant. Speech normal. Extraocular movements are intact without nystagmus. Facial sensation is intact in V1, V2, V3 distributions. Facial nerve function is normal, rated as a House Brackmann 1, without synkinesis.  Palate is symmetric. Shoulders are full strength. Tongue is midline. There is no pronator drift. Full strength in all extremities. Sensation intact throughout. No dysmetria with finger-nose-finger testing.  Psych: Normal mood and affect. Behavior is normal.      Audiogram:  We reviewed the audiogram today which shows:  Right PTA 44 dB, WRS 88 % at 75 dB  Left PTA 66 dB, WRS 0 % at 95 dB    Imaging:  We reviewed the results of the MRI scan performed on 12/31/2024.  This demonstrates a left sided solid and cystic vestibular schwannoma that measures 34 mm across the petrous face, 25 mm into the cistern, and 10 mm out the IAC.  There is a CSF cap.  There is mass effect on the brainstem and cerebellar peduncle but no apparent edema in the parenchyma.  The ventricle size appears age-appropriate.     Assessment:  Left vestibular schwannoma  2.   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   Brainstem compression    Plan:  We reviewed the patient's history, imaging, natural history and expected outcomes of conservative management and intervention.  We discussed the likely diagnosis of vestibular schwannoma in depth, including its benign and often indolent course, with an average growth rate of 1-2 mm/year. Vestibular schwannomas typically are located in the internal auditory canal and cerebellopontine angle.  There are four nerves that are most often involved: two vestibular (balance) nerves, cochlear (hearing) nerve, and the nerve for facial movement. We discussed the prognosis of this pathology and the treatment methods including observation with annual surveillance MRIs, radiation, and surgical resection.   Radiation  treatment (in the form of radiotherapy/radiosurgery) often consists of one to five days of targeted treatment, and the risks are often delayed (3-6 months) but most notably include acceleration of hearing loss or issues with vertigo. It was emphasized that radiation therapy does not cure or reduce the size of the tumor, however it aims to stop the further growth.  This tumor is larger than we typically radiate, since the complication profile is elevated with larger tumors.  This can lead to hydrocephalus, or edema in the brainstem.  Surgical resection was also discussed. Risks associated with surgical resection include the standard risks of surgery (bleeding, infection) however particular risk to hearing, facial movement and vertigo. Additional risks include CSF leak, facial numbness, effect on voice/swallowing, and stroke. With surgical resection if there was any concern that removal may damage the facial nerve, then often times we perform a subtotal resection, leave residual tumor to not cause damage to facial movement and consider post-op surveillance vs post-op radiation treatment.  Given his comorbidities and lack of attributable symptoms to this mass, we do not feel that surgery is warranted.  Given the absence of symptoms and his comorbidities, despite its large size, observation would be very appropriate.  We will monitor for hydrocephalus and/or tumoral hemorrhage on anticoagulation. We would recommend repeat MRI scan and audiogram in 6 months.  He will have a BiCROS evaluation  I encouraged Mr. Zhong to contact with any questions or concerns or if we may be of assistance in any way.      It was a pleasure to participate in the care of your patient. Please feel free to contact me at any point if I can be of any assistance for Mr. Zhong.    Sincerely,  Saurav Banegas MD       45 minutes spent on the date of the encounter doing chart review, review of outside records, review of test results, interpretation  of tests, patient visit, documentation and discussion with other provider(s)        Again, thank you for allowing me to participate in the care of your patient.      Sincerely,    Saurav Banegas MD

## 2025-01-21 NOTE — PATIENT INSTRUCTIONS
You were seen today with Dr. Lori Coyne and Dr. Saurav Banegas.     Next steps:  Please return to clinic in 6 months with an updated MRI and audiogram.         How to Contact Us  Send a cVidyat message to your provider.   Call the clinic - your call will be routed appropriately.   ENT Clinic: [phone]   Neurosurgery Clinic: [phone]  To speak directly to an RN Care Coordinator:  Lorin RN: [phone]  Alycia RN: [phone]    Note: We do our best to check voicemail frequently throughout the day and make every effort to return calls within 1-2 business days. For urgent matters, please use the general clinic phone numbers listed above.

## 2025-01-22 ENCOUNTER — OFFICE VISIT (OUTPATIENT)
Dept: PODIATRY | Facility: CLINIC | Age: 85
End: 2025-01-22
Payer: COMMERCIAL

## 2025-01-22 VITALS
DIASTOLIC BLOOD PRESSURE: 68 MMHG | SYSTOLIC BLOOD PRESSURE: 134 MMHG | WEIGHT: 199.96 LBS | BODY MASS INDEX: 25.67 KG/M2

## 2025-01-22 DIAGNOSIS — L97.512 RIGHT FOOT ULCER, WITH FAT LAYER EXPOSED (H): ICD-10-CM

## 2025-01-22 DIAGNOSIS — T81.30XA WOUND DEHISCENCE: Primary | ICD-10-CM

## 2025-01-22 DIAGNOSIS — Z89.421 STATUS POST AMPUTATION OF LESSER TOE OF RIGHT FOOT: ICD-10-CM

## 2025-01-22 NOTE — PATIENT INSTRUCTIONS
Thank you for choosing [facility] / Foot & Ankle Surgery!    DR. PALOMINO'S LOCATIONS  [facility] APPOINTMENTS: [phone] [address] TRIAGE NURSE: [phone]   [place] [address] RADIOLOGY: [phone]   FAX: [phone] BILLING QUESTIONS: [phone]    CONSUMER HERNAN[facility] LINE:[phone]   [facility]    [facility] [id number]    [place] [address]    PH: [phone]    FAX: [phone]      Follow up:   -3 weeks    -Continue to change the dressing every day. Cleanse site with wound cleanser. Pad dry. Paint incision with betadine. Cover with gauze, gauze wrap and an ace bandage. No compression dressing is recommended at this time.   -Okay to be WBAT to LLE. Continue to walk around apartment and with PT. Use wheelchair for all other, longer distance activities.

## 2025-01-23 NOTE — PROGRESS NOTES
ASSESSMENT:  S/p right foot second toe amputation on 12/12, followed by debridement of hematoma on 12/19/24 --> now with incisional dehiscence, depth to subcu.    Hba1c: 5.2    MEDICAL DECISION MAKING:  -Discussed all findings with patient. Chart and imaging reviewed.   -Right foot evaluated: sutures removed. Some dehiscence of incision to proximal aspect. Excisional debridement of site.   -Recommend continued wound care. Dressing orders placed.   -Provided WB restrictions. Continue surgical shoe. Continue use of wheelchair for long distances.   -Follow up in 3-4 weeks for reevaluation. No signs of infection to sites.       Nedra Workman DPM   [place] [facility] of Podiatry/Foot & Ankle Surgery      ____________________________________________________________________    HPI:       Gregory follows up with his son after hospitalization from surgical procedures on 12/12/2024 and 12/19/2024.   12/12/2024: Right second toe amputation for treatment of osteomyelitis and right first metatarsal bone biopsy.  Bone biopsy was negative.  12/18/24: Examined in clinic and found to have incisional dehiscence and hematoma prompting return to the OR.  12/19/2024:  Right foot excisional debridement down to and including tendon, site measuring 5 cm x 3 cm x 3 cm   Gregory resides at a nursing home and has assistance with monitoring and wound cares.     1/23: Follows up for right foot. Denies pain to the site. States some drainage. Per patient and his son, ongoing issues with falls at the assisted living. Had hematoma debridement with closure on 12/19/24.     Past Medical History:   Diagnosis Date    Acute hematogenous osteomyelitis of right foot (H)     Anemia     Atrial flutter (H)     Bladder cancer (H)     Cellulitis of leg     Cervicalgia     left arm radiculopathy starting July 2012    Choledochocele     with sludge; stable    Chronic atrial fibrillation (H)     Chronic kidney disease, stage III (moderate) (H)     Coronary  artery disease     Dyslipidemia     Gastroesophageal reflux disease     Hearing loss     Hereditary and idiopathic peripheral neuropathy     History of basal cell carcinoma     HLD (hyperlipidemia)     Hyperparathyroidism     Hypertension     Impotence of organic origin     Macular degeneration of right eye     MCI (mild cognitive impairment)     Mild cognitive impairment     Multiple gallstones     Pre-syncope     Prostate cancer (H)     Sinus bradycardia     Tremor     Tremor    *  *  Past Surgical History:   Procedure Laterality Date    AMPUTATE TOE(S) Right 9/12/2024    Procedure: Right foot partial first ray resection, right foot partial third digit amputation, right foot excisional debridement down to and including tendon, site measuring 7 cm x 4 cm x 2 cm;  Surgeon: Nedra Workman DPM;  Location: [facility] OR    AMPUTATE TOE(S) Right 12/12/2024    Procedure: Right foot second digit amputation;  Surgeon: Nedra Workman DPM;  Location: [facility] OR    APPENDECTOMY      BIOPSY BONE FOOT Right 12/12/2024    Procedure: Right foot first metatarsal bone biopsy and culture;  Surgeon: Nedra Workman DPM;  Location: [facility] OR    Cryoablation of left and right renal masses      CYSTOSCOPY      Excision of basal cell carcinoma      IRRIGATION AND DEBRIDEMENT FOOT, COMBINED Right 9/14/2024    Procedure: Excisional debridement of necrotic tissue right foot;  Surgeon: Catarina Almendarez DPM, Podiatry/Foot and Ankle Surgery;  Location: [facility] OR    IRRIGATION AND DEBRIDEMENT FOOT, COMBINED Right 12/19/2024    Procedure: Right foot debridement;  Surgeon: Nedra Workman DPM;  Location: [facility] OR    MOHS MICROGRAPHIC PROCEDURE      TONSILLECTOMY     *  *  Current Outpatient Medications   Medication Sig Dispense Refill    alendronate (FOSAMAX) 70 MG tablet Take 70 mg by mouth every 7 days.      melatonin 3 MG tablet Take 3 mg by mouth at bedtime.      Multiple Vitamins-Minerals (PRESERVISION AREDS 2) CHEW Take 1 tablet by mouth 2 times  daily.      patiromer (VELTASSA) 8.4 g packet Take 8.4 g by mouth. on Mon Wed Fri at 1500      polyethylene glycol (MIRALAX) 17 GM/Dose powder Take 1 Capful by mouth daily.      vitamin D3 (CHOLECALCIFEROL) 50 mcg (2000 units) tablet Take 1 tablet by mouth daily.      warfarin ANTICOAGULANT (COUMADIN) 5 MG tablet Take by mouth 7.5 mg every Mon, Wed, Fri; 5 mg all other days or as directed. Next INR due 12/13/24. Call [facility] [place] [phone] with questions.      warfarin ANTICOAGULANT (COUMADIN) 7.5 MG tablet Take by mouth 7.5 mg every Mon, Wed, Fri; 5 mg all other days or as directed. Next INR due 12/13/24. Call [facility] [phone] with questions.           EXAM:    Vitals: /68   Wt 90.7 kg (199 lb 15.3 oz)   BMI 25.67 kg/m    BMI: Body mass index is 25.67 kg/m .    General appearance: Patient is alert and fully cooperative with history & exam.  No sign of distress is noted during the visit.      Respiratory: Breathing is regular & unlabored while sitting.      HEENT: Hearing is intact to spoken word.  Speech is clear.  No gross evidence of visual impairment that would impact ambulation.      Derm:      Right foot incision site: distal site epithelialized, proximal site with some dehiscence. Sutures removed. Wound bed as below, fibro- granular. Depth to subcutaneous tissue. Measures 3 cm x .5 cm x .4 cm. No cellulitis or ischemia.     Vascular: Dorsalis pedis and posterior tibial pulses are intact & regular bilaterally.  CFT and skin temperature is normal to both lower extremities.      Neurologic: Lower extremity sensation is diminished, bilateral foot, to light touch.  No evidence of neurological-based weakness or contracture in the lower extremities.       Musculoskeletal: Patient is ambulatory without an assistive device or brace.  S/p partial first ray, second toe amputation and partial third digit amputation.     Psychiatric: Affect is pleasant  & appropriate.    PROCEDURE:   Verbal consent was obtained for debridement. A 15 blade was used to excise the nonivable tissue to the ulcer, down to and including subcutaneous tissue, on the right foot. No noted purulence afterwards. Ulcer measures 13 cm x .5 cm x .4 cm. No probe to bone. Well tolerated. Site was cleansed with alcohol.

## 2025-02-12 ENCOUNTER — OFFICE VISIT (OUTPATIENT)
Dept: PODIATRY | Facility: CLINIC | Age: 85
End: 2025-02-12
Payer: COMMERCIAL

## 2025-02-12 VITALS — DIASTOLIC BLOOD PRESSURE: 80 MMHG | SYSTOLIC BLOOD PRESSURE: 163 MMHG

## 2025-02-12 DIAGNOSIS — T81.30XA WOUND DEHISCENCE: Primary | ICD-10-CM

## 2025-02-12 DIAGNOSIS — Z89.421 STATUS POST AMPUTATION OF LESSER TOE OF RIGHT FOOT: ICD-10-CM

## 2025-02-12 NOTE — PROGRESS NOTES
ASSESSMENT:  S/p right foot second toe amputation on 12/12, followed by debridement of hematoma on 12/19/24 --> now with incisional dehiscence, depth to subcu --> improving     Hba1c: 5.2    MEDICAL DECISION MAKING:  -Discussed all findings with patient. Chart and imaging reviewed.   -Right foot evaluated: incision site improving. Excisional debridement done. No cellulitis or signs of infection.   -Discussed continued dressing plan. Downgrade to bandaid only. Can begin wearing regular shoes and be full WB with a walker and with PT. Instructions provided.   -Follow up in 4 weeks for re evaluation. Hopeful full healing at that time.       Nedra Workman DPM   [place] Department of Podiatry/Foot & Ankle Surgery      ____________________________________________________________________    HPI:       Gregory follows up with his son after hospitalization from surgical procedures on 12/12/2024 and 12/19/2024.   12/12/2024: Right second toe amputation for treatment of osteomyelitis and right first metatarsal bone biopsy.  Bone biopsy was negative.  12/18/24: Examined in clinic and found to have incisional dehiscence and hematoma prompting return to the OR.  12/19/2024:  Right foot excisional debridement down to and including tendon, site measuring 5 cm x 3 cm x 3 cm   Gregory resides at a nursing home and has assistance with monitoring and wound cares.     1/23: Follows up for right foot. Denies pain to the site. States some drainage. Per patient and his son, ongoing issues with falls at the assisted living. Had hematoma debridement with closure on 12/19/24.     2/12: Follows up for right foot. States less drainage from site. No issues. Has had ongoing falls at his assisted living.     Past Medical History:   Diagnosis Date    Acute hematogenous osteomyelitis of right foot (H)     Anemia     Atrial flutter (H)     Bladder cancer (H)     Cellulitis of leg     Cervicalgia     left arm radiculopathy starting July 2012     Choledochocele     with sludge; stable    Chronic atrial fibrillation (H)     Chronic kidney disease, stage III (moderate) (H)     Coronary artery disease     Dyslipidemia     Gastroesophageal reflux disease     Hearing loss     Hereditary and idiopathic peripheral neuropathy     History of basal cell carcinoma     HLD (hyperlipidemia)     Hyperparathyroidism     Hypertension     Impotence of organic origin     Macular degeneration of right eye     MCI (mild cognitive impairment)     Mild cognitive impairment     Multiple gallstones     Pre-syncope     Prostate cancer (H)     Sinus bradycardia     Tremor     Tremor    *  *  Past Surgical History:   Procedure Laterality Date    AMPUTATE TOE(S) Right 9/12/2024    Procedure: Right foot partial first ray resection, right foot partial third digit amputation, right foot excisional debridement down to and including tendon, site measuring 7 cm x 4 cm x 2 cm;  Surgeon: Nedra Workman DPM;  Location: [facility] OR    AMPUTATE TOE(S) Right 12/12/2024    Procedure: Right foot second digit amputation;  Surgeon: Nedra Workman DPM;  Location: [facility] OR    APPENDECTOMY      BIOPSY BONE FOOT Right 12/12/2024    Procedure: Right foot first metatarsal bone biopsy and culture;  Surgeon: Nedra Workman DPM;  Location: [facility] OR    Cryoablation of left and right renal masses      CYSTOSCOPY      Excision of basal cell carcinoma      IRRIGATION AND DEBRIDEMENT FOOT, COMBINED Right 9/14/2024    Procedure: Excisional debridement of necrotic tissue right foot;  Surgeon: Catarina Almendarez DPM, Podiatry/Foot and Ankle Surgery;  Location: [facility] OR    IRRIGATION AND DEBRIDEMENT FOOT, COMBINED Right 12/19/2024    Procedure: Right foot debridement;  Surgeon: Nedra Workman DPM;  Location:  OR    MOHS MICROGRAPHIC PROCEDURE      TONSILLECTOMY     *  *  Current Outpatient Medications   Medication Sig Dispense Refill    alendronate (FOSAMAX) 70 MG tablet Take 70 mg by mouth every 7 days.       melatonin 3 MG tablet Take 3 mg by mouth at bedtime.      Multiple Vitamins-Minerals (PRESERVISION AREDS 2) CHEW Take 1 tablet by mouth 2 times daily.      patiromer (VELTASSA) 8.4 g packet Take 8.4 g by mouth. on Mon Wed Fri at 1500      polyethylene glycol (MIRALAX) 17 GM/Dose powder Take 1 Capful by mouth daily.      vitamin D3 (CHOLECALCIFEROL) 50 mcg (2000 units) tablet Take 1 tablet by mouth daily.      warfarin ANTICOAGULANT (COUMADIN) 5 MG tablet Take by mouth 7.5 mg every Mon, Wed, Fri; 5 mg all other days or as directed. Next INR due 12/13/24. Call [facility] [place] [phone] with questions.      warfarin ANTICOAGULANT (COUMADIN) 7.5 MG tablet Take by mouth 7.5 mg every Mon, Wed, Fri; 5 mg all other days or as directed. Next INR due 12/13/24. Call [facility] [phone] with questions.           EXAM:    Vitals: BP (!) 163/80   BMI: There is no height or weight on file to calculate BMI.    General appearance: Patient is alert and fully cooperative with history & exam.  No sign of distress is noted during the visit.      Respiratory: Breathing is regular & unlabored while sitting.      HEENT: Hearing is intact to spoken word.  Speech is clear.  No gross evidence of visual impairment that would impact ambulation.      Derm:      Right foot incision site: distal site epithelialized, proximal site with some dehiscence. Sutures removed. Wound bed as below, fibro- granular. Depth to subcutaneous tissue. Measures 1 cm x .3 cm x .2 cm. No cellulitis or ischemia.     Vascular: Dorsalis pedis and posterior tibial pulses are intact & regular bilaterally.  CFT and skin temperature is normal to both lower extremities.      Neurologic: Lower extremity sensation is diminished, bilateral foot, to light touch.  No evidence of neurological-based weakness or contracture in the lower extremities.       Musculoskeletal: Patient is ambulatory without an assistive device or  brace.  S/p partial first ray, second toe amputation and partial third digit amputation.     Psychiatric: Affect is pleasant & appropriate.    PROCEDURE:   Verbal consent was obtained for debridement. A 15 blade was used to excise the nonivable tissue to the ulcer, down to and including subcutaneous tissue, on the right foot. 1 cm x .3 cm x .2 cm. No noted purulence afterwards. Ulcer measures . No probe to bone. Well tolerated. Site was cleansed with alcohol.

## 2025-02-12 NOTE — PATIENT INSTRUCTIONS
Thank you for choosing [facility] Podiatry / Foot & Ankle Surgery!    DR. PALOMINO'S LOCATIONS  [facility] APPOINTMENTS: [phone]   [address] TRIAGE NURSE: [phone]   [place] [address] RADIOLOGY: [phone]   FAX: [phone] BILLING QUESTIONS: [phone]    CONSUMER HERNANDEZ LINE:[phone]   [facility]    [facility] [address]    [place] [address]    PH: [phone]    FAX: [phone]      Follow up:   -Okay to begin wearing regular shoe on right foot. You can also be full WBAT to right foot with the walker.   -Continue every other day dressing changes to the amputation site. Cleanse site with wound cleanser or saline. Paint wound bed with betadine. Cover with large bandaid.     -Follow up in 4 weeks

## 2025-03-19 ENCOUNTER — OFFICE VISIT (OUTPATIENT)
Dept: PODIATRY | Facility: CLINIC | Age: 85
End: 2025-03-19
Payer: COMMERCIAL

## 2025-03-19 DIAGNOSIS — T81.30XA WOUND DEHISCENCE: Primary | ICD-10-CM

## 2025-03-19 DIAGNOSIS — Z89.421 STATUS POST AMPUTATION OF LESSER TOE OF RIGHT FOOT: ICD-10-CM

## 2025-03-19 DIAGNOSIS — Z09 SURGERY FOLLOW-UP EXAMINATION: ICD-10-CM

## 2025-03-19 NOTE — PROGRESS NOTES
ASSESSMENT:  S/p right foot second toe amputation on 12/12, followed by debridement of hematoma on 12/19/24 --> now with incisional dehiscence, depth to subcu --> improving --> now closed and healed     Hba1c: 5.2    MEDICAL DECISION MAKING:  -Discussed all findings with patient. Chart and imaging reviewed.   -Right foot evaluated: all sites now closed and healed. No areas that are draining.     -No areas that appear preulcerative or concerning on the right foot. Skin viable and closed.     -Discussed okay to continue all activity. No weightbearing or physical therapy restrictions.     -Discharged today. Call to follow up with any new areas of concern. Discussed causes for concern: drainage, redness, swelling, calluses, blisters etc.       Nedra Workman DPM   [place] Department of Podiatry/Foot & Ankle Surgery      ____________________________________________________________________    HPI:       Gregory follows up with his son after hospitalization from surgical procedures on 12/12/2024 and 12/19/2024.   12/12/2024: Right second toe amputation for treatment of osteomyelitis and right first metatarsal bone biopsy.  Bone biopsy was negative.  12/18/24: Examined in clinic and found to have incisional dehiscence and hematoma prompting return to the OR.  12/19/2024:  Right foot excisional debridement down to and including tendon, site measuring 5 cm x 3 cm x 3 cm   Gregory resides at a nursing home and has assistance with monitoring and wound cares.     1/23: Follows up for right foot. Denies pain to the site. States some drainage. Per patient and his son, ongoing issues with falls at the assisted living. Had hematoma debridement with closure on 12/19/24.     2/12: Follows up for right foot. States less drainage from site. No issues. Has had ongoing falls at his assisted living.     3/19: Follows up for the right foot. Denies pain. No drainage. Has been walking and in regular shoes. No further falls.     Past Medical  History:   Diagnosis Date    Acute hematogenous osteomyelitis of right foot (H)     Anemia     Atrial flutter (H)     Bladder cancer (H)     Cellulitis of leg     Cervicalgia     left arm radiculopathy starting July 2012    Choledochocele     with sludge; stable    Chronic atrial fibrillation (H)     Chronic kidney disease, stage III (moderate) (H)     Coronary artery disease     Dyslipidemia     Gastroesophageal reflux disease     Hearing loss     Hereditary and idiopathic peripheral neuropathy     History of basal cell carcinoma     HLD (hyperlipidemia)     Hyperparathyroidism     Hypertension     Impotence of organic origin     Macular degeneration of right eye     MCI (mild cognitive impairment)     Mild cognitive impairment     Multiple gallstones     Pre-syncope     Prostate cancer (H)     Sinus bradycardia     Tremor     Tremor    *  *  Past Surgical History:   Procedure Laterality Date    AMPUTATE TOE(S) Right 9/12/2024    Procedure: Right foot partial first ray resection, right foot partial third digit amputation, right foot excisional debridement down to and including tendon, site measuring 7 cm x 4 cm x 2 cm;  Surgeon: Nedra Workman DPM;  Location: [facility] OR    AMPUTATE TOE(S) Right 12/12/2024    Procedure: Right foot second digit amputation;  Surgeon: Nedra Workman DPM;  Location: [facility] OR    APPENDECTOMY      BIOPSY BONE FOOT Right 12/12/2024    Procedure: Right foot first metatarsal bone biopsy and culture;  Surgeon: Nedra Workman DPM;  Location: [facility] OR    Cryoablation of left and right renal masses      CYSTOSCOPY      Excision of basal cell carcinoma      IRRIGATION AND DEBRIDEMENT FOOT, COMBINED Right 9/14/2024    Procedure: Excisional debridement of necrotic tissue right foot;  Surgeon: Catarina Almendarez DPM, Podiatry/Foot and Ankle Surgery;  Location: [facility] OR    IRRIGATION AND DEBRIDEMENT FOOT, COMBINED Right 12/19/2024    Procedure: Right foot debridement;  Surgeon: Nedra Workman DPM;   Location:  OR    MOHS MICROGRAPHIC PROCEDURE      TONSILLECTOMY     *  *  Current Outpatient Medications   Medication Sig Dispense Refill    alendronate (FOSAMAX) 70 MG tablet Take 70 mg by mouth every 7 days.      melatonin 3 MG tablet Take 3 mg by mouth at bedtime.      Multiple Vitamins-Minerals (PRESERVISION AREDS 2) CHEW Take 1 tablet by mouth 2 times daily.      patiromer (VELTASSA) 8.4 g packet Take 8.4 g by mouth. on Mon Wed Fri at 1500      polyethylene glycol (MIRALAX) 17 GM/Dose powder Take 1 Capful by mouth daily.      vitamin D3 (CHOLECALCIFEROL) 50 mcg (2000 units) tablet Take 1 tablet by mouth daily.      warfarin ANTICOAGULANT (COUMADIN) 5 MG tablet Take by mouth 7.5 mg every Mon, Wed, Fri; 5 mg all other days or as directed. Next INR due 12/13/24. Call [facility] [place] [phone] with questions.      warfarin ANTICOAGULANT (COUMADIN) 7.5 MG tablet Take by mouth 7.5 mg every Mon, Wed, Fri; 5 mg all other days or as directed. Next INR due 12/13/24. Call [facility] [phone] with questions.           EXAM:    Vitals: There were no vitals taken for this visit.  BMI: There is no height or weight on file to calculate BMI.    General appearance: Patient is alert and fully cooperative with history & exam.  No sign of distress is noted during the visit.      Respiratory: Breathing is regular & unlabored while sitting.      HEENT: Hearing is intact to spoken word.  Speech is clear.  No gross evidence of visual impairment that would impact ambulation.      Derm:      Right foot incision site: distal site epithelialized, proximal site with some dehiscence. Sutures removed. Wound bed as below, fibro- granular. Depth to subcutaneous tissue. Measures 1 cm x .3 cm x .2 cm. --> now closed and healed. Well epithelialized.     Vascular: Dorsalis pedis and posterior tibial pulses are intact & regular bilaterally.  CFT and skin temperature is normal to both lower  extremities.      Neurologic: Lower extremity sensation is diminished, bilateral foot, to light touch.  No evidence of neurological-based weakness or contracture in the lower extremities.       Musculoskeletal: Patient is ambulatory without an assistive device or brace.  S/p partial first ray, second toe amputation and partial third digit amputation.     Psychiatric: Affect is pleasant & appropriate.

## 2025-03-19 NOTE — PATIENT INSTRUCTIONS
Thank you for choosing [facility] Podiatry / Foot & Ankle Surgery!    DR. PALOMINO'S LOCATIONS  [place] [facility] [place] APPOINTMENTS: [phone] [address] TRIAGE NURSE: [phone]   [place] [address] RADIOLOGY: [phone]   FAX: [phone] BILLING QUESTIONS: [phone]    CONSUMER HERNANDEZ LINE:[phone]   [facility] [place] [address] [address]    [place] [address]    PH: [phone]    FAX: [phone]      Follow up:   -No further follow up needed.     -Okay to continue full weightbearing on the right foot as able. No current restrictions per podiatry.

## 2025-03-19 NOTE — LETTER
3/19/2025      Gregory Zhong  [address] [facility] [address]      Dear Colleague,    Thank you for referring your patient, Gregory Zhong, to the [facility]. Please see a copy of my visit note below.    ASSESSMENT:  S/p right foot second toe amputation on 12/12, followed by debridement of hematoma on 12/19/24 --> now with incisional dehiscence, depth to subcu --> improving --> now closed and healed     Hba1c: 5.2    MEDICAL DECISION MAKING:  -Discussed all findings with patient. Chart and imaging reviewed.   -Right foot evaluated: all sites now closed and healed. No areas that are draining.     -No areas that appear preulcerative or concerning on the right foot. Skin viable and closed.     -Discussed okay to continue all activity. No weightbearing or physical therapy restrictions.     -Discharged today. Call to follow up with any new areas of concern. Discussed causes for concern: drainage, redness, swelling, calluses, blisters etc.       Nedra Workman DPM   [place] [facility] of Podiatry/Foot & Ankle Surgery      ____________________________________________________________________    HPI:       Gregory follows up with his son after hospitalization from surgical procedures on 12/12/2024 and 12/19/2024.   12/12/2024: Right second toe amputation for treatment of osteomyelitis and right first metatarsal bone biopsy.  Bone biopsy was negative.  12/18/24: Examined in clinic and found to have incisional dehiscence and hematoma prompting return to the OR.  12/19/2024:  Right foot excisional debridement down to and including tendon, site measuring 5 cm x 3 cm x 3 cm   Gregory resides at a nursing home and has assistance with monitoring and wound cares.     1/23: Follows up for right foot. Denies pain to the site. States some drainage. Per patient and his son, ongoing issues with falls at the assisted living. Had hematoma debridement with closure on 12/19/24.     2/12:  Follows up for right foot. States less drainage from site. No issues. Has had ongoing falls at his assisted living.     3/19: Follows up for the right foot. Denies pain. No drainage. Has been walking and in regular shoes. No further falls.     Past Medical History:   Diagnosis Date     Acute hematogenous osteomyelitis of right foot (H)      Anemia      Atrial flutter (H)      Bladder cancer (H)      Cellulitis of leg      Cervicalgia     left arm radiculopathy starting July 2012     Choledochocele     with sludge; stable     Chronic atrial fibrillation (H)      Chronic kidney disease, stage III (moderate) (H)      Coronary artery disease      Dyslipidemia      Gastroesophageal reflux disease      Hearing loss      Hereditary and idiopathic peripheral neuropathy      History of basal cell carcinoma      HLD (hyperlipidemia)      Hyperparathyroidism      Hypertension      Impotence of organic origin      Macular degeneration of right eye      MCI (mild cognitive impairment)      Mild cognitive impairment      Multiple gallstones      Pre-syncope      Prostate cancer (H)      Sinus bradycardia      Tremor      Tremor    *  *  Past Surgical History:   Procedure Laterality Date     AMPUTATE TOE(S) Right 9/12/2024    Procedure: Right foot partial first ray resection, right foot partial third digit amputation, right foot excisional debridement down to and including tendon, site measuring 7 cm x 4 cm x 2 cm;  Surgeon: Nedra Workman DPM;  Location: [facility] OR     AMPUTATE TOE(S) Right 12/12/2024    Procedure: Right foot second digit amputation;  Surgeon: Nedra Workman DPM;  Location: [facility] OR     APPENDECTOMY       BIOPSY BONE FOOT Right 12/12/2024    Procedure: Right foot first metatarsal bone biopsy and culture;  Surgeon: Nedra Workman DPM;  Location: [facility] OR     Cryoablation of left and right renal masses       CYSTOSCOPY       Excision of basal cell carcinoma       IRRIGATION AND DEBRIDEMENT FOOT, COMBINED Right  9/14/2024    Procedure: Excisional debridement of necrotic tissue right foot;  Surgeon: Catarina Almendarez DPM, Podiatry/Foot and Ankle Surgery;  Location: [facility] OR     IRRIGATION AND DEBRIDEMENT FOOT, COMBINED Right 12/19/2024    Procedure: Right foot debridement;  Surgeon: Nedra Workman DPM;  Location:  OR     MOHS MICROGRAPHIC PROCEDURE       TONSILLECTOMY     *  *  Current Outpatient Medications   Medication Sig Dispense Refill     alendronate (FOSAMAX) 70 MG tablet Take 70 mg by mouth every 7 days.       melatonin 3 MG tablet Take 3 mg by mouth at bedtime.       Multiple Vitamins-Minerals (PRESERVISION AREDS 2) CHEW Take 1 tablet by mouth 2 times daily.       patiromer (VELTASSA) 8.4 g packet Take 8.4 g by mouth. on Mon Wed Fri at 1500       polyethylene glycol (MIRALAX) 17 GM/Dose powder Take 1 Capful by mouth daily.       vitamin D3 (CHOLECALCIFEROL) 50 mcg (2000 units) tablet Take 1 tablet by mouth daily.       warfarin ANTICOAGULANT (COUMADIN) 5 MG tablet Take by mouth 7.5 mg every Mon, Wed, Fri; 5 mg all other days or as directed. Next INR due 12/13/24. Call [facility] [phone] with questions.       warfarin ANTICOAGULANT (COUMADIN) 7.5 MG tablet Take by mouth 7.5 mg every Mon, Wed, Fri; 5 mg all other days or as directed. Next INR due 12/13/24. Call [facility] [phone] with questions.           EXAM:    Vitals: There were no vitals taken for this visit.  BMI: There is no height or weight on file to calculate BMI.    General appearance: Patient is alert and fully cooperative with history & exam.  No sign of distress is noted during the visit.      Respiratory: Breathing is regular & unlabored while sitting.      HEENT: Hearing is intact to spoken word.  Speech is clear.  No gross evidence of visual impairment that would impact ambulation.      Derm:      Right foot incision site: distal site epithelialized, proximal site with some dehiscence.  Sutures removed. Wound bed as below, fibro- granular. Depth to subcutaneous tissue. Measures 1 cm x .3 cm x .2 cm. --> now closed and healed. Well epithelialized.     Vascular: Dorsalis pedis and posterior tibial pulses are intact & regular bilaterally.  CFT and skin temperature is normal to both lower extremities.      Neurologic: Lower extremity sensation is diminished, bilateral foot, to light touch.  No evidence of neurological-based weakness or contracture in the lower extremities.       Musculoskeletal: Patient is ambulatory without an assistive device or brace.  S/p partial first ray, second toe amputation and partial third digit amputation.     Psychiatric: Affect is pleasant & appropriate.                   Again, thank you for allowing me to participate in the care of your patient.        Sincerely,        Nedra Workman DPM    Electronically signed

## 2025-05-23 ENCOUNTER — HOSPITAL ENCOUNTER (EMERGENCY)
Facility: CLINIC | Age: 85
Discharge: HOME | End: 2025-05-23
Attending: EMERGENCY MEDICINE | Admitting: EMERGENCY MEDICINE
Payer: COMMERCIAL

## 2025-05-23 ENCOUNTER — APPOINTMENT (OUTPATIENT)
Dept: CT IMAGING | Facility: CLINIC | Age: 85
End: 2025-05-23
Attending: EMERGENCY MEDICINE
Payer: COMMERCIAL

## 2025-05-23 VITALS
HEART RATE: 58 BPM | OXYGEN SATURATION: 97 % | RESPIRATION RATE: 16 BRPM | DIASTOLIC BLOOD PRESSURE: 72 MMHG | SYSTOLIC BLOOD PRESSURE: 168 MMHG | TEMPERATURE: 98.3 F

## 2025-05-23 DIAGNOSIS — S00.33XA CONTUSION OF NOSE, INITIAL ENCOUNTER: ICD-10-CM

## 2025-05-23 DIAGNOSIS — S00.81XA FACIAL ABRASION, INITIAL ENCOUNTER: ICD-10-CM

## 2025-05-23 DIAGNOSIS — R04.0 EPISTAXIS: ICD-10-CM
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
## 2025-05-23 LAB
ANION GAP SERPL CALCULATED.3IONS-SCNC: 9 MMOL/L (ref 7–15)
ATRIAL RATE - MUSE: 52 BPM
BASOPHILS # BLD AUTO: 0.1 10E3/UL (ref 0–0.2)
BASOPHILS NFR BLD AUTO: 1 %
BUN SERPL-MCNC: 29 MG/DL (ref 8–23)
CALCIUM SERPL-MCNC: 10.4 MG/DL (ref 8.8–10.4)
CHLORIDE SERPL-SCNC: 110 MMOL/L (ref 98–107)
CREAT SERPL-MCNC: 1.73 MG/DL (ref 0.67–1.17)
DIASTOLIC BLOOD PRESSURE - MUSE: NORMAL MMHG
EGFRCR SERPLBLD CKD-EPI 2021: 39 ML/MIN/1.73M2
EOSINOPHIL # BLD AUTO: 0.3 10E3/UL (ref 0–0.7)
EOSINOPHIL NFR BLD AUTO: 2 %
ERYTHROCYTE [DISTWIDTH] IN BLOOD BY AUTOMATED COUNT: 15 % (ref 10–15)
GLUCOSE SERPL-MCNC: 94 MG/DL (ref 70–99)
HCO3 SERPL-SCNC: 25 MMOL/L (ref 22–29)
HCT VFR BLD AUTO: 39.3 % (ref 40–53)
HGB BLD-MCNC: 12.7 G/DL (ref 13.3–17.7)
IMM GRANULOCYTES # BLD: 0.1 10E3/UL
IMM GRANULOCYTES NFR BLD: 1 %
INR PPP: 2.16 (ref 0.85–1.15)
INTERPRETATION ECG - MUSE: NORMAL
LYMPHOCYTES # BLD AUTO: 1.8 10E3/UL (ref 0.8–5.3)
LYMPHOCYTES NFR BLD AUTO: 15 %
MCH RBC QN AUTO: 28.7 PG (ref 26.5–33)
MCHC RBC AUTO-ENTMCNC: 32.3 G/DL (ref 31.5–36.5)
MCV RBC AUTO: 89 FL (ref 78–100)
MONOCYTES # BLD AUTO: 0.9 10E3/UL (ref 0–1.3)
MONOCYTES NFR BLD AUTO: 8 %
NEUTROPHILS # BLD AUTO: 8.6 10E3/UL (ref 1.6–8.3)
NEUTROPHILS NFR BLD AUTO: 74 %
NRBC # BLD AUTO: 0 10E3/UL
NRBC BLD AUTO-RTO: 0 /100
P AXIS - MUSE: 53 DEGREES
PLATELET # BLD AUTO: 186 10E3/UL (ref 150–450)
POTASSIUM SERPL-SCNC: 5 MMOL/L (ref 3.4–5.3)
PR INTERVAL - MUSE: 232 MS
PROTHROMBIN TIME: 23.3 SECONDS (ref 11.8–14.8)
QRS DURATION - MUSE: 102 MS
QT - MUSE: 422 MS
QTC - MUSE: 392 MS
R AXIS - MUSE: -11 DEGREES
RBC # BLD AUTO: 4.42 10E6/UL (ref 4.4–5.9)
SODIUM SERPL-SCNC: 144 MMOL/L (ref 135–145)
SYSTOLIC BLOOD PRESSURE - MUSE: NORMAL MMHG
T AXIS - MUSE: 29 DEGREES
VENTRICULAR RATE- MUSE: 52 BPM
WBC # BLD AUTO: 11.6 10E3/UL (ref 4–11)

## 2025-05-23 PROCEDURE — 36415 COLL VENOUS BLD VENIPUNCTURE: CPT | Performed by: EMERGENCY MEDICINE

## 2025-05-23 PROCEDURE — 85610 PROTHROMBIN TIME: CPT | Performed by: EMERGENCY MEDICINE

## 2025-05-23 PROCEDURE — 85025 COMPLETE CBC W/AUTO DIFF WBC: CPT | Performed by: EMERGENCY MEDICINE

## 2025-05-23 PROCEDURE — 82374 ASSAY BLOOD CARBON DIOXIDE: CPT | Performed by: EMERGENCY MEDICINE

## 2025-05-23 PROCEDURE — 99285 EMERGENCY DEPT VISIT HI MDM: CPT | Mod: 25

## 2025-05-23 PROCEDURE — 70450 CT HEAD/BRAIN W/O DYE: CPT

## 2025-05-23 PROCEDURE — 93005 ELECTROCARDIOGRAM TRACING: CPT

## 2025-05-23 ASSESSMENT — ACTIVITIES OF DAILY LIVING (ADL)
ADLS_ACUITY_SCORE: 57

## 2025-05-23 ASSESSMENT — COLUMBIA-SUICIDE SEVERITY RATING SCALE - C-SSRS
6. HAVE YOU EVER DONE ANYTHING, STARTED TO DO ANYTHING, OR PREPARED TO DO ANYTHING TO END YOUR LIFE?: NO
2. HAVE YOU ACTUALLY HAD ANY THOUGHTS OF KILLING YOURSELF IN THE PAST MONTH?: NO
1. IN THE PAST MONTH, HAVE YOU WISHED YOU WERE DEAD OR WISHED YOU COULD GO TO SLEEP AND NOT WAKE UP?: NO

## 2025-05-23 NOTE — ED PROVIDER NOTES
Emergency Department Note      History of Present Illness     Chief Complaint   Fall      HPI   Gregory Zhong is a 84 year old male on Coumadin with history of dementia, bladder and prostate cancer, hypertension, dyslipidemia and CKD stage 3b who presents to the emergency department via EMS for evaluation following a fall. The patient was attempting to pull his pants up while standing slightly from his wheelchair and falling forward landing flat on his face. Denies loss of consciousness. He can ambulate around rooms with a walker. Denies neck pain, shoulder pain, wrist pain and hip pain. Last Tdap from 2022.      Independent Historian   None    Review of External Notes   none    Past Medical History     Medical History and Problem List   Adenomatous polyp of colon  Agitation due to dementia  Anxiety  Brain mass  Cervicalgia   Choledochocele  Peripapillary neovascularization of left eye  Cataracts  Dyslipidemia  Hypertension  Hearing loss  Hyperparathyroidism  CKD stage 3b  Hypertensive renal disease  Alzheimer type dementia  Multiple gallstones  Osteopenia  Bladder cancer  Malignant neoplasm of prostate  Atrial flutter with controlled response  Severe sepsis  Ulcer of right foot with fat layer exposed    Medications  Norvasc  Aricept  Lexapro  Toprol XL  Remeron  Seroquel  Sodium bicarbonate   Veltassa  Fosamax  Miralax  Coumadin    Surgical History   Amputate toes (R)  Appendectomy  Biopsy bone foot (R)  Cryoablation of left and right renal masses  Cystoscopy  Excision of BCC  Irrigation and debridement foot (R) x2  Mohs micrographic procedure  Tonsillectomy     Physical Exam     Patient Vitals for the past 24 hrs:   BP Temp Temp src Pulse Resp SpO2   05/23/25 0731 -- -- -- -- -- 98 %   05/23/25 0729 (!) 170/97 -- -- 50 -- --   05/23/25 0641 (!) 159/78 98.3  F (36.8  C) Temporal 54 16 99 %     Physical Exam  Nursing note and vitals reviewed.    Constitutional:  Appears comfortable.    HENT:                Dried  blood around nose and left nare. No septal hematoma. No bony tenderness over his nose. Some tenderness over distal cartilaginous portion. Abrasions below left nare on lip and chin.   No active bleeding.  Eyes:    Conjunctivae are normal without injection.  Pupils are equal.  Cardiovascular:  Normal rate, irregular rhythm with normal S1 and S2.      Normal heart sounds and peripheral pulses 2+ and equal.    Pulmonary:  Effort normal and breath sounds clear to auscultation bilaterally. No respiratory distress.  No chest wall tenderness.  GI:    Soft. No distension and no mass. No tenderness.   Musculoskeletal:  No midline cervical neck tenderness.  No pain in his arms or legs with movement.  Neurological:   Alert and oriented. No focal weakness.  GCS of 15.  Skin:    Skin is warm and dry.  Abrasions on the face as noted above.  Psychiatric:   Behavior is normal. Appropriate mood and affect.     Judgment and thought content normal.        Diagnostics     Lab Results   Labs Ordered and Resulted from Time of ED Arrival to Time of ED Departure   INR - Abnormal       Result Value    INR 2.16 (*)     PT 23.3 (*)    BASIC METABOLIC PANEL - Abnormal    Sodium 144      Potassium 5.0      Chloride 110 (*)     Carbon Dioxide (CO2) 25      Anion Gap 9      Urea Nitrogen 29.0 (*)     Creatinine 1.73 (*)     GFR Estimate 39 (*)     Calcium 10.4      Glucose 94     CBC WITH PLATELETS AND DIFFERENTIAL - Abnormal    WBC Count 11.6 (*)     RBC Count 4.42      Hemoglobin 12.7 (*)     Hematocrit 39.3 (*)     MCV 89      MCH 28.7      MCHC 32.3      RDW 15.0      Platelet Count 186      % Neutrophils 74      % Lymphocytes 15      % Monocytes 8      % Eosinophils 2      % Basophils 1      % Immature Granulocytes 1      NRBCs per 100 WBC 0      Absolute Neutrophils 8.6 (*)     Absolute Lymphocytes 1.8      Absolute Monocytes 0.9      Absolute Eosinophils 0.3      Absolute Basophils 0.1      Absolute Immature Granulocytes 0.1      Absolute  NRBCs 0.0         Imaging   Head CT w/o contrast   Final Result   IMPRESSION:   1.  No acute intracranial abnormality.   2.  Generalized parenchymal atrophy and presumed sequela of chronic microvascular ischemic disease.          EKG   ECG taken at 0658, ECG read at 0700  Sinus bradycardia with sinus arrhythmia with 1st degree AV block  Otherwise normal ECG   Rate 52 bpm. CT interval 232 ms. QRS duration 102 ms. QT/QTc 422/392 ms. P-R-T axes 53 -11 29.    Independent Interpretation   None    ED Course      Medications Administered   Medications - No data to display    Procedures   Procedures     Discussion of Management   None    ED Course   ED Course as of 05/23/25 0943   Fri May 23, 2025   0708 I obtained history and examined the patient as noted above     0908 I rechecked the patient and explained findings.         Additional Documentation  None    Medical Decision Making / Diagnosis     CMS Diagnoses: None    MIPS   None               MDM   Gregory Zhong is a 84 year old male who tried to pull his pants up after standing up out of his wheelchair and leaned forward and fell right on his face.  No evidence of bony tenderness to the nose, is not bleeding actively no septal hematoma.  No neck pain.  Upper and lower extremities were normal.  He was cleaned up and bacitracin was placed on the abrasions.  He got up and ambulated with a walker and did well.  I did get a CT of his head because of the fact he is on Coumadin and it was negative for bleed.  INR is therapeutic and the rest of his labs are fine.  Patient will be going back to the care center.      No blowing your nose for 2 to 3 days, if it bleeds, the nasal clamp can be placed for 20 to 30 minutes to help it stop.  Ice pack to your nose as needed.  Antibiotic ointment or Vaseline on the abrasions for 2 to 3 days and then once they scab over no more ointment is needed.  Contact your doctor if any further problems.  Do not try to get up and walk without your  walker, never recommend you bend forward or try to bend down to pick something up.  Tylenol as needed if you feel stiff and sore.    Disposition   The patient was discharged.     Diagnosis     ICD-10-CM    1. Ground-level fall  W18.30XA       2. Contusion of nose, initial encounter  S00.33XA       3. Epistaxis  R04.0       4. Facial abrasion, initial encounter  S00.81XA            Discharge Medications   New Prescriptions    No medications on file         Scribe Disclosure:  I, Tru Torre, am serving as a scribe at 7:09 AM on 5/23/2025 to document services personally performed by Ale Cortes MD based on my observations and the provider's statements to me.        Ale Cortes MD  05/23/25 4026

## 2025-05-23 NOTE — ED NOTES
Bed: ED11  Expected date: 5/23/25  Expected time: 6:30 AM  Means of arrival: Ambulance  Comments:  Dharmesh 424  84M fall, nose bleed

## 2025-05-23 NOTE — ED NOTES
Patient was road tested with walker and able to walk with walker, no issues or complaints. Noseclip per MD given to the patient

## 2025-05-23 NOTE — ED TRIAGE NOTES
Patient arrives via EMS from his assisted living after a fall out of his wheelchair this morning. Has been using a wheelchair to get around and has almost finished his PT regimen. This morning was trying to pull up his pants and fell forward. His face hit the carpet, mostly nose. Was able to get himself up to the bed and call for staff. No LOC. Is on coumadin. Bleeding controlled on EMS arrival. Arrives in C collar. No neck pain or other injuries besides bloody nose.      Triage Assessment (Adult)       Row Name 05/23/25 0642          Triage Assessment    Airway WDL WDL        Respiratory WDL    Respiratory WDL WDL        Skin Circulation/Temperature WDL    Skin Circulation/Temperature WDL WDL        Cardiac WDL    Cardiac WDL WDL        Peripheral/Neurovascular WDL    Peripheral Neurovascular WDL WDL        Cognitive/Neuro/Behavioral WDL    Cognitive/Neuro/Behavioral WDL WDL

## 2025-05-23 NOTE — DISCHARGE INSTRUCTIONS
No blowing your nose for 2 to 3 days, if it bleeds, the nasal clamp can be placed for 20 to 30 minutes to help it stop.  Ice pack to your nose as needed.  Antibiotic ointment or Vaseline on the abrasions for 2 to 3 days and then once they scab over no more ointment is needed.  Contact your doctor if any further problems.  Do not try to get up and walk without your walker, never recommend you bend forward or try to bend down to pick something up.  Tylenol as needed if you feel stiff and sore.

## 2025-05-23 NOTE — ED NOTES
Emergency Department Technician Wound Irrigation Note:    5/23/2025    7:34 AM      Wound location:  nose and chin abrasions    Irrigation Fluid: Normal Saline and vik marin    Estimated Irrigation Volume (60 mL fluid per cm): 300 mls    Suzan Vaca

## 2025-07-23 ENCOUNTER — TELEPHONE (OUTPATIENT)
Dept: OTOLARYNGOLOGY | Facility: CLINIC | Age: 85
End: 2025-07-23
Payer: COMMERCIAL

## 2025-07-23 NOTE — TELEPHONE ENCOUNTER
Left Voicemail (1st Attempt) for the patient to call back and schedule the following:    Appointment Type: Return Craniofacial  Provider: Dr. Saurav Banegas    Appt date: next open  Specialty phone number: Direct   Additional appointment(s) needed: MRI and audio prior  Additional Notes: patient missed visit on 7/15

## (undated) DEVICE — GLOVE BIOGEL PI MICRO INDICATOR UNDERGLOVE SZ 7.0 48970

## (undated) DEVICE — GLOVE BIOGEL PI SZ 6.5 40865

## (undated) DEVICE — PREP SKIN SCRUB TRAY 4461A

## (undated) DEVICE — LINEN FULL SHEET 5511

## (undated) DEVICE — BNDG ELASTIC 4"X5YDS UNSTERILE 6611-40

## (undated) DEVICE — NDL 25GA 1.5" 305127

## (undated) DEVICE — PACK LOWER EXTREMITY RIDGES

## (undated) DEVICE — BLADE KNIFE SURG 15 371115

## (undated) DEVICE — NDL BX BONE MARROW 11GA 4"

## (undated) DEVICE — DRSG KERLIX 4 1/2"X4YDS ROLL 6730

## (undated) DEVICE — SU PROLENE 3-0 PS-2 18" 8687H

## (undated) DEVICE — GLOVE BIOGEL PI MICRO SZ 6.5 48565

## (undated) DEVICE — BLADE SAW SAGITTAL 25.5X9.5X.4MM FINE LINVATEC 5023-138

## (undated) DEVICE — PREP POVIDONE-IODINE 7.5% SCRUB 4OZ BOTTLE MDS093945

## (undated) DEVICE — LINEN HALF SHEET 5512

## (undated) DEVICE — SOL WATER IRRIG 1000ML BOTTLE 2F7114

## (undated) DEVICE — PREP POVIDONE IODINE SOLUTION 10% 4OZ BOTTLE 29906-004

## (undated) DEVICE — SPONGE RAY-TEC 4X8" 7318

## (undated) DEVICE — TRAY PREP DRY SKIN SCRUB 067

## (undated) DEVICE — BAG DECANTER STERILE WHITE DYNJDEC09

## (undated) DEVICE — SUCTION CANISTER MEDIVAC LINER 3000ML W/LID 65651-530

## (undated) DEVICE — TUBE CULTURE AEROBIC/ANAEROBIC W/O SWABS A.C.T.I.1. 12401

## (undated) DEVICE — SOL NACL 0.9% IRRIG 1000ML BOTTLE 2F7124

## (undated) DEVICE — SU ETHILON 2-0 PS 18" 585H

## (undated) DEVICE — BAG CLEAR TRASH 1.3M 39X33" P4040C

## (undated) DEVICE — SU ETHILON 3-0 PS-2 18" 1669H

## (undated) DEVICE — NDL 18GA 1.5" 305196

## (undated) DEVICE — ESU GROUND PAD ADULT W/CORD E7507

## (undated) DEVICE — SYR 10ML FINGER CONTROL W/O NDL 309695

## (undated) DEVICE — DRAPE STERI TOWEL LG 1010

## (undated) DEVICE — ESU PENCIL W/HOLSTER E2350H

## (undated) DEVICE — VIAL DECANTER STERILE WHITE DYNJDEC06

## (undated) DEVICE — SU PROLENE 2-0 CT-2 30" 8411H

## (undated) DEVICE — DRSG ABDOMINAL 07 1/2X8" 7197D

## (undated) DEVICE — LINEN TOWEL PACK X5 5464

## (undated) DEVICE — NDL BLUNT 18GA 1" W/O FILTER 305181

## (undated) DEVICE — LINEN ORTHO ACL PACK 5447

## (undated) DEVICE — SPONGE SURGIFOAM 12 1972

## (undated) DEVICE — ESU GROUND PAD UNIVERSAL W/O CORD

## (undated) DEVICE — GLOVE BIOGEL PI MICRO INDICATOR UNDERGLOVE SZ 6.5 48965

## (undated) DEVICE — APPLICATORS COTTON TIP 6"X2 STERILE LF C15053-006

## (undated) DEVICE — DRSG GAUZE 4X4" TRAY

## (undated) DEVICE — PACK EXTREMITY SOP15EXFSD

## (undated) DEVICE — ESU ELEC BLADE 2.75" COATED/INSULATED E1455

## (undated) DEVICE — TOURNIQUET SGL BLADDER 18"X4" RED 5921-218-135

## (undated) DEVICE — SUCTION MANIFOLD NEPTUNE 2 SYS 4 PORT 0702-020-000

## (undated) DEVICE — SU PROLENE 2-0 FS 18" 8685H

## (undated) RX ORDER — FENTANYL CITRATE 50 UG/ML
INJECTION, SOLUTION INTRAMUSCULAR; INTRAVENOUS
Status: DISPENSED
Start: 2024-12-12

## (undated) RX ORDER — BUPIVACAINE HYDROCHLORIDE 5 MG/ML
INJECTION, SOLUTION EPIDURAL; INTRACAUDAL
Status: DISPENSED
Start: 2024-12-12

## (undated) RX ORDER — LIDOCAINE HYDROCHLORIDE 10 MG/ML
INJECTION, SOLUTION EPIDURAL; INFILTRATION; INTRACAUDAL; PERINEURAL
Status: DISPENSED
Start: 2024-09-14

## (undated) RX ORDER — EPHEDRINE SULFATE 50 MG/ML
INJECTION, SOLUTION INTRAMUSCULAR; INTRAVENOUS; SUBCUTANEOUS
Status: DISPENSED
Start: 2024-09-12

## (undated) RX ORDER — CEFAZOLIN SODIUM/WATER 2 G/20 ML
SYRINGE (ML) INTRAVENOUS
Status: DISPENSED
Start: 2024-12-12

## (undated) RX ORDER — BUPIVACAINE HYDROCHLORIDE 5 MG/ML
INJECTION, SOLUTION EPIDURAL; INTRACAUDAL
Status: DISPENSED
Start: 2024-09-12

## (undated) RX ORDER — PROPOFOL 10 MG/ML
INJECTION, EMULSION INTRAVENOUS
Status: DISPENSED
Start: 2024-09-14

## (undated) RX ORDER — BUPIVACAINE HYDROCHLORIDE 5 MG/ML
INJECTION, SOLUTION EPIDURAL; INTRACAUDAL
Status: DISPENSED
Start: 2024-09-14

## (undated) RX ORDER — ONDANSETRON 2 MG/ML
INJECTION INTRAMUSCULAR; INTRAVENOUS
Status: DISPENSED
Start: 2024-12-19

## (undated) RX ORDER — PROPOFOL 10 MG/ML
INJECTION, EMULSION INTRAVENOUS
Status: DISPENSED
Start: 2024-09-12